# Patient Record
Sex: MALE | URBAN - METROPOLITAN AREA
[De-identification: names, ages, dates, MRNs, and addresses within clinical notes are randomized per-mention and may not be internally consistent; named-entity substitution may affect disease eponyms.]

---

## 2018-12-01 ENCOUNTER — LAB REQUISITION (OUTPATIENT)
Dept: LAB | Facility: HOSPITAL | Age: 48
End: 2018-12-01

## 2018-12-01 DIAGNOSIS — E87.8 OTHER DISORDERS OF ELECTROLYTE AND FLUID BALANCE, NOT ELSEWHERE CLASSIFIED: ICD-10-CM

## 2018-12-01 LAB — POTASSIUM SERPL-SCNC: 3.4 MMOL/L (ref 3.5–5.3)

## 2018-12-01 PROCEDURE — 84132 ASSAY OF SERUM POTASSIUM: CPT | Performed by: PHYSICIAN ASSISTANT

## 2023-05-04 ENCOUNTER — HOSPITAL ENCOUNTER (OUTPATIENT)
Dept: ULTRASOUND IMAGING | Facility: HOSPITAL | Age: 53
End: 2023-05-04

## 2023-05-04 DIAGNOSIS — I50.33 ACUTE ON CHRONIC DIASTOLIC HEART FAILURE (HCC): ICD-10-CM

## 2023-05-04 DIAGNOSIS — D53.9 NUTRITIONAL ANEMIA: ICD-10-CM

## 2023-09-06 ENCOUNTER — LAB REQUISITION (OUTPATIENT)
Dept: LAB | Facility: HOSPITAL | Age: 53
End: 2023-09-06
Payer: COMMERCIAL

## 2023-09-06 DIAGNOSIS — Z86.010 PERSONAL HISTORY OF COLONIC POLYPS: ICD-10-CM

## 2023-09-06 DIAGNOSIS — D69.6 THROMBOCYTOPENIA, UNSPECIFIED (HCC): ICD-10-CM

## 2023-09-06 DIAGNOSIS — N18.4 CHRONIC KIDNEY DISEASE, STAGE 4 (SEVERE) (HCC): ICD-10-CM

## 2023-09-06 DIAGNOSIS — I50.33 ACUTE ON CHRONIC DIASTOLIC (CONGESTIVE) HEART FAILURE (HCC): ICD-10-CM

## 2023-09-06 DIAGNOSIS — D63.1 ANEMIA IN CHRONIC KIDNEY DISEASE (CODE): ICD-10-CM

## 2023-09-06 DIAGNOSIS — D80.2 SELECTIVE DEFICIENCY OF IMMUNOGLOBULIN A (IGA) (HCC): ICD-10-CM

## 2023-09-06 DIAGNOSIS — D75.89 OTHER SPECIFIED DISEASES OF BLOOD AND BLOOD-FORMING ORGANS: ICD-10-CM

## 2023-09-06 DIAGNOSIS — D51.9 VITAMIN B12 DEFICIENCY ANEMIA, UNSPECIFIED: ICD-10-CM

## 2023-09-06 DIAGNOSIS — D53.9 NUTRITIONAL ANEMIA, UNSPECIFIED: ICD-10-CM

## 2023-09-06 DIAGNOSIS — K76.0 FATTY (CHANGE OF) LIVER, NOT ELSEWHERE CLASSIFIED: ICD-10-CM

## 2023-09-06 LAB
BASOPHILS # BLD AUTO: 0.04 THOUSANDS/ÂΜL (ref 0–0.1)
BASOPHILS NFR BLD AUTO: 1 % (ref 0–1)
EOSINOPHIL # BLD AUTO: 0.1 THOUSAND/ÂΜL (ref 0–0.61)
EOSINOPHIL NFR BLD AUTO: 2 % (ref 0–6)
ERYTHROCYTE [DISTWIDTH] IN BLOOD BY AUTOMATED COUNT: 15.9 % (ref 11.6–15.1)
HCT VFR BLD AUTO: 37.4 % (ref 36.5–49.3)
HGB BLD-MCNC: 12.5 G/DL (ref 12–17)
IMM GRANULOCYTES # BLD AUTO: 0.01 THOUSAND/UL (ref 0–0.2)
IMM GRANULOCYTES NFR BLD AUTO: 0 % (ref 0–2)
LYMPHOCYTES # BLD AUTO: 0.86 THOUSANDS/ÂΜL (ref 0.6–4.47)
LYMPHOCYTES NFR BLD AUTO: 15 % (ref 14–44)
MCH RBC QN AUTO: 31.4 PG (ref 26.8–34.3)
MCHC RBC AUTO-ENTMCNC: 33.4 G/DL (ref 31.4–37.4)
MCV RBC AUTO: 94 FL (ref 82–98)
MONOCYTES # BLD AUTO: 0.54 THOUSAND/ÂΜL (ref 0.17–1.22)
MONOCYTES NFR BLD AUTO: 9 % (ref 4–12)
NEUTROPHILS # BLD AUTO: 4.24 THOUSANDS/ÂΜL (ref 1.85–7.62)
NEUTS SEG NFR BLD AUTO: 73 % (ref 43–75)
NRBC BLD AUTO-RTO: 0 /100 WBCS
PLATELET # BLD AUTO: 151 THOUSANDS/UL (ref 149–390)
PMV BLD AUTO: 11.4 FL (ref 8.9–12.7)
RBC # BLD AUTO: 3.98 MILLION/UL (ref 3.88–5.62)
WBC # BLD AUTO: 5.79 THOUSAND/UL (ref 4.31–10.16)

## 2023-09-06 PROCEDURE — 85025 COMPLETE CBC W/AUTO DIFF WBC: CPT | Performed by: INTERNAL MEDICINE

## 2024-04-10 ENCOUNTER — RA CDI HCC (OUTPATIENT)
Dept: OTHER | Facility: HOSPITAL | Age: 54
End: 2024-04-10

## 2024-04-10 NOTE — PROGRESS NOTES
HCC coding opportunities       Chart reviewed, no opportunity found: CHART REVIEWED, NO OPPORTUNITY FOUND   This is a reminder to address (resolve/update/assess) ALL HCC (risk adjustment) codes as found on active problem list for 2024 as patient scores reset to zero CLARI.  Also, just a reminder to please review and assess all other chronic conditions for 2024     Patients Insurance        Commercial Insurance: [employer]

## 2024-04-17 ENCOUNTER — OFFICE VISIT (OUTPATIENT)
Dept: FAMILY MEDICINE CLINIC | Facility: HOSPITAL | Age: 54
End: 2024-04-17
Payer: COMMERCIAL

## 2024-04-17 VITALS
BODY MASS INDEX: 24.59 KG/M2 | HEART RATE: 76 BPM | HEIGHT: 69 IN | WEIGHT: 166 LBS | OXYGEN SATURATION: 99 % | DIASTOLIC BLOOD PRESSURE: 90 MMHG | SYSTOLIC BLOOD PRESSURE: 150 MMHG

## 2024-04-17 DIAGNOSIS — K80.80 BILIARY CALCULUS OF OTHER SITE WITHOUT OBSTRUCTION: Primary | ICD-10-CM

## 2024-04-17 DIAGNOSIS — I50.33 ACUTE ON CHRONIC DIASTOLIC HEART FAILURE (HCC): ICD-10-CM

## 2024-04-17 DIAGNOSIS — K85.00 IDIOPATHIC ACUTE PANCREATITIS, UNSPECIFIED COMPLICATION STATUS: ICD-10-CM

## 2024-04-17 DIAGNOSIS — Z87.898 HISTORY OF SEIZURE: ICD-10-CM

## 2024-04-17 DIAGNOSIS — Z12.5 SCREENING FOR PROSTATE CANCER: ICD-10-CM

## 2024-04-17 DIAGNOSIS — N18.9 HISTORY OF ANEMIA DUE TO CHRONIC KIDNEY DISEASE: ICD-10-CM

## 2024-04-17 DIAGNOSIS — Z86.73 HISTORY OF CVA (CEREBROVASCULAR ACCIDENT): ICD-10-CM

## 2024-04-17 DIAGNOSIS — F41.8 SITUATIONAL ANXIETY: ICD-10-CM

## 2024-04-17 DIAGNOSIS — I10 PRIMARY HYPERTENSION: ICD-10-CM

## 2024-04-17 DIAGNOSIS — E78.2 MIXED HYPERLIPIDEMIA: ICD-10-CM

## 2024-04-17 DIAGNOSIS — Z86.2 HISTORY OF ANEMIA DUE TO CHRONIC KIDNEY DISEASE: ICD-10-CM

## 2024-04-17 DIAGNOSIS — N18.4 CKD (CHRONIC KIDNEY DISEASE) STAGE 4, GFR 15-29 ML/MIN (HCC): Chronic | ICD-10-CM

## 2024-04-17 PROBLEM — Z78.9 ALCOHOL USE: Status: ACTIVE | Noted: 2023-03-21

## 2024-04-17 PROBLEM — E87.29 HIGH ANION GAP METABOLIC ACIDOSIS: Status: ACTIVE | Noted: 2018-11-11

## 2024-04-17 PROBLEM — G93.40 ACUTE ENCEPHALOPATHY: Status: ACTIVE | Noted: 2018-11-14

## 2024-04-17 PROBLEM — D69.6 THROMBOCYTOPENIA (HCC): Status: ACTIVE | Noted: 2023-03-21

## 2024-04-17 PROBLEM — E87.29 HIGH ANION GAP METABOLIC ACIDOSIS: Status: RESOLVED | Noted: 2018-11-11 | Resolved: 2024-04-17

## 2024-04-17 PROBLEM — K85.90 PANCREATITIS: Status: ACTIVE | Noted: 2023-05-10

## 2024-04-17 PROBLEM — G93.40 ACUTE ENCEPHALOPATHY: Status: RESOLVED | Noted: 2018-11-14 | Resolved: 2024-04-17

## 2024-04-17 PROBLEM — K80.20 CHOLELITHIASIS: Status: ACTIVE | Noted: 2023-05-10

## 2024-04-17 PROBLEM — F10.90 ALCOHOL USE: Status: ACTIVE | Noted: 2023-03-21

## 2024-04-17 PROCEDURE — 99205 OFFICE O/P NEW HI 60 MIN: CPT | Performed by: STUDENT IN AN ORGANIZED HEALTH CARE EDUCATION/TRAINING PROGRAM

## 2024-04-17 RX ORDER — ESCITALOPRAM OXALATE 5 MG/1
5 TABLET ORAL DAILY
COMMUNITY
End: 2024-04-17 | Stop reason: SDUPTHER

## 2024-04-17 RX ORDER — ROSUVASTATIN CALCIUM 10 MG/1
10 TABLET, COATED ORAL DAILY
COMMUNITY
End: 2024-04-17 | Stop reason: SDUPTHER

## 2024-04-17 RX ORDER — ESCITALOPRAM OXALATE 5 MG/1
5 TABLET ORAL DAILY
Qty: 90 TABLET | Refills: 3 | Status: SHIPPED | OUTPATIENT
Start: 2024-05-03

## 2024-04-17 RX ORDER — ROSUVASTATIN CALCIUM 10 MG/1
10 TABLET, COATED ORAL DAILY
Qty: 90 TABLET | Refills: 3 | Status: SHIPPED | OUTPATIENT
Start: 2024-05-03

## 2024-04-17 RX ORDER — CHOLECALCIFEROL (VITAMIN D3) 1250 MCG
50000 CAPSULE ORAL WEEKLY
COMMUNITY

## 2024-04-17 RX ORDER — AMLODIPINE BESYLATE 5 MG/1
5 TABLET ORAL DAILY
COMMUNITY
End: 2024-04-17 | Stop reason: SDUPTHER

## 2024-04-17 RX ORDER — AMLODIPINE BESYLATE 5 MG/1
5 TABLET ORAL DAILY
Qty: 90 TABLET | Refills: 3 | Status: SHIPPED | OUTPATIENT
Start: 2024-05-03

## 2024-04-17 NOTE — PROGRESS NOTES
[facility]   Sandra Mustafa DO    Assessment/Plan:      Diagnosis ICD-10-CM Associated Orders   1. Biliary calculus of other site without obstruction  K80.80 Ambulatory Referral to Gastroenterology      2. CKD (chronic kidney disease) stage 4, GFR 15-29 ml/min (HCC)  N18.4 Comprehensive metabolic panel     Comprehensive metabolic panel      3. History of CVA (cerebrovascular accident)  Z86.73 Lipid Panel with Direct LDL reflex     Lipid Panel with Direct LDL reflex     rosuvastatin (CRESTOR) 10 MG tablet      4. Mixed hyperlipidemia  E78.2 Lipid Panel with Direct LDL reflex     Lipid Panel with Direct LDL reflex     rosuvastatin (CRESTOR) 10 MG tablet      5. Screening for prostate cancer  Z12.5 PSA, Total Screen     PSA, Total Screen      6. Idiopathic acute pancreatitis, unspecified complication status  K85.00 Ambulatory Referral to Gastroenterology      7. Acute on chronic diastolic heart failure (HCC)  I50.33       8. History of anemia due to chronic kidney disease  N18.9     Z86.2       9. History of seizure  Z87.898       10. Primary hypertension  I10 amLODIPine (NORVASC) 5 mg tablet      11. Situational anxiety  F41.8 escitalopram (LEXAPRO) 5 mg tablet        Established today in office.   GI ref requested & given.   Patient's medications were reviewed and reconciled.  Ordered/Re-ordered as above.  Labs as above.   Return in about 2 months (around 6/17/2024) for Annual Physical.  Patient may call or return to office with any questions or concerns.   ______________________________________________________________________  Subjective:     Patient ID: Sudhir Bach is a 54 y.o. male.  HPI  Sudhir Bach  Chief Complaint   Patient presents with   • Establish Care     [place].    - 3 meds from him.     Sees [facility] - on a kidney transplant list.   Numbers going in right direction.   Down 40 lbs, limited red meat, very good diet, good water  intake.   No Nsaid's. No ASA.   Will be going to do labs at [facility] soon.     Sees Hematology at [facility] - on Vit D  Does labs every 2 weeks, watching Hb & in the 12's.     Hx of CVA 2014, 2/2 being taken off BP meds ( carvedilol? We think)   [facility] - [place] - there for one yr.     Had grown up in [facility] for [place].     Had colonoscopy 2-3 yrs ago in [facility], polyps removed 5 yr recall.      The following portions of the patient's history were reviewed and updated as appropriate: allergies, current medications, past medical history, and problem list.    Review of Systems   Constitutional:  Negative for chills and fever.   Respiratory:  Negative for cough and shortness of breath.    Cardiovascular:  Negative for chest pain and palpitations.   Neurological:  Negative for dizziness, light-headedness and headaches.       Objective:      Vitals:    04/17/24 0823   BP: 150/90   Pulse: 76   SpO2: 99%      Physical Exam  Vitals and nursing note reviewed.   Constitutional:       General: He is not in acute distress.     Appearance: Normal appearance. He is normal weight. He is not ill-appearing.   HENT:      Head: Normocephalic and atraumatic.   Eyes:      General: No scleral icterus.        Right eye: No discharge.         Left eye: No discharge.   Cardiovascular:      Rate and Rhythm: Normal rate and regular rhythm.      Pulses: Normal pulses.      Heart sounds: Normal heart sounds. No murmur heard.  Pulmonary:      Effort: Pulmonary effort is normal. No respiratory distress.      Breath sounds: Normal breath sounds. No stridor. No wheezing.   Musculoskeletal:      Cervical back: Normal range of motion and neck supple. No rigidity.      Right lower leg: No edema.      Left lower leg: No edema.   Neurological:      Mental Status: He is alert and oriented to person, place, and time.      Gait: Gait normal.   Psychiatric:         Mood and Affect: Mood normal.         Behavior: Behavior normal.          "Thought Content: Thought content normal.         Judgment: Judgment normal.           Portions of the record may have been created with voice recognition software. Occasional wrong word or \"sound alike\" substitutions may have occurred due to the inherent limitations of voice recognition software. Please review the chart carefully and recognize, using context, where substitutions/typographical errors may have occurred.     "

## 2024-06-18 ENCOUNTER — RA CDI HCC (OUTPATIENT)
Dept: OTHER | Facility: HOSPITAL | Age: 54
End: 2024-06-18

## 2024-06-18 PROBLEM — I50.33 ACUTE ON CHRONIC DIASTOLIC HEART FAILURE (HCC): Status: ACTIVE | Noted: 2024-06-18

## 2024-06-18 NOTE — PROGRESS NOTES
HCC coding opportunities       Chart reviewed, no opportunity found: CHART REVIEWED, NO OPPORTUNITY FOUND        Patients Insurance        Commercial Insurance: [employer] Commercial Insurance        normal performance

## 2024-08-08 LAB
ALBUMIN SERPL-MCNC: 4.2 G/DL (ref 3.6–5.1)
ALBUMIN/GLOB SERPL: 1.4 (CALC) (ref 1–2.5)
ALP SERPL-CCNC: 107 U/L (ref 35–144)
ALT SERPL-CCNC: 64 U/L (ref 9–46)
AST SERPL-CCNC: 112 U/L (ref 10–35)
BILIRUB SERPL-MCNC: 0.9 MG/DL (ref 0.2–1.2)
BUN SERPL-MCNC: 20 MG/DL (ref 7–25)
BUN/CREAT SERPL: 11 (CALC) (ref 6–22)
CALCIUM SERPL-MCNC: 10.6 MG/DL (ref 8.6–10.3)
CHLORIDE SERPL-SCNC: 102 MMOL/L (ref 98–110)
CHOLEST SERPL-MCNC: 99 MG/DL
CHOLEST/HDLC SERPL: 1.6 (CALC)
CO2 SERPL-SCNC: 27 MMOL/L (ref 20–32)
CREAT SERPL-MCNC: 1.81 MG/DL (ref 0.7–1.3)
GFR/BSA.PRED SERPLBLD CYS-BASED-ARV: 44 ML/MIN/1.73M2
GLOBULIN SER CALC-MCNC: 3.1 G/DL (CALC) (ref 1.9–3.7)
GLUCOSE SERPL-MCNC: 125 MG/DL (ref 65–99)
HDLC SERPL-MCNC: 61 MG/DL
LDLC SERPL CALC-MCNC: 24 MG/DL (CALC)
NONHDLC SERPL-MCNC: 38 MG/DL (CALC)
POTASSIUM SERPL-SCNC: 3.9 MMOL/L (ref 3.5–5.3)
PROT SERPL-MCNC: 7.3 G/DL (ref 6.1–8.1)
PSA SERPL-MCNC: 1.95 NG/ML
SODIUM SERPL-SCNC: 139 MMOL/L (ref 135–146)
TRIGL SERPL-MCNC: 49 MG/DL

## 2024-08-29 RX ORDER — CHOLECALCIFEROL (VITAMIN D3) 1250 MCG
50000 CAPSULE ORAL WEEKLY
Refills: 0 | OUTPATIENT
Start: 2024-08-29

## 2024-08-29 NOTE — TELEPHONE ENCOUNTER
Left message for patient letting him know Dr Mustafa will discuss the Vit D refill at his next office visit.

## 2024-09-16 ENCOUNTER — OFFICE VISIT (OUTPATIENT)
Dept: FAMILY MEDICINE CLINIC | Facility: HOSPITAL | Age: 54
End: 2024-09-16
Payer: COMMERCIAL

## 2024-09-16 VITALS
HEIGHT: 69 IN | DIASTOLIC BLOOD PRESSURE: 80 MMHG | OXYGEN SATURATION: 99 % | HEART RATE: 69 BPM | WEIGHT: 169 LBS | SYSTOLIC BLOOD PRESSURE: 122 MMHG | BODY MASS INDEX: 25.03 KG/M2

## 2024-09-16 DIAGNOSIS — N18.4 CKD (CHRONIC KIDNEY DISEASE) STAGE 4, GFR 15-29 ML/MIN (HCC): ICD-10-CM

## 2024-09-16 DIAGNOSIS — E55.9 VITAMIN D DEFICIENCY: ICD-10-CM

## 2024-09-16 DIAGNOSIS — E53.8 B12 DEFICIENCY: ICD-10-CM

## 2024-09-16 DIAGNOSIS — Z12.11 SCREENING FOR COLON CANCER: Primary | ICD-10-CM

## 2024-09-16 DIAGNOSIS — Z86.73 HISTORY OF CVA (CEREBROVASCULAR ACCIDENT): ICD-10-CM

## 2024-09-16 DIAGNOSIS — R73.01 IFG (IMPAIRED FASTING GLUCOSE): ICD-10-CM

## 2024-09-16 DIAGNOSIS — Z00.00 ROUTINE ADULT HEALTH MAINTENANCE: ICD-10-CM

## 2024-09-16 DIAGNOSIS — E78.2 MIXED HYPERLIPIDEMIA: ICD-10-CM

## 2024-09-16 PROBLEM — K85.10 BILIARY ACUTE PANCREATITIS WITHOUT NECROSIS OR INFECTION: Status: ACTIVE | Noted: 2024-09-16

## 2024-09-16 PROBLEM — Z78.9 ALCOHOL USE: Status: RESOLVED | Noted: 2023-03-21 | Resolved: 2024-09-16

## 2024-09-16 PROBLEM — N26.9 RENAL SCLEROSIS, UNSPECIFIED: Status: ACTIVE | Noted: 2024-09-16

## 2024-09-16 PROBLEM — F10.90 ALCOHOL USE: Status: RESOLVED | Noted: 2023-03-21 | Resolved: 2024-09-16

## 2024-09-16 LAB — SL AMB POCT HEMOGLOBIN AIC: 5.2 (ref ?–6.5)

## 2024-09-16 PROCEDURE — 99396 PREV VISIT EST AGE 40-64: CPT | Performed by: STUDENT IN AN ORGANIZED HEALTH CARE EDUCATION/TRAINING PROGRAM

## 2024-09-16 PROCEDURE — 99214 OFFICE O/P EST MOD 30 MIN: CPT | Performed by: STUDENT IN AN ORGANIZED HEALTH CARE EDUCATION/TRAINING PROGRAM

## 2024-09-16 PROCEDURE — 83036 HEMOGLOBIN GLYCOSYLATED A1C: CPT | Performed by: STUDENT IN AN ORGANIZED HEALTH CARE EDUCATION/TRAINING PROGRAM

## 2024-09-16 NOTE — ASSESSMENT & PLAN NOTE
2023 - level of 130 , will recheck with next set of labs.   Orders:    Vitamin B12; Future    Vitamin B12

## 2024-09-16 NOTE — PATIENT INSTRUCTIONS
Ask Dr. Yeung if you are okay to go to 5 mg per day crestor due to CVA hx & very low lipid levels.    Humira Counseling:  I discussed with the patient the risks of adalimumab including but not limited to myelosuppression, immunosuppression, autoimmune hepatitis, demyelinating diseases, lymphoma, and serious infections.  The patient understands that monitoring is required including a PPD at baseline and must alert us or the primary physician if symptoms of infection or other concerning signs are noted.

## 2024-09-16 NOTE — ASSESSMENT & PLAN NOTE
Lab Results   Component Value Date    EGFR 44 (L) 08/08/2024    EGFR 40 (L) 06/15/2023    EGFR 26 (L) 05/16/2023    CREATININE 1.81 (H) 08/08/2024    CREATININE 1.96 (H) 06/15/2023    CREATININE 2.85 (H) 05/16/2023      F/W [facility], just seen.   Ask Dr. Yeung if you are okay to go to 5 mg per day crestor due to CVA hx & very low lipid levels.

## 2024-09-16 NOTE — PROGRESS NOTES
A postop septal surgery who had some bleeding that was treated by my partner Dr. Barrientos.  She was awaiting re-evaluation to see if her bleeding was controlled and her lab results.    Results for orders placed or performed during the hospital encounter of 01/11/24   Comprehensive Metabolic Panel   Result Value    Fasting Status     Sodium 139    Potassium 3.7    Chloride 106    Carbon Dioxide 22    Anion Gap 15    Glucose 115 (H)    BUN 17    Creatinine 0.59    Glomerular Filtration Rate >90     Comment: eGFR results = or >60 mL/min/1.73m2 = Normal kidney function. Estimated GFR calculated using the CKD-EPI-R (2021) equation that does not include race in the creatinine calculation.    BUN/Cr 29 (H)    Calcium 8.7    Bilirubin, Total 0.2    GOT/AST 14    GPT/ALT 41    Alkaline Phosphatase 79    Albumin 3.9    Protein, Total 7.4    Globulin 3.5    A/G Ratio 1.1   Prothrombin Time (INR/PT)   Result Value    Protime- PT 10.3    INR 1.0     Comment: INR Therapeutic Range: 2.0 to 3.0 (2.5 to 3.5 recommended for recurrent thrombotic episodes and mechanical prosthetic heart valves.)   CBC with Automated Differential (performable only)   Result Value    WBC 19.0 (H)    RBC 4.19    HGB 12.4    HCT 36.8    MCV 87.8    MCH 29.6    MCHC 33.7    RDW-CV 13.7    RDW-SD 44.3        NRBC 0    Neutrophil, Percent 84    Lymphocytes, Percent 11    Mono, Percent 4    Eosinophils, Percent 0    Basophils, Percent 0    Immature Granulocytes 1    Absolute Neutrophils 15.9 (H)    Absolute Lymphocytes 2.1    Absolute Monocytes 0.8    Absolute Eosinophils  0.0    Absolute Basophils 0.1    Absolute Immature Granulocytes 0.1         Epistaxis Management    Date/Time: 1/12/2024 2:06 AM    Performed by: Ewelina Song MD  Authorized by: Ewelina Song MD    Consent:     Consent obtained:  Verbal    Consent given by:  Patient  Anesthesia:     Anesthesia method:  Topical application    Topical anesthetic:  Lidocaine gel  Procedure  Adult Annual Physical  Name: Sudhir Bach      : 1970      MRN: [id number]  Encounter Provider: Sandra Mustafa DO  Encounter Date: 2024   Encounter department: [facility] [address]    Assessment & Plan  Screening for colon cancer  GI appt upcoming. Ref given also by nephro due to rise in liver enzymes.   2x a month wine or beer       IFG (impaired fasting glucose)  , hx of prediabetes, last summer 5.8.   A1c 5.2 today - no concerns or meds needed.   Orders:    POCT hemoglobin A1c    Routine adult health maintenance  Counseling:  Alcohol/drug use: discussed moderation in alcohol intake, the recommendations for healthy alcohol use, and avoidance of illicit drug use.  Dental Health: discussed importance of regular tooth brushing, flossing, and dental visits.  Injury prevention: discussed safety/seat belts, safety helmets, smoke detectors, carbon dioxide detectors, and smoking near bedding or upholstery.  Sexual health: discussed sexually transmitted diseases, partner selection, use of condoms, avoidance of unintended pregnancy, and contraceptive alternatives.  Exercise: the importance of regular exercise/physical activity was discussed. Recommend exercise 3-5 times per week for at least 30 minutes.        CKD (chronic kidney disease) stage 4, GFR 15-29 ml/min (HCC)  Lab Results   Component Value Date    EGFR 44 (L) 2024    EGFR 40 (L) 06/15/2023    EGFR 26 (L) 2023    CREATININE 1.81 (H) 2024    CREATININE 1.96 (H) 06/15/2023    CREATININE 2.85 (H) 2023      F/W [facility], just seen.   Ask Dr. Yeung if you are okay to go to 5 mg per day crestor due to CVA hx & very low lipid levels.   History of CVA (cerebrovascular accident)  No recent ASA use.   Hx of CVA ,  being taken off BP meds ( carvedilol? We think)   [facility] - [place] - lived there for one yr.        Mixed hyperlipidemia  Would like to stop his crestor, but hx of  details:     Treatment site:  R anterior    Treatment method:  Nasal balloon  Post-procedure details:     Assessment:  Bleeding stopped    Procedure completion:  Tolerated well, no immediate complications      She has no concerning lab abnormalities for bleeding issues.  Her white count is elevated but she just had surgery today.  Her bleeding was not controlled with the initial measures by Dr. Barrientos.    Plan B was to place a nasal balloon.  I applied topical lidocaine and then placed a 5.5 centimeter balloon which the patient tolerated very well.  Bleeding was controlled at this point and she is stable for discharge to home.  She will contact her surgeon tomorrow to discuss when she should be seen in follow-up.    Diagnosis:   1. Epistaxis        Prescription(s):     Summary of your Discharge Medications      You have not been prescribed any medications.            Follow-Up:   Chicho Nguyen MD  [facility] [address] [facility] [address] [phone]      As needed    Wilmer Tang MD  [address] [place] [address]  [phone]    Call in 1 day               Ewelina Song MD  01/12/24 1164     stroke in [place].   Lipid panel very very low.        Vitamin D deficiency    Orders:    Vitamin D 25 hydroxy; Future    Vitamin D 25 hydroxy    B12 deficiency  2023 - level of 130 , will recheck with next set of labs.   Orders:    Vitamin B12; Future    Vitamin B12    Immunizations and preventive care screenings were discussed with patient today. Appropriate education was printed on patient's after visit summary.    Discussed risks and benefits of prostate cancer screening. We discussed the controversial history of PSA screening for prostate cancer in the [place] as well as the risk of over detection and over treatment of prostate cancer by way of PSA screening.  The patient understands that PSA blood testing is an imperfect way to screen for prostate cancer and that elevated PSA levels in the blood may also be caused by infection, inflammation, prostatic trauma or manipulation, urological procedures, or by benign prostatic enlargement.    The role of the digital rectal examination in prostate cancer screening was also discussed and I discussed with him that there is large interobserver variability in the findings of digital rectal examination.           History of Present Illness     Adult Annual Physical:  Patient presents for annual physical. Feeling great, not ill recently.   GI appt next week. Due for scope.     Diet and Physical Activity:  - Diet/Nutrition:. Tries to avoid red meat for his kidneys, great on water intake, dairy, ptn, veggies, etc.  - Exercise: walking.    General Health:  - Sleep: sleeps poorly and 7-8 hours of sleep on average. does wake to urinate  - Hearing: normal hearing bilateral ears.  - Vision: wears glasses and goes for regular eye exams.  - Dental: regular dental visits and brushes teeth twice daily.     Health:  - History of STDs: no.   - Urinary symptoms: nocturia and erectile dysfunction.     CMP reviewed some changes noted.   PSA stable.   Tchol 99, HDL 61, TG 49, LDL  "24   On crestor 10 mg now for yrs. Used to be much heavier.     Wt Readings from Last 3 Encounters:   09/16/24 76.7 kg (169 lb)   04/17/24 75.3 kg (166 lb)     Temp Readings from Last 3 Encounters:   No data found for Temp     BP Readings from Last 3 Encounters:   09/16/24 122/80   04/17/24 150/90     Pulse Readings from Last 3 Encounters:   09/16/24 69   04/17/24 76     Review of Systems   Constitutional:  Negative for chills and fever.   Respiratory:  Negative for cough and shortness of breath.    Cardiovascular:  Negative for chest pain, palpitations and leg swelling.   Gastrointestinal:  Negative for constipation and diarrhea.   Genitourinary:  Negative for dysuria, flank pain, frequency and hematuria.   Neurological:  Negative for dizziness, light-headedness and headaches.   Psychiatric/Behavioral:  Negative for dysphoric mood. The patient is not nervous/anxious.      Current Outpatient Medications on File Prior to Visit   Medication Sig Dispense Refill    Cholecalciferol (Vitamin D3) 1.25 MG (70670 UT) capsule Take 50,000 Units by mouth once a week      escitalopram (LEXAPRO) 5 mg tablet Take 1 tablet (5 mg total) by mouth daily Do not start before May 3, 2024. 90 tablet 3    rosuvastatin (CRESTOR) 10 MG tablet Take 1 tablet (10 mg total) by mouth daily Do not start before May 3, 2024. 90 tablet 3    [DISCONTINUED] amLODIPine (NORVASC) 5 mg tablet Take 1 tablet (5 mg total) by mouth daily Do not start before May 3, 2024. 90 tablet 3     No current facility-administered medications on file prior to visit.      Social History     Tobacco Use    Smoking status: Never    Smokeless tobacco: Never   Vaping Use    Vaping status: Never Used   Substance and Sexual Activity    Alcohol use: Not Currently    Drug use: Never    Sexual activity: Not Currently     Birth control/protection: None     Objective     /80   Pulse 69   Ht 5' 9\" (1.753 m)   Wt 76.7 kg (169 lb)   SpO2 99%   BMI 24.96 kg/m²     Physical " Exam  Vitals reviewed.   Constitutional:       General: He is not in acute distress.     Appearance: Normal appearance. He is normal weight. He is not ill-appearing.   HENT:      Head: Normocephalic and atraumatic.      Right Ear: Tympanic membrane, ear canal and external ear normal. There is no impacted cerumen.      Left Ear: Tympanic membrane, ear canal and external ear normal. There is no impacted cerumen.      Nose: Nose normal. No congestion or rhinorrhea.      Mouth/Throat:      Mouth: Mucous membranes are moist.      Pharynx: Oropharynx is clear. No oropharyngeal exudate or posterior oropharyngeal erythema.   Eyes:      General: No scleral icterus.        Right eye: No discharge.         Left eye: No discharge.      Conjunctiva/sclera: Conjunctivae normal.   Neck:      Comments: No thyroid nodules or thyromegaly.  Cardiovascular:      Rate and Rhythm: Normal rate and regular rhythm.      Pulses: Normal pulses.      Heart sounds: Normal heart sounds. No murmur heard.     No friction rub. No gallop.   Pulmonary:      Effort: Pulmonary effort is normal. No respiratory distress.      Breath sounds: Normal breath sounds. No stridor. No wheezing.   Abdominal:      Tenderness: There is no right CVA tenderness or left CVA tenderness.   Musculoskeletal:         General: Normal range of motion.      Cervical back: Normal range of motion and neck supple. No rigidity.      Right lower leg: No edema.      Left lower leg: No edema.   Skin:     General: Skin is warm and dry.      Capillary Refill: Capillary refill takes less than 2 seconds.      Coloration: Skin is not jaundiced or pale.   Neurological:      Mental Status: He is alert and oriented to person, place, and time.      Gait: Gait normal.   Psychiatric:         Mood and Affect: Mood normal.         Behavior: Behavior normal.         Thought Content: Thought content normal.         Judgment: Judgment normal.

## 2024-09-16 NOTE — ASSESSMENT & PLAN NOTE
No recent ASA use.   Hx of CVA 2014, 2/2 being taken off BP meds ( carvedilol? We think)   [facility] - [place] - lived there for one yr.

## 2024-09-23 DIAGNOSIS — E55.9 VITAMIN D DEFICIENCY: Primary | ICD-10-CM

## 2024-09-24 ENCOUNTER — OFFICE VISIT (OUTPATIENT)
Dept: GASTROENTEROLOGY | Facility: CLINIC | Age: 54
End: 2024-09-24
Payer: COMMERCIAL

## 2024-09-24 VITALS
BODY MASS INDEX: 25.83 KG/M2 | DIASTOLIC BLOOD PRESSURE: 78 MMHG | HEIGHT: 69 IN | WEIGHT: 174.4 LBS | SYSTOLIC BLOOD PRESSURE: 120 MMHG | TEMPERATURE: 98.7 F

## 2024-09-24 DIAGNOSIS — R11.2 NAUSEA AND VOMITING, UNSPECIFIED VOMITING TYPE: ICD-10-CM

## 2024-09-24 DIAGNOSIS — R19.4 CHANGE IN BOWEL HABITS: ICD-10-CM

## 2024-09-24 DIAGNOSIS — K80.80 BILIARY CALCULUS OF OTHER SITE WITHOUT OBSTRUCTION: ICD-10-CM

## 2024-09-24 DIAGNOSIS — R79.89 ELEVATED LFTS: Primary | ICD-10-CM

## 2024-09-24 DIAGNOSIS — K21.9 GASTROESOPHAGEAL REFLUX DISEASE, UNSPECIFIED WHETHER ESOPHAGITIS PRESENT: ICD-10-CM

## 2024-09-24 PROCEDURE — 99214 OFFICE O/P EST MOD 30 MIN: CPT | Performed by: PHYSICIAN ASSISTANT

## 2024-09-24 RX ORDER — PANTOPRAZOLE SODIUM 40 MG/1
40 TABLET, DELAYED RELEASE ORAL DAILY
Qty: 30 TABLET | Refills: 3 | Status: SHIPPED | OUTPATIENT
Start: 2024-09-24

## 2024-09-24 NOTE — PATIENT INSTRUCTIONS
Make sure you are staying hydrated   Start over-the-counter fiber (benefiber or citrucel) every morning     Scheduled date of EGD/colonoscopy (as of today):12.12.24  Physician performing EGD/colonoscopy:DR MILAN  Location of EGD/colonoscopy:[facility]  Desired bowel prep reviewed with patient:JAYLEN  Instructions reviewed with patient by:YAZAN  Clearances:  N/A  Pt will schedule U/S

## 2024-09-24 NOTE — PROGRESS NOTES
"[facility] - Outpatient Consultation  Sudhir Bach 54 y.o. male MRN: [id number]  Encounter: [id number]          ASSESSMENT AND PLAN:      Sudhir is a 53 y/o male with history of idiopathic pancreatitis, HTN, CKD with renal sclerosis, HLD who presents for consultation of transaminitis.     GERD  N/V  Change in bowel habits  Pt says he was actually \"not referred\" for elevated LFTs as he is not concerned with this (See below). He notes he was referred for his n/v, heartburn and change in bowel habits. Pt notes the last year or so, he has bene experiencing heartburn almost daily and is getting NBNB n/v every few days. He says the n/v can occur at \"any time\" of the day, not at night. He says he has also noticed that he is having \"many\" Bms/day but they are not loose. He says he used to have one BM/day but now, he is having anywhere from 3-6, even at night. He says they are usually formed, but are smaller in size, and he \"never\" feel relief after moving his bowels anymore. He says he underwent colonoscopy 3-4 yrs ago and was found to have \"several\" polyps, but I do not see this. He says OTC omeprazole is not helping.   -TSH ordered   -stop OTC omeprazole  -start protonix 40 mg daily 30 minutes before breakfast  -anti-gerd diet discussed and handout given  -ensure you are drinking at least 64 ounces water/day   -start OTC benefiber or citrucel daily   -EGD and colonoscopy ordered to rule out H.pylori, PUD, hiatal hernia, celiac, obstructing lesions     4. Transaminitis   5. Hx of dilated biliary tree   , ALT 62 with T.Bili and AP WNL. Pt does have hx of idiopathic pancreatitis last year and underwent MRCP at [facility] that noted \"Although no choledocholithiasis seen, evaluation is suboptimal as study was   prematurely terminated prior to completion due to claustrophobia. Cholelithiasis   is seen. \" He says he was told his pancreatitis was due to gallstones but his gallbladder was not " "removed as his pain resolved quickly. Pt denies alcohol, tobacco, drug use. He says the only OTC supplement he takes is OTC omeprazole.   -repeat LFTs + RUQ u/s ordered  -acute hep, EBV and CMV ordered to rule out acute viral etiology  -if LFTs remain elevated and remainder of work-up is normal, we can proceed with full work-up       ______________________________________________________________________    HPI:  Sudhir is a 53 y/o male with history of idiopathic pancreatitis, HTN, CKD with renal sclerosis, HLD who presents for consultation of transaminitis. He notes he was referred for his n/v, heartburn and change in bowel habits. Pt notes the last year or so, he has bene experiencing heartburn almost daily and is getting NBNB n/v every few days. He says the n/v can occur at \"any time\" of the day, not at night. He says he has also noticed that he is having \"many\" Bms/day but they are not loose. He says he used to have one BM/day but now, he is having anywhere from 3-6, even at night. He says they are usually formed, but are smaller in size, and he \"never\" feel relief after moving his bowels anymore. He says he underwent colonoscopy 3-4 yrs ago and was found to have \"several\" polyps, but I do not see this. He says OTC omeprazole is not helping. He denies family hx of colon cancer, unintentional weight loss, fevers, chills, night sweats, NSAID use, tylenol use, diarrhea, bloody or black BM. Pt denies alcohol, tobacco, drug use. He says the only OTC supplement he takes is OTC omeprazole. Pt denies prior abdominal surgical hx. Pt denies family hx of liver disease. Pt is not on blood thinners.       REVIEW OF SYSTEMS:    CONSTITUTIONAL: Denies any fever, chills, rigors, and weight loss.  HEENT: No earache or tinnitus. Denies hearing loss or visual disturbances.  CARDIOVASCULAR: No chest pain or palpitations.   RESPIRATORY: Denies any cough, hemoptysis, shortness of breath or dyspnea on exertion.  GASTROINTESTINAL: As " noted in the History of Present Illness.   GENITOURINARY: No problems with urination. Denies any hematuria or dysuria.  NEUROLOGIC: No dizziness or vertigo, denies headaches.   MUSCULOSKELETAL: Denies any muscle or joint pain.   SKIN: Denies skin rashes or itching.   ENDOCRINE: Denies excessive thirst. Denies intolerance to heat or cold.  PSYCHOSOCIAL: Denies depression or anxiety. Denies any recent memory loss.       Historical Information   Past Medical History:   Diagnosis Date    Anxiety     Chronic kidney disease     Hypertension      Past Surgical History:   Procedure Laterality Date    US GUIDED KIDNEY BIOPSY  11/14/2018     Social History   Social History     Substance and Sexual Activity   Alcohol Use Not Currently     Social History     Substance and Sexual Activity   Drug Use Never     Social History     Tobacco Use   Smoking Status Never   Smokeless Tobacco Never     Family History   Problem Relation Age of Onset    Hypertension Father        Meds/Allergies       Current Outpatient Medications:     Cholecalciferol (Vitamin D3) 1.25 MG (14066 UT) capsule    escitalopram (LEXAPRO) 5 mg tablet    rosuvastatin (CRESTOR) 10 MG tablet    No Known Allergies        Objective     There were no vitals taken for this visit. There is no height or weight on file to calculate BMI.        PHYSICAL EXAM:      General Appearance:   Alert, cooperative, no distress   HEENT:   Normocephalic, atraumatic, anicteric.     Neck:  Supple, symmetrical, trachea midline   Lungs:   Clear to auscultation bilaterally; no rales, rhonchi or wheezing; respirations unlabored    Heart::   Regular rate and rhythm; no murmur, rub, or gallop.   Abdomen:   Soft, non-tender, non-distended; normal bowel sounds; no masses, no organomegaly    Genitalia:   Deferred    Rectal:   Deferred    Extremities:  No cyanosis, clubbing or edema    Pulses:  2+ and symmetric    Skin:  No jaundice, rashes, or lesions    Lymph nodes:  No palpable cervical  lymphadenopathy        Lab Results:   No visits with results within 1 Day(s) from this visit.   Latest known visit with results is:   Office Visit on 09/16/2024   Component Date Value    Hemoglobin A1C 09/16/2024 5.2          Radiology Results:   No results found.

## 2024-09-25 RX ORDER — CHOLECALCIFEROL (VITAMIN D3) 1250 MCG
50000 CAPSULE ORAL WEEKLY
Qty: 8 CAPSULE | Refills: 0 | Status: SHIPPED | OUTPATIENT
Start: 2024-09-25

## 2024-11-27 ENCOUNTER — TELEPHONE (OUTPATIENT)
Dept: GASTROENTEROLOGY | Facility: CLINIC | Age: 54
End: 2024-11-27

## 2024-12-09 DIAGNOSIS — E55.9 VITAMIN D DEFICIENCY: ICD-10-CM

## 2024-12-10 RX ORDER — CHOLECALCIFEROL (VITAMIN D3) 1250 MCG
1 CAPSULE ORAL WEEKLY
Qty: 8 CAPSULE | Refills: 0 | Status: SHIPPED | OUTPATIENT
Start: 2024-12-10

## 2024-12-12 ENCOUNTER — HOSPITAL ENCOUNTER (OUTPATIENT)
Dept: GASTROENTEROLOGY | Facility: HOSPITAL | Age: 54
Setting detail: OUTPATIENT SURGERY
End: 2024-12-12
Payer: COMMERCIAL

## 2024-12-12 ENCOUNTER — ANESTHESIA (OUTPATIENT)
Dept: GASTROENTEROLOGY | Facility: HOSPITAL | Age: 54
End: 2024-12-12
Payer: COMMERCIAL

## 2024-12-12 ENCOUNTER — ANESTHESIA EVENT (OUTPATIENT)
Dept: GASTROENTEROLOGY | Facility: HOSPITAL | Age: 54
End: 2024-12-12
Payer: COMMERCIAL

## 2024-12-12 VITALS
TEMPERATURE: 97.2 F | SYSTOLIC BLOOD PRESSURE: 100 MMHG | RESPIRATION RATE: 15 BRPM | HEIGHT: 69 IN | HEART RATE: 64 BPM | OXYGEN SATURATION: 100 % | DIASTOLIC BLOOD PRESSURE: 62 MMHG | WEIGHT: 161 LBS | BODY MASS INDEX: 23.85 KG/M2

## 2024-12-12 DIAGNOSIS — R11.2 NAUSEA AND VOMITING, UNSPECIFIED VOMITING TYPE: ICD-10-CM

## 2024-12-12 DIAGNOSIS — R19.4 CHANGE IN BOWEL HABITS: ICD-10-CM

## 2024-12-12 PROCEDURE — 88305 TISSUE EXAM BY PATHOLOGIST: CPT | Performed by: PATHOLOGY

## 2024-12-12 PROCEDURE — 43239 EGD BIOPSY SINGLE/MULTIPLE: CPT | Performed by: INTERNAL MEDICINE

## 2024-12-12 PROCEDURE — 45385 COLONOSCOPY W/LESION REMOVAL: CPT | Performed by: INTERNAL MEDICINE

## 2024-12-12 PROCEDURE — 43450 DILATE ESOPHAGUS 1/MULT PASS: CPT | Performed by: INTERNAL MEDICINE

## 2024-12-12 RX ORDER — PHENYLEPHRINE HCL IN 0.9% NACL 1 MG/10 ML
SYRINGE (ML) INTRAVENOUS AS NEEDED
Status: DISCONTINUED | OUTPATIENT
Start: 2024-12-12 | End: 2024-12-12

## 2024-12-12 RX ORDER — LIDOCAINE HYDROCHLORIDE 10 MG/ML
INJECTION, SOLUTION EPIDURAL; INFILTRATION; INTRACAUDAL; PERINEURAL AS NEEDED
Status: DISCONTINUED | OUTPATIENT
Start: 2024-12-12 | End: 2024-12-12

## 2024-12-12 RX ORDER — PROPOFOL 10 MG/ML
INJECTION, EMULSION INTRAVENOUS AS NEEDED
Status: DISCONTINUED | OUTPATIENT
Start: 2024-12-12 | End: 2024-12-12

## 2024-12-12 RX ORDER — SODIUM CHLORIDE 9 MG/ML
INJECTION, SOLUTION INTRAVENOUS CONTINUOUS PRN
Status: DISCONTINUED | OUTPATIENT
Start: 2024-12-12 | End: 2024-12-12

## 2024-12-12 RX ADMIN — PROPOFOL 30 MG: 10 INJECTION, EMULSION INTRAVENOUS at 13:01

## 2024-12-12 RX ADMIN — PROPOFOL 50 MG: 10 INJECTION, EMULSION INTRAVENOUS at 12:59

## 2024-12-12 RX ADMIN — Medication 100 MCG: at 12:57

## 2024-12-12 RX ADMIN — PROPOFOL 50 MG: 10 INJECTION, EMULSION INTRAVENOUS at 12:57

## 2024-12-12 RX ADMIN — PROPOFOL 20 MG: 10 INJECTION, EMULSION INTRAVENOUS at 13:06

## 2024-12-12 RX ADMIN — PROPOFOL 30 MG: 10 INJECTION, EMULSION INTRAVENOUS at 13:04

## 2024-12-12 RX ADMIN — SODIUM CHLORIDE: 0.9 INJECTION, SOLUTION INTRAVENOUS at 12:38

## 2024-12-12 RX ADMIN — PROPOFOL 20 MG: 10 INJECTION, EMULSION INTRAVENOUS at 13:13

## 2024-12-12 RX ADMIN — PROPOFOL 30 MG: 10 INJECTION, EMULSION INTRAVENOUS at 13:17

## 2024-12-12 RX ADMIN — LIDOCAINE HYDROCHLORIDE 100 MG: 10 INJECTION, SOLUTION EPIDURAL; INFILTRATION; INTRACAUDAL; PERINEURAL at 12:55

## 2024-12-12 RX ADMIN — PROPOFOL 20 MG: 10 INJECTION, EMULSION INTRAVENOUS at 13:26

## 2024-12-12 RX ADMIN — PROPOFOL 100 MG: 10 INJECTION, EMULSION INTRAVENOUS at 12:55

## 2024-12-12 RX ADMIN — PROPOFOL 20 MG: 10 INJECTION, EMULSION INTRAVENOUS at 13:20

## 2024-12-12 RX ADMIN — PROPOFOL 30 MG: 10 INJECTION, EMULSION INTRAVENOUS at 13:23

## 2024-12-12 RX ADMIN — PROPOFOL 30 MG: 10 INJECTION, EMULSION INTRAVENOUS at 13:09

## 2024-12-12 NOTE — H&P
"History and Physical -  [facility]  Sudhir Bach 54 y.o. male MRN: [id number]    HPI: Sudhir Bach is a 54 y.o. year old male who presents for EGD and colonoscopy secondary GERD and change in bowel habits    REVIEW OF SYSTEMS: Per the HPI, and otherwise unremarkable.    Historical Information   Past Medical History:   Diagnosis Date    Anxiety     Chronic kidney disease     Hypertension      Past Surgical History:   Procedure Laterality Date    US GUIDED KIDNEY BIOPSY  11/14/2018     Social History   Social History     Substance and Sexual Activity   Alcohol Use Yes     Social History     Substance and Sexual Activity   Drug Use Never     Social History     Tobacco Use   Smoking Status Never   Smokeless Tobacco Never     Family History   Problem Relation Age of Onset    Hypertension Father        Meds/Allergies       Current Outpatient Medications:     Cholecalciferol (Vitamin D3) 1.25 MG (19994 UT) CAPS    pantoprazole (PROTONIX) 40 mg tablet    rosuvastatin (CRESTOR) 10 MG tablet    escitalopram (LEXAPRO) 5 mg tablet    polyethylene glycol (GOLYTELY) 4000 mL solution    No Known Allergies    Objective     BP 99/55   Pulse 57   Temp (!) 97.2 °F (36.2 °C)   Resp 18   Ht 5' 9\" (1.753 m)   Wt 73 kg (161 lb)   SpO2 97%   BMI 23.78 kg/m²     PHYSICAL EXAM    Gen: NAD AAOx3  Head: Normocephalic, Atraumatic  CV: S1S2 RRR no m/r/g  CHEST: Clear b/l no c/r/w  ABD: soft, +BS NT/ND  EXT: no edema    ASSESSMENT/PLAN:  This is a 54 y.o. year old male here for colonoscopy secondary to GERD and change in bowel habits, and he is stable and optimized for his procedure.        "

## 2024-12-12 NOTE — ANESTHESIA PREPROCEDURE EVALUATION
Procedure:  COLONOSCOPY  EGD    Relevant Problems   CARDIO   (+) Mixed hyperlipidemia   (+) Primary hypertension   (+) Renal sclerosis, unspecified      GI/HEPATIC   (+) Biliary acute pancreatitis without necrosis or infection   (+) Pancreatitis      /RENAL   (+) CKD (chronic kidney disease) stage 4, GFR 15-29 ml/min (HCC)      HEMATOLOGY   (+) Thrombocytopenia (HCC)   3/23-EF-61%   Creat-1.8-8/24  elevated but down from 3.43 in 5/23  Physical Exam    Airway    Mallampati score: II  TM Distance: >3 FB  Neck ROM: full     Dental   No notable dental hx     Cardiovascular      Pulmonary      Other Findings        Anesthesia Plan  ASA Score- 3     Anesthesia Type- IV sedation with anesthesia with ASA Monitors.         Additional Monitors:     Airway Plan:            Plan Factors-Exercise tolerance (METS): >4 METS.    Chart reviewed. EKG reviewed. Imaging results reviewed. Existing labs reviewed. Patient summary reviewed.    Patient is not a current smoker.              Induction- intravenous.    Postoperative Plan-         Informed Consent- Anesthetic plan and risks discussed with patient.  I personally reviewed this patient with the CRNA. Discussed and agreed on the Anesthesia Plan with the CRNA..

## 2024-12-12 NOTE — ANESTHESIA POSTPROCEDURE EVALUATION
Post-Op Assessment Note    CV Status:  Stable  Pain Score: 0    Pain management: adequate       Mental Status:  Alert and awake   Hydration Status:  Euvolemic   PONV Controlled:  Controlled   Airway Patency:  Patent     Post Op Vitals Reviewed: Yes    No anethesia notable event occurred.    Staff: Anesthesiologist           Last Filed PACU Vitals:  Vitals Value Taken Time   Temp     Pulse 64 12/12/24 1352   /62 12/12/24 1352   Resp 15 12/12/24 1352   SpO2 100 % 12/12/24 1352       Modified Norman:  Activity: 2 (12/12/2024  1:47 PM)  Respiration: 2 (12/12/2024  1:47 PM)  Circulation: 2 (12/12/2024  1:47 PM)  Consciousness: 2 (12/12/2024  1:47 PM)  Oxygen Saturation: 2 (12/12/2024  1:47 PM)  Modified Norman Score: 10 (12/12/2024  1:47 PM)

## 2024-12-12 NOTE — ANESTHESIA POSTPROCEDURE EVALUATION
Post-Op Assessment Note    CV Status:  Stable  Pain Score: 0    Pain management: adequate       Mental Status:  Alert and awake   Hydration Status:  Stable   PONV Controlled:  None   Airway Patency:  Patent     Post Op Vitals Reviewed: Yes    No anethesia notable event occurred.    Staff: CRNA           Last Filed PACU Vitals:  Vitals Value Taken Time   Temp     Pulse 71 12/12/24 1335   BP 95/53 12/12/24 1335   Resp 14 12/12/24 1335   SpO2 98 % 12/12/24 1335       Modified Norman:  Activity: 2 (12/12/2024  1:35 PM)  Respiration: 2 (12/12/2024  1:35 PM)  Circulation: 2 (12/12/2024  1:35 PM)  Consciousness: 2 (12/12/2024  1:35 PM)  Oxygen Saturation: 2 (12/12/2024  1:35 PM)  Modified Norman Score: 10 (12/12/2024  1:35 PM)

## 2024-12-17 ENCOUNTER — RESULTS FOLLOW-UP (OUTPATIENT)
Dept: GASTROENTEROLOGY | Facility: CLINIC | Age: 54
End: 2024-12-17

## 2025-01-08 ENCOUNTER — OFFICE VISIT (OUTPATIENT)
Dept: GASTROENTEROLOGY | Facility: CLINIC | Age: 55
End: 2025-01-08
Payer: COMMERCIAL

## 2025-01-08 VITALS
DIASTOLIC BLOOD PRESSURE: 52 MMHG | WEIGHT: 172.4 LBS | BODY MASS INDEX: 25.46 KG/M2 | TEMPERATURE: 98.7 F | SYSTOLIC BLOOD PRESSURE: 104 MMHG

## 2025-01-08 DIAGNOSIS — R74.01 TRANSAMINITIS: ICD-10-CM

## 2025-01-08 DIAGNOSIS — K21.9 GASTROESOPHAGEAL REFLUX DISEASE, UNSPECIFIED WHETHER ESOPHAGITIS PRESENT: ICD-10-CM

## 2025-01-08 DIAGNOSIS — K22.2 SCHATZKI'S RING: Primary | ICD-10-CM

## 2025-01-08 DIAGNOSIS — Z86.0100 PERSONAL HISTORY OF COLON POLYPS, UNSPECIFIED: ICD-10-CM

## 2025-01-08 PROBLEM — K51.40 PSEUDOPOLYPOSIS OF COLON WITHOUT COMPLICATION, UNSPECIFIED PART OF COLON (HCC): Status: ACTIVE | Noted: 2025-01-08

## 2025-01-08 PROCEDURE — 99214 OFFICE O/P EST MOD 30 MIN: CPT | Performed by: PHYSICIAN ASSISTANT

## 2025-01-08 RX ORDER — PANTOPRAZOLE SODIUM 40 MG/1
40 TABLET, DELAYED RELEASE ORAL DAILY
Qty: 90 TABLET | Refills: 3 | Status: SHIPPED | OUTPATIENT
Start: 2025-01-08

## 2025-01-08 NOTE — PROGRESS NOTES
Name: Sudhir Bach      : 1970      MRN: [id number]  Encounter Provider: Sandra Ku PA-C  Encounter Date: 2025   Encounter department: [facility] [place]  :  Assessment & Plan  Schatzki's ring  EGD noted diffuse gastritis and a Schatzki's ring that was dilated; gastric biopsies negative for H. pylori and duodenal biopsies negative for celiac.  He says he has not had any issues since being on Protonix 40 mg daily.  -Continue Protonix 40 mg daily; if he remains stable at his next office visit we can try to decrease this to 20 mg       Personal history of colon polyps, unspecified  Colonoscopy noted 2 colon polyps this and internal hemorrhoids and mild sigmoid diverticulosis; 1 polyp in the ascending colon was found to be a pseudopolyp with surface ulceration with the polyp in the transverse colon noting small bowel mucosa but was otherwise normal.       Transaminitis  Ultrasound and blood work was ordered for his elevated LFTs at the time of his last office visit but he did not get any of this done.  He says that he was very busy which is why he could not get these tests done but will be getting them done within the next week or so.       Gastroesophageal reflux disease, unspecified whether esophagitis present  See above.  Orders:    pantoprazole (PROTONIX) 40 mg tablet; Take 1 tablet (40 mg total) by mouth daily 30 minutes before breakfast        History of Present Illness   HPI  Sudhir Bach is a 54 y.o. male who presents for follow-up.He says he has not had any issues since being on Protonix 40 mg daily.  He denies heartburn, abdominal pain, nausea vomiting, trouble swallowing or eating.  He says that since trying a probiotic he has been moving his bowels without issues for the most part and denies diarrhea or constipation.  Patient denies unintentional weight loss, fevers, chills, night sweats, bloody or black stools.  He says he is doing well at this  time.  History obtained from: patient    Review of Systems   Constitutional:  Negative for chills and fever.   HENT:  Negative for ear pain and sore throat.    Eyes:  Negative for pain and visual disturbance.   Respiratory:  Negative for cough and shortness of breath.    Cardiovascular:  Negative for chest pain and palpitations.   Gastrointestinal:  Negative for abdominal distention, abdominal pain, anal bleeding, blood in stool, constipation, diarrhea, nausea, rectal pain and vomiting.   Genitourinary:  Negative for dysuria and hematuria.   Musculoskeletal:  Negative for arthralgias and back pain.   Skin:  Negative for color change and rash.   Neurological:  Negative for seizures and syncope.   All other systems reviewed and are negative.    Medical History Reviewed by provider this encounter:     .  Past Medical History   Past Medical History:   Diagnosis Date    Anxiety     Chronic kidney disease     Hypertension      Past Surgical History:   Procedure Laterality Date    COLONOSCOPY      US GUIDED KIDNEY BIOPSY  11/14/2018     Family History   Problem Relation Age of Onset    Hypertension Father       reports that he has never smoked. He has never used smokeless tobacco. He reports current alcohol use. He reports that he does not use drugs.  Current Outpatient Medications on File Prior to Visit   Medication Sig Dispense Refill    Cholecalciferol (Vitamin D3) 1.25 MG (16879 UT) CAPS TAKE 1 CAPSULE BY MOUTH ONCE A WEEK 8 capsule 0    rosuvastatin (CRESTOR) 10 MG tablet Take 1 tablet (10 mg total) by mouth daily Do not start before May 3, 2024. 90 tablet 3    [DISCONTINUED] pantoprazole (PROTONIX) 40 mg tablet Take 1 tablet (40 mg total) by mouth daily 30 minutes before breakfast 30 tablet 3    escitalopram (LEXAPRO) 5 mg tablet Take 1 tablet (5 mg total) by mouth daily Do not start before May 3, 2024. 90 tablet 3     No current facility-administered medications on file prior to visit.   No Known Allergies   Current  Outpatient Medications on File Prior to Visit   Medication Sig Dispense Refill    Cholecalciferol (Vitamin D3) 1.25 MG (92985 UT) CAPS TAKE 1 CAPSULE BY MOUTH ONCE A WEEK 8 capsule 0    rosuvastatin (CRESTOR) 10 MG tablet Take 1 tablet (10 mg total) by mouth daily Do not start before May 3, 2024. 90 tablet 3    [DISCONTINUED] pantoprazole (PROTONIX) 40 mg tablet Take 1 tablet (40 mg total) by mouth daily 30 minutes before breakfast 30 tablet 3    escitalopram (LEXAPRO) 5 mg tablet Take 1 tablet (5 mg total) by mouth daily Do not start before May 3, 2024. 90 tablet 3     No current facility-administered medications on file prior to visit.      Social History     Tobacco Use    Smoking status: Never    Smokeless tobacco: Never   Vaping Use    Vaping status: Never Used   Substance and Sexual Activity    Alcohol use: Yes    Drug use: Never    Sexual activity: Not Currently     Birth control/protection: None        Objective   There were no vitals taken for this visit.     Physical Exam  Constitutional:       Appearance: Normal appearance.   Cardiovascular:      Rate and Rhythm: Normal rate and regular rhythm.      Heart sounds: Normal heart sounds.   Pulmonary:      Breath sounds: Normal breath sounds.   Abdominal:      General: Abdomen is flat. Bowel sounds are normal. There is no distension.      Palpations: Abdomen is soft. There is no mass.      Tenderness: There is no abdominal tenderness. There is no right CVA tenderness, left CVA tenderness, guarding or rebound.      Hernia: No hernia is present.   Neurological:      Mental Status: He is alert.         Administrative Statements   I have spent a total time of 30 minutes in caring for this patient on the day of the visit/encounter including Diagnostic results, Prognosis, Risks and benefits of tx options, Instructions for management, Patient and family education, Importance of tx compliance, Risk factor reductions, Impressions, Counseling / Coordination of care,  Documenting in the medical record, Reviewing / ordering tests, medicine, procedures  , Obtaining or reviewing history  , and Communicating with other healthcare professionals .

## 2025-02-10 DIAGNOSIS — E55.9 VITAMIN D DEFICIENCY: ICD-10-CM

## 2025-02-12 RX ORDER — CHOLECALCIFEROL (VITAMIN D3) 1250 MCG
1 CAPSULE ORAL WEEKLY
Qty: 8 CAPSULE | Refills: 0 | Status: SHIPPED | OUTPATIENT
Start: 2025-02-12

## 2025-03-25 LAB
25(OH)D3 SERPL-MCNC: 112 NG/ML (ref 30–100)
ALBUMIN SERPL-MCNC: 2.4 G/DL (ref 3.6–5.1)
ALBUMIN/GLOB SERPL: 0.6 (CALC) (ref 1–2.5)
ALP SERPL-CCNC: 136 U/L (ref 35–144)
ALT SERPL-CCNC: 122 U/L (ref 9–46)
AST SERPL-CCNC: 154 U/L (ref 10–35)
BILIRUB SERPL-MCNC: 2.4 MG/DL (ref 0.2–1.2)
BUN SERPL-MCNC: 17 MG/DL (ref 7–25)
BUN/CREAT SERPL: 10 (CALC) (ref 6–22)
CALCIUM SERPL-MCNC: 8.5 MG/DL (ref 8.6–10.3)
CHLORIDE SERPL-SCNC: 108 MMOL/L (ref 98–110)
CHOLEST SERPL-MCNC: 78 MG/DL
CHOLEST/HDLC SERPL: 1.7 (CALC)
CO2 SERPL-SCNC: 21 MMOL/L (ref 20–32)
CREAT SERPL-MCNC: 1.78 MG/DL (ref 0.7–1.3)
GFR/BSA.PRED SERPLBLD CYS-BASED-ARV: 44 ML/MIN/1.73M2
GLOBULIN SER CALC-MCNC: 4 G/DL (CALC) (ref 1.9–3.7)
GLUCOSE SERPL-MCNC: 123 MG/DL (ref 65–99)
HDLC SERPL-MCNC: 45 MG/DL
LDLC SERPL CALC-MCNC: 19 MG/DL (CALC)
NONHDLC SERPL-MCNC: 33 MG/DL (CALC)
POTASSIUM SERPL-SCNC: 2.9 MMOL/L (ref 3.5–5.3)
PROT SERPL-MCNC: 6.4 G/DL (ref 6.1–8.1)
PSA SERPL-MCNC: 0.97 NG/ML
SODIUM SERPL-SCNC: 136 MMOL/L (ref 135–146)
TRIGL SERPL-MCNC: 48 MG/DL
VIT B12 SERPL-MCNC: >2000 PG/ML (ref 200–1100)

## 2025-04-02 ENCOUNTER — RA CDI HCC (OUTPATIENT)
Dept: OTHER | Facility: HOSPITAL | Age: 55
End: 2025-04-02

## 2025-04-02 NOTE — PROGRESS NOTES
HCC coding opportunities          Chart Reviewed number of suggestions sent to Provider: 1    I13.0     Patients Insurance        Commercial Insurance: [employer]

## 2025-04-08 ENCOUNTER — OFFICE VISIT (OUTPATIENT)
Dept: FAMILY MEDICINE CLINIC | Facility: HOSPITAL | Age: 55
End: 2025-04-08

## 2025-04-08 VITALS
HEART RATE: 97 BPM | BODY MASS INDEX: 25.77 KG/M2 | SYSTOLIC BLOOD PRESSURE: 112 MMHG | WEIGHT: 174 LBS | OXYGEN SATURATION: 99 % | HEIGHT: 69 IN | DIASTOLIC BLOOD PRESSURE: 58 MMHG

## 2025-04-08 DIAGNOSIS — R73.01 IFG (IMPAIRED FASTING GLUCOSE): ICD-10-CM

## 2025-04-08 DIAGNOSIS — R18.8 CIRRHOSIS OF LIVER WITH ASCITES, UNSPECIFIED HEPATIC CIRRHOSIS TYPE  (HCC): Primary | ICD-10-CM

## 2025-04-08 DIAGNOSIS — K51.40 PSEUDOPOLYPOSIS OF COLON WITHOUT COMPLICATION, UNSPECIFIED PART OF COLON (HCC): ICD-10-CM

## 2025-04-08 DIAGNOSIS — K74.60 CIRRHOSIS OF LIVER WITH ASCITES, UNSPECIFIED HEPATIC CIRRHOSIS TYPE  (HCC): Primary | ICD-10-CM

## 2025-04-08 DIAGNOSIS — Z86.2 HISTORY OF ANEMIA DUE TO CHRONIC KIDNEY DISEASE: ICD-10-CM

## 2025-04-08 DIAGNOSIS — F41.8 SITUATIONAL ANXIETY: ICD-10-CM

## 2025-04-08 DIAGNOSIS — I50.33 ACUTE ON CHRONIC DIASTOLIC HEART FAILURE (HCC): ICD-10-CM

## 2025-04-08 DIAGNOSIS — N18.9 HISTORY OF ANEMIA DUE TO CHRONIC KIDNEY DISEASE: ICD-10-CM

## 2025-04-08 DIAGNOSIS — N18.4 CKD (CHRONIC KIDNEY DISEASE) STAGE 4, GFR 15-29 ML/MIN (HCC): ICD-10-CM

## 2025-04-08 PROCEDURE — 99215 OFFICE O/P EST HI 40 MIN: CPT | Performed by: STUDENT IN AN ORGANIZED HEALTH CARE EDUCATION/TRAINING PROGRAM

## 2025-04-08 RX ORDER — SPIRONOLACTONE 50 MG/1
50 TABLET, FILM COATED ORAL DAILY
Qty: 14 TABLET | Refills: 0 | Status: SHIPPED | OUTPATIENT
Start: 2025-04-08 | End: 2025-04-24

## 2025-04-08 RX ORDER — POTASSIUM CHLORIDE 1500 MG/1
20 TABLET, EXTENDED RELEASE ORAL 2 TIMES DAILY
Qty: 14 TABLET | Refills: 0 | Status: SHIPPED | OUTPATIENT
Start: 2025-04-08 | End: 2025-04-24

## 2025-04-08 RX ORDER — ESCITALOPRAM OXALATE 10 MG/1
10 TABLET ORAL DAILY
Qty: 90 TABLET | Refills: 1 | Status: SHIPPED | OUTPATIENT
Start: 2025-04-08

## 2025-04-08 NOTE — ASSESSMENT & PLAN NOTE
Wt Readings from Last 3 Encounters:   04/08/25 78.9 kg (174 lb)   01/08/25 78.2 kg (172 lb 6.4 oz)   12/12/24 73 kg (161 lb)   Climbing weight since December, significant portion of which would be abdominal fluid at this time.

## 2025-04-08 NOTE — PROGRESS NOTES
Name: Sudhir Bach      : 1970      MRN: [id number]  Encounter Provider: Sandra Mustafa DO  Encounter Date: 2025   Encounter department: [facility] [address]  :  Assessment & Plan  Cirrhosis of liver with ascites, unspecified hepatic cirrhosis type  (HCC)  New onset ascites, likely secondary to liver cirrhosis, had a history of heavy alcohol use, but declines that at this time.  Needs to be seen immediately by IR for paracentesis, as well as gastroenterology.  Staff from my office able to get him a visit later this week with gastroenterology.  Orders as below, case discussed with colleague as well due to the severity and sudden onset.    •  IR AMB Paracentesis; Future  •  Body fluid white cell count with differential; Standing  •  Body fluid culture and Gram stain; Standing  •  Total Protein, body fluid; Standing  •  Non-gynecologic cytology; Standing  •  Glucose, body fluid; Standing  •  Albumin, fluid; Standing  •  LD (LDH), Body Fluid; Standing  •  CBC and differential; Future  •  Protime-INR; Future  •  Ammonia; Future  •  spironolactone (ALDACTONE) 50 mg tablet; Take 1 tablet (50 mg total) by mouth daily  •  Comprehensive metabolic panel; Future  •  potassium chloride (Klor-Con M20) 20 mEq tablet; Take 1 tablet (20 mEq total) by mouth 2 (two) times a day for 7 days  IFG (impaired fasting glucose)  CMP in August in March showed elevated fasting glucose, will include A1c with lab work  Orders:  •  Hemoglobin A1C W/EAG With Reflex To Glycomark(R); Future    CKD (chronic kidney disease) stage 4, GFR 15-29 ml/min ([facility])  Lab Results   Component Value Date    EGFR 32 2025    EGFR 31 2025    EGFR 29 2025    CREATININE 2.21 (H) 2025    CREATININE 2.28 (H) 2025    CREATININE 2.40 (H) 2025   Already follows with nephrology, but I would like him to call Dr. Yeung to make appt.  Orders:  •  Comprehensive metabolic panel; Future    History of  anemia due to chronic kidney disease  Recheck with labs.   Orders:  •  CBC and differential; Future    Acute on chronic diastolic heart failure (HCC)      Wt Readings from Last 3 Encounters:   04/08/25 78.9 kg (174 lb)   01/08/25 78.2 kg (172 lb 6.4 oz)   12/12/24 73 kg (161 lb)   Climbing weight since December, significant portion of which would be abdominal fluid at this time.        Pseudopolyposis of colon without complication, unspecified part of colon (HCC)  Can follow with GI for this       Situational anxiety  Life stressors, wants to go back on lexapro at higher dose.   Refill given.   Orders:  •  escitalopram (LEXAPRO) 10 mg tablet; Take 1 tablet (10 mg total) by mouth daily    Return in about 4 weeks (around 5/6/2025) for F/U Chronic DX, Recheck Ascites .         History of Present Illness   HPI  Chief Complaint   Patient presents with   • Follow-up   • Muscle Pain     Limite strength    • Fatigue     Started over one month ago with not feeling well, coughing, runny nose, head & chest & exhaustion.  Couldn't get in or out of bed.   2 weeks ago had leg weakness, difficulty walking, legs would give out.     Fasting labs last week.     [facility] - kidney - Dr. Yeung, needs updated appt, not yet scheduled.   eGFR better at 44, but still low.     Rare alcohol use lately.   LFT's both climbing. 154 & 122   Noticing edema in belly. Weight jumping - was 145 2 weeks ago   LUQ pain (1 week now)   K+ low at 2.9     Vit D high on testing, but had just taken weekly dose   B12  too high     Wt Readings from Last 3 Encounters:   04/19/25 68.7 kg (151 lb 8 oz)   04/11/25 76.7 kg (169 lb 3.2 oz)   04/08/25 78.9 kg (174 lb)     Temp Readings from Last 3 Encounters:   04/24/25 97.5 °F (36.4 °C)   01/08/25 98.7 °F (37.1 °C) (Tympanic)   12/12/24 (!) 97.2 °F (36.2 °C)     BP Readings from Last 3 Encounters:   04/24/25 105/59   04/11/25 101/50   04/08/25 112/58     Pulse Readings from Last 3 Encounters:   04/24/25 85   04/08/25  "97   12/12/24 64     Review of Systems   Constitutional:  Positive for appetite change (Decreased) and fatigue. Negative for chills and fever.   HENT:  Positive for rhinorrhea.    Respiratory:  Positive for cough. Negative for shortness of breath.    Cardiovascular:  Negative for chest pain, palpitations and leg swelling.   Gastrointestinal:  Positive for abdominal distention and abdominal pain.   Neurological:  Positive for weakness (Generalized). Negative for light-headedness and headaches.     Objective   /58   Pulse 97   Ht 5' 9\" (1.753 m)   Wt 78.9 kg (174 lb)   SpO2 99%   BMI 25.70 kg/m²      Physical Exam  Vitals reviewed.   Constitutional:       General: He is not in acute distress.     Appearance: He is normal weight. He is ill-appearing (tired, lethargic).   HENT:      Head: Normocephalic and atraumatic.   Eyes:      General: No scleral icterus.        Right eye: No discharge.         Left eye: No discharge.   Cardiovascular:      Rate and Rhythm: Normal rate and regular rhythm.      Pulses: Normal pulses.      Heart sounds: Normal heart sounds. No murmur heard.  Pulmonary:      Effort: Pulmonary effort is normal. No respiratory distress.      Breath sounds: Normal breath sounds. No wheezing.   Abdominal:      General: There is distension.      Palpations: There is no mass.      Tenderness: There is abdominal tenderness. There is rebound. There is no right CVA tenderness or left CVA tenderness.      Comments: Distant bowel sounds, firm, distended abdomen  Fluid Shift felt with palpation   Musculoskeletal:      Cervical back: Normal range of motion and neck supple.      Right lower leg: No edema.      Left lower leg: No edema.   Skin:     Coloration: Skin is jaundiced (in face most so) and pale.   Neurological:      Mental Status: He is alert and oriented to person, place, and time.      Motor: Weakness (generalized, not his baseline activity level) present.   Psychiatric:         Mood and " Affect: Mood normal.         Behavior: Behavior normal.         Thought Content: Thought content normal.         Judgment: Judgment normal.         Administrative Statements   I have spent a total time of 55 minutes in caring for this patient on the day of the visit/encounter including Risks and benefits of tx options, Instructions for management, Patient and family education, Importance of tx compliance, Risk factor reductions, Impressions, Documenting in the medical record, Reviewing/placing orders in the medical record (including tests, medications, and/or procedures), Obtaining or reviewing history  , and Communicating with other healthcare professionals .

## 2025-04-08 NOTE — ASSESSMENT & PLAN NOTE
Lab Results   Component Value Date    EGFR 32 04/24/2025    EGFR 31 04/23/2025    EGFR 29 04/22/2025    CREATININE 2.21 (H) 04/24/2025    CREATININE 2.28 (H) 04/23/2025    CREATININE 2.40 (H) 04/22/2025   Already follows with nephrology, but I would like him to call Dr. Yeung to make appt.  Orders:  •  Comprehensive metabolic panel; Future

## 2025-04-10 ENCOUNTER — HOSPITAL ENCOUNTER (OUTPATIENT)
Dept: ULTRASOUND IMAGING | Facility: HOSPITAL | Age: 55
End: 2025-04-10
Payer: COMMERCIAL

## 2025-04-10 DIAGNOSIS — R79.89 ELEVATED LFTS: ICD-10-CM

## 2025-04-10 LAB
ALBUMIN SERPL-MCNC: 2.3 G/DL (ref 3.6–5.1)
ALBUMIN/GLOB SERPL: 0.5 (CALC) (ref 1–2.5)
ALP SERPL-CCNC: 102 U/L (ref 35–144)
ALT SERPL-CCNC: 22 U/L (ref 9–46)
AMMONIA PLAS-SCNC: 56 UMOL/L
AST SERPL-CCNC: 49 U/L (ref 10–35)
BASOPHILS # BLD AUTO: 66 CELLS/UL (ref 0–200)
BASOPHILS NFR BLD AUTO: 0.4 %
BILIRUB SERPL-MCNC: 2.5 MG/DL (ref 0.2–1.2)
BUN SERPL-MCNC: 13 MG/DL (ref 7–25)
BUN/CREAT SERPL: 6 (CALC) (ref 6–22)
CALCIUM SERPL-MCNC: 8.7 MG/DL (ref 8.6–10.3)
CHLORIDE SERPL-SCNC: 105 MMOL/L (ref 98–110)
CO2 SERPL-SCNC: 20 MMOL/L (ref 20–32)
CREAT SERPL-MCNC: 2.09 MG/DL (ref 0.7–1.3)
EOSINOPHIL # BLD AUTO: 66 CELLS/UL (ref 15–500)
EOSINOPHIL NFR BLD AUTO: 0.4 %
ERYTHROCYTE [DISTWIDTH] IN BLOOD BY AUTOMATED COUNT: 13.3 % (ref 11–15)
GFR/BSA.PRED SERPLBLD CYS-BASED-ARV: 37 ML/MIN/1.73M2
GLOBULIN SER CALC-MCNC: 4.6 G/DL (CALC) (ref 1.9–3.7)
GLUCOSE SERPL-MCNC: 91 MG/DL (ref 65–99)
HBA1C MFR BLD: 5.6 % OF TOTAL HGB
HCT VFR BLD AUTO: 21.3 % (ref 38.5–50)
HGB BLD-MCNC: 7.4 G/DL (ref 13.2–17.1)
INR PPP: 1.4
LYMPHOCYTES # BLD AUTO: 2411 CELLS/UL (ref 850–3900)
LYMPHOCYTES NFR BLD AUTO: 14.7 %
MCH RBC QN AUTO: 36.3 PG (ref 27–33)
MCHC RBC AUTO-ENTMCNC: 34.7 G/DL (ref 32–36)
MCV RBC AUTO: 104.4 FL (ref 80–100)
MONOCYTES # BLD AUTO: 1410 CELLS/UL (ref 200–950)
MONOCYTES NFR BLD AUTO: 8.6 %
NEUTROPHILS # BLD AUTO: ABNORMAL CELLS/UL (ref 1500–7800)
NEUTROPHILS NFR BLD AUTO: 75.9 %
PLATELET # BLD AUTO: 88 THOUSAND/UL (ref 140–400)
PMV BLD REES-ECKER: 11.8 FL (ref 7.5–12.5)
POTASSIUM SERPL-SCNC: 3.6 MMOL/L (ref 3.5–5.3)
PROT SERPL-MCNC: 6.9 G/DL (ref 6.1–8.1)
PROTHROMBIN TIME: 14.3 SEC (ref 9–11.5)
RBC # BLD AUTO: 2.04 MILLION/UL (ref 4.2–5.8)
SODIUM SERPL-SCNC: 133 MMOL/L (ref 135–146)
WBC # BLD AUTO: 16.4 THOUSAND/UL (ref 3.8–10.8)

## 2025-04-10 PROCEDURE — 76705 ECHO EXAM OF ABDOMEN: CPT

## 2025-04-11 ENCOUNTER — OFFICE VISIT (OUTPATIENT)
Dept: GASTROENTEROLOGY | Facility: CLINIC | Age: 55
End: 2025-04-11
Payer: COMMERCIAL

## 2025-04-11 VITALS
SYSTOLIC BLOOD PRESSURE: 101 MMHG | BODY MASS INDEX: 25.06 KG/M2 | DIASTOLIC BLOOD PRESSURE: 50 MMHG | WEIGHT: 169.2 LBS | HEIGHT: 69 IN

## 2025-04-11 DIAGNOSIS — R18.8 OTHER ASCITES: ICD-10-CM

## 2025-04-11 DIAGNOSIS — R74.01 TRANSAMINITIS: Primary | ICD-10-CM

## 2025-04-11 PROCEDURE — 99214 OFFICE O/P EST MOD 30 MIN: CPT | Performed by: STUDENT IN AN ORGANIZED HEALTH CARE EDUCATION/TRAINING PROGRAM

## 2025-04-11 NOTE — PROGRESS NOTES
Name: Sudhir Bach      : 1970      MRN: [id number]  Encounter Provider: Cruzito Choudhury DO  Encounter Date: 2025   Encounter department: [facility]  :  Assessment & Plan  Transaminitis  Concern for cirrhosis given new onset ascites and abnormal LFTs.  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  He denies heavy alcohol use, viral hepatitis exposure, autoimmune disease.  Will check autoimmune and viral serologies, ceruloplasmin and iron panel.  Will recheck LFTs in 1 week.  Agree with Dr. Mustafa's workup including ultrasound and paracentesis.  Will follow-up these results.  Agree with starting low-dose spironolactone.    Orders:    Alpha 1 Antitrypsin Phenotype; Future    Antimitochondrial antibody; Future    Anti-smooth muscle antibody, IgG; Future    Hepatitis B surface antigen; Future    Hepatitis C antibody; Future    Hepatitis B core antibody, total; Future    Hepatitis B surface antibody; Future    Ceruloplasmin; Future    Iron, TIBC and Ferritin Panel; Future    Comprehensive metabolic panel; Future    Other ascites  As above, suspect secondary to newly diagnosed cirrhosis.  Will follow-up ultrasound for confirmation.  Follow-up paracentesis fluid studies.  Depending on studies we will determine course of further workup.           History of Present Illness   Sudhir Bach is a 55 y.o. male who presents for evaluation of abnormal liver test and abdominal distention concerning for ascites.  He reports that he has had worsening abdominal swelling and gain of 19 pounds over the past 2 weeks.  He has not noticed any other swelling in his legs or arms.  He denies a history of known liver disease or heart failure.  No infectious signs or symptoms such as fevers, chills, rigors.  No night sweats.  Noted history of intermittent LFT elevations dating back to . Abdominal ultrasound at that time notable for mild  "hepatomegaly with severe diffuse fatty infiltration.  Denies heavy alcohol use history, viral hepatitis exposure, autoimmune disease, hypercoagulable disease.  He saw Dr. Mustafa on April 8 and had ultrasound and paracentesis with fluid studies ordered.  History of Present Illness    History obtained from: patient  Review of Systems A complete review of systems is negative other than that noted above in the HPI.         Objective   /50   Ht 5' 9\" (1.753 m)   Wt 76.7 kg (169 lb 3.2 oz)   BMI 24.99 kg/m²     Physical Exam   General Appearance: NAD, cooperative, alert  Eyes: Anicteric  GI: Abdominal distention, nontympanic, nontender  Rectal: Deferred  Musculoskeletal: No edema.  Skin:     No jaundice    Physical Exam      Results    Lab Results: I personally reviewed relevant lab results.       Results for orders placed during the hospital encounter of 12/12/24    EGD    Impression  Normal duodenum.  Biopsies taken for celiac disease  Mild diffuse gastritis.  Biopsies taken to evaluate for H. Pylori  Mild Schatzki's ring but otherwise normal esophagus  56F Morris dilator passed without difficulty      RECOMMENDATION:  Resume regular diet and medications  Call biopsy results in 1 to 2 weeks  Follow-up in the office            Sudhir Leslie MD        "

## 2025-04-16 ENCOUNTER — LAB (OUTPATIENT)
Dept: LAB | Facility: HOSPITAL | Age: 55
End: 2025-04-16
Payer: COMMERCIAL

## 2025-04-16 DIAGNOSIS — R74.01 TRANSAMINITIS: ICD-10-CM

## 2025-04-16 LAB
ALBUMIN SERPL BCG-MCNC: 2.2 G/DL (ref 3.5–5)
ALP SERPL-CCNC: 131 U/L (ref 34–104)
ALT SERPL W P-5'-P-CCNC: 37 U/L (ref 7–52)
ANION GAP SERPL CALCULATED.3IONS-SCNC: 9 MMOL/L (ref 4–13)
AST SERPL W P-5'-P-CCNC: 54 U/L (ref 13–39)
BILIRUB SERPL-MCNC: 3.21 MG/DL (ref 0.2–1)
BUN SERPL-MCNC: 17 MG/DL (ref 5–25)
CALCIUM ALBUM COR SERPL-MCNC: 11 MG/DL (ref 8.3–10.1)
CALCIUM SERPL-MCNC: 9.6 MG/DL (ref 8.4–10.2)
CHLORIDE SERPL-SCNC: 102 MMOL/L (ref 96–108)
CO2 SERPL-SCNC: 17 MMOL/L (ref 21–32)
CREAT SERPL-MCNC: 2.72 MG/DL (ref 0.6–1.3)
FERRITIN SERPL-MCNC: 1365 NG/ML (ref 30–336)
GFR SERPL CREATININE-BSD FRML MDRD: 25 ML/MIN/1.73SQ M
GLUCOSE P FAST SERPL-MCNC: 142 MG/DL (ref 65–99)
IRON SERPL-MCNC: 43 UG/DL (ref 50–212)
POTASSIUM SERPL-SCNC: 4.5 MMOL/L (ref 3.5–5.3)
PROT SERPL-MCNC: 7.5 G/DL (ref 6.4–8.4)
SODIUM SERPL-SCNC: 128 MMOL/L (ref 135–147)
TRANSFERRIN SERPL-MCNC: <75 MG/DL (ref 203–362)

## 2025-04-16 PROCEDURE — 86704 HEP B CORE ANTIBODY TOTAL: CPT

## 2025-04-16 PROCEDURE — 80053 COMPREHEN METABOLIC PANEL: CPT

## 2025-04-16 PROCEDURE — 86803 HEPATITIS C AB TEST: CPT

## 2025-04-16 PROCEDURE — 86706 HEP B SURFACE ANTIBODY: CPT

## 2025-04-16 PROCEDURE — 82104 ALPHA-1-ANTITRYPSIN PHENO: CPT

## 2025-04-16 PROCEDURE — 86381 MITOCHONDRIAL ANTIBODY EACH: CPT

## 2025-04-16 PROCEDURE — 83540 ASSAY OF IRON: CPT

## 2025-04-16 PROCEDURE — 82728 ASSAY OF FERRITIN: CPT

## 2025-04-16 PROCEDURE — 36415 COLL VENOUS BLD VENIPUNCTURE: CPT

## 2025-04-16 PROCEDURE — 82390 ASSAY OF CERULOPLASMIN: CPT

## 2025-04-16 PROCEDURE — 86015 ACTIN ANTIBODY EACH: CPT

## 2025-04-16 PROCEDURE — 82103 ALPHA-1-ANTITRYPSIN TOTAL: CPT

## 2025-04-16 PROCEDURE — 83550 IRON BINDING TEST: CPT

## 2025-04-16 PROCEDURE — 87340 HEPATITIS B SURFACE AG IA: CPT

## 2025-04-17 ENCOUNTER — TELEPHONE (OUTPATIENT)
Dept: GASTROENTEROLOGY | Facility: CLINIC | Age: 55
End: 2025-04-17

## 2025-04-17 ENCOUNTER — RESULTS FOLLOW-UP (OUTPATIENT)
Dept: GASTROENTEROLOGY | Facility: CLINIC | Age: 55
End: 2025-04-17

## 2025-04-17 LAB
ACTIN IGG SERPL-ACNC: 6 UNITS (ref 0–19)
CERULOPLASMIN SERPL-MCNC: 9.2 MG/DL (ref 16–31)
HBV CORE AB SER QL: NORMAL
HBV SURFACE AB SER-ACNC: <3 MIU/ML
HBV SURFACE AG SER QL: NORMAL
HCV AB SER QL: NORMAL
MITOCHONDRIA M2 IGG SER-ACNC: <20 UNITS (ref 0–20)

## 2025-04-17 NOTE — TELEPHONE ENCOUNTER
Phone call attempted re: results. No answer. Left VM that I would like to discuss with him ASAP regarding his rising bilirubin, dropping sodium and worsening creatinine. Will try back later on.

## 2025-04-17 NOTE — TELEPHONE ENCOUNTER
Sent clearance request for patient to have contrast study to Dr. Nancy Yeung at [facility] [phone]       Can someone please assist in reaching out to this patient's nephrologist at [facility], Dr. Yeung (in chart). We need to get a contrast study and he has CKD. Just want to be sure they are ok with it. I'll be in the hospital until Friday, but you can reach out to me if any issues.

## 2025-04-17 NOTE — TELEPHONE ENCOUNTER
Left message for pt to call back to let us know if he requested this refill or if it was an automated request from his pharmacy.     Please let clinical staff know when the call is returned

## 2025-04-19 ENCOUNTER — APPOINTMENT (EMERGENCY)
Dept: CT IMAGING | Facility: HOSPITAL | Age: 55
DRG: 432 | End: 2025-04-19

## 2025-04-19 ENCOUNTER — APPOINTMENT (EMERGENCY)
Dept: RADIOLOGY | Facility: HOSPITAL | Age: 55
DRG: 432 | End: 2025-04-19

## 2025-04-19 ENCOUNTER — HOSPITAL ENCOUNTER (INPATIENT)
Facility: HOSPITAL | Age: 55
LOS: 5 days | Discharge: HOME | DRG: 432 | End: 2025-04-24
Attending: EMERGENCY MEDICINE

## 2025-04-19 DIAGNOSIS — R18.8 ABDOMINAL ASCITES: ICD-10-CM

## 2025-04-19 DIAGNOSIS — D64.9 ANEMIA: ICD-10-CM

## 2025-04-19 DIAGNOSIS — E87.1 HYPONATREMIA: ICD-10-CM

## 2025-04-19 DIAGNOSIS — K74.60 CIRRHOSIS (HCC): ICD-10-CM

## 2025-04-19 DIAGNOSIS — R18.8 CIRRHOSIS OF LIVER WITH ASCITES, UNSPECIFIED HEPATIC CIRRHOSIS TYPE  (HCC): ICD-10-CM

## 2025-04-19 DIAGNOSIS — K86.89 PANCREATIC MASS: ICD-10-CM

## 2025-04-19 DIAGNOSIS — R53.1 GENERALIZED WEAKNESS: Primary | ICD-10-CM

## 2025-04-19 DIAGNOSIS — K74.60 CIRRHOSIS OF LIVER WITH ASCITES, UNSPECIFIED HEPATIC CIRRHOSIS TYPE  (HCC): ICD-10-CM

## 2025-04-19 DIAGNOSIS — N20.0 KIDNEY STONE: ICD-10-CM

## 2025-04-19 DIAGNOSIS — N18.9 CHRONIC RENAL FAILURE: ICD-10-CM

## 2025-04-19 DIAGNOSIS — E86.0 DEHYDRATION: ICD-10-CM

## 2025-04-19 PROBLEM — K86.2 PANCREATIC CYST: Status: ACTIVE | Noted: 2025-04-19

## 2025-04-19 PROBLEM — R65.10 SIRS (SYSTEMIC INFLAMMATORY RESPONSE SYNDROME) (HCC): Status: ACTIVE | Noted: 2025-04-19

## 2025-04-19 PROBLEM — R74.01 TRANSAMINITIS: Status: ACTIVE | Noted: 2025-04-19

## 2025-04-19 PROBLEM — R63.4 WEIGHT LOSS: Status: ACTIVE | Noted: 2025-04-19

## 2025-04-19 PROBLEM — K85.00 IDIOPATHIC PANCREATITIS: Status: ACTIVE | Noted: 2023-05-10

## 2025-04-19 PROBLEM — N17.9 AKI (ACUTE KIDNEY INJURY) (HCC): Status: ACTIVE | Noted: 2025-04-19

## 2025-04-19 LAB
2HR DELTA HS TROPONIN: -1 NG/L
ABO GROUP BLD: NORMAL
ABO GROUP BLD: NORMAL
ALBUMIN SERPL BCG-MCNC: 2.2 G/DL (ref 3.5–5)
ALP SERPL-CCNC: 111 U/L (ref 34–104)
ALT SERPL W P-5'-P-CCNC: 39 U/L (ref 7–52)
AMMONIA PLAS-SCNC: 28 UMOL/L (ref 18–72)
ANION GAP SERPL CALCULATED.3IONS-SCNC: 8 MMOL/L (ref 4–13)
APTT PPP: 47 SECONDS (ref 23–34)
AST SERPL W P-5'-P-CCNC: 61 U/L (ref 13–39)
BACTERIA UR QL AUTO: ABNORMAL /HPF
BASOPHILS # BLD AUTO: 0.05 THOUSANDS/ÂΜL (ref 0–0.1)
BASOPHILS NFR BLD AUTO: 0 % (ref 0–1)
BILIRUB SERPL-MCNC: 3.77 MG/DL (ref 0.2–1)
BILIRUB UR QL STRIP: NEGATIVE
BLD GP AB SCN SERPL QL: NEGATIVE
BUN SERPL-MCNC: 16 MG/DL (ref 5–25)
CALCIUM ALBUM COR SERPL-MCNC: 11.2 MG/DL (ref 8.3–10.1)
CALCIUM SERPL-MCNC: 9.8 MG/DL (ref 8.4–10.2)
CARDIAC TROPONIN I PNL SERPL HS: 5 NG/L (ref ?–50)
CARDIAC TROPONIN I PNL SERPL HS: 6 NG/L (ref ?–50)
CHLORIDE SERPL-SCNC: 103 MMOL/L (ref 96–108)
CHLORIDE UR-SCNC: 88 MMOL/L
CLARITY UR: CLEAR
CO2 SERPL-SCNC: 17 MMOL/L (ref 21–32)
COLOR UR: YELLOW
CREAT SERPL-MCNC: 2.73 MG/DL (ref 0.6–1.3)
CREAT UR-MCNC: 129 MG/DL
EOSINOPHIL # BLD AUTO: 0.06 THOUSAND/ÂΜL (ref 0–0.61)
EOSINOPHIL NFR BLD AUTO: 0 % (ref 0–6)
ERYTHROCYTE [DISTWIDTH] IN BLOOD BY AUTOMATED COUNT: 15.8 % (ref 11.6–15.1)
FLUAV RNA RESP QL NAA+PROBE: NEGATIVE
FLUBV RNA RESP QL NAA+PROBE: NEGATIVE
GFR SERPL CREATININE-BSD FRML MDRD: 25 ML/MIN/1.73SQ M
GLUCOSE SERPL-MCNC: 150 MG/DL (ref 65–140)
GLUCOSE UR STRIP-MCNC: NEGATIVE MG/DL
GRAN CASTS #/AREA URNS LPF: ABNORMAL /[LPF]
HCT VFR BLD AUTO: 23.6 % (ref 36.5–49.3)
HGB BLD-MCNC: 7.7 G/DL (ref 12–17)
HGB UR QL STRIP.AUTO: ABNORMAL
HYALINE CASTS #/AREA URNS LPF: ABNORMAL /LPF
IMM GRANULOCYTES # BLD AUTO: 0.13 THOUSAND/UL (ref 0–0.2)
IMM GRANULOCYTES NFR BLD AUTO: 1 % (ref 0–2)
INR PPP: 1.6 (ref 0.85–1.19)
KETONES UR STRIP-MCNC: NEGATIVE MG/DL
LEUKOCYTE ESTERASE UR QL STRIP: ABNORMAL
LIPASE SERPL-CCNC: 29 U/L (ref 11–82)
LYMPHOCYTES # BLD AUTO: 2.51 THOUSANDS/ÂΜL (ref 0.6–4.47)
LYMPHOCYTES NFR BLD AUTO: 11 % (ref 14–44)
MAGNESIUM SERPL-MCNC: 1.9 MG/DL (ref 1.9–2.7)
MCH RBC QN AUTO: 37 PG (ref 26.8–34.3)
MCHC RBC AUTO-ENTMCNC: 32.6 G/DL (ref 31.4–37.4)
MCV RBC AUTO: 114 FL (ref 82–98)
MONOCYTES # BLD AUTO: 1.69 THOUSAND/ÂΜL (ref 0.17–1.22)
MONOCYTES NFR BLD AUTO: 7 % (ref 4–12)
MUCOUS THREADS UR QL AUTO: ABNORMAL
NEUTROPHILS # BLD AUTO: 18.44 THOUSANDS/ÂΜL (ref 1.85–7.62)
NEUTS SEG NFR BLD AUTO: 81 % (ref 43–75)
NITRITE UR QL STRIP: NEGATIVE
NON-SQ EPI CELLS URNS QL MICRO: ABNORMAL /HPF
NRBC BLD AUTO-RTO: 0 /100 WBCS
OSMOLALITY UR: 420 MMOL/KG (ref 250–900)
PH UR STRIP.AUTO: 5.5 [PH]
PLATELET # BLD AUTO: 88 THOUSANDS/UL (ref 149–390)
PMV BLD AUTO: 11.8 FL (ref 8.9–12.7)
POTASSIUM SERPL-SCNC: 4.3 MMOL/L (ref 3.5–5.3)
PROT SERPL-MCNC: 7.9 G/DL (ref 6.4–8.4)
PROT UR STRIP-MCNC: ABNORMAL MG/DL
PROTHROMBIN TIME: 19.5 SECONDS (ref 12.3–15)
RBC # BLD AUTO: 2.08 MILLION/UL (ref 3.88–5.62)
RBC #/AREA URNS AUTO: ABNORMAL /HPF
RH BLD: POSITIVE
RH BLD: POSITIVE
RSV RNA RESP QL NAA+PROBE: NEGATIVE
SARS-COV-2 RNA RESP QL NAA+PROBE: NEGATIVE
SODIUM SERPL-SCNC: 128 MMOL/L (ref 135–147)
SODIUM UR-SCNC: 69 MMOL/L
SP GR UR STRIP.AUTO: 1.02 (ref 1–1.03)
SPECIMEN EXPIRATION DATE: NORMAL
TSH SERPL DL<=0.05 MIU/L-ACNC: 3.39 UIU/ML (ref 0.45–4.5)
UROBILINOGEN UR STRIP-ACNC: <2 MG/DL
UUN 24H UR-MCNC: 367 MG/DL
WBC # BLD AUTO: 22.88 THOUSAND/UL (ref 4.31–10.16)
WBC #/AREA URNS AUTO: ABNORMAL /HPF

## 2025-04-19 PROCEDURE — 74176 CT ABD & PELVIS W/O CONTRAST: CPT

## 2025-04-19 PROCEDURE — 80053 COMPREHEN METABOLIC PANEL: CPT | Performed by: EMERGENCY MEDICINE

## 2025-04-19 PROCEDURE — 85610 PROTHROMBIN TIME: CPT | Performed by: EMERGENCY MEDICINE

## 2025-04-19 PROCEDURE — 83935 ASSAY OF URINE OSMOLALITY: CPT

## 2025-04-19 PROCEDURE — 82570 ASSAY OF URINE CREATININE: CPT

## 2025-04-19 PROCEDURE — 93005 ELECTROCARDIOGRAM TRACING: CPT

## 2025-04-19 PROCEDURE — 86901 BLOOD TYPING SEROLOGIC RH(D): CPT | Performed by: EMERGENCY MEDICINE

## 2025-04-19 PROCEDURE — 82140 ASSAY OF AMMONIA: CPT | Performed by: EMERGENCY MEDICINE

## 2025-04-19 PROCEDURE — 84484 ASSAY OF TROPONIN QUANT: CPT | Performed by: EMERGENCY MEDICINE

## 2025-04-19 PROCEDURE — 99285 EMERGENCY DEPT VISIT HI MDM: CPT

## 2025-04-19 PROCEDURE — 71045 X-RAY EXAM CHEST 1 VIEW: CPT

## 2025-04-19 PROCEDURE — 0241U HB NFCT DS VIR RESP RNA 4 TRGT: CPT | Performed by: EMERGENCY MEDICINE

## 2025-04-19 PROCEDURE — 83690 ASSAY OF LIPASE: CPT | Performed by: EMERGENCY MEDICINE

## 2025-04-19 PROCEDURE — 84300 ASSAY OF URINE SODIUM: CPT

## 2025-04-19 PROCEDURE — 83735 ASSAY OF MAGNESIUM: CPT | Performed by: EMERGENCY MEDICINE

## 2025-04-19 PROCEDURE — 36415 COLL VENOUS BLD VENIPUNCTURE: CPT | Performed by: EMERGENCY MEDICINE

## 2025-04-19 PROCEDURE — 99285 EMERGENCY DEPT VISIT HI MDM: CPT | Performed by: EMERGENCY MEDICINE

## 2025-04-19 PROCEDURE — 99223 1ST HOSP IP/OBS HIGH 75: CPT

## 2025-04-19 PROCEDURE — 84540 ASSAY OF URINE/UREA-N: CPT

## 2025-04-19 PROCEDURE — 85025 COMPLETE CBC W/AUTO DIFF WBC: CPT | Performed by: EMERGENCY MEDICINE

## 2025-04-19 PROCEDURE — 84443 ASSAY THYROID STIM HORMONE: CPT | Performed by: EMERGENCY MEDICINE

## 2025-04-19 PROCEDURE — 82436 ASSAY OF URINE CHLORIDE: CPT

## 2025-04-19 PROCEDURE — 86923 COMPATIBILITY TEST ELECTRIC: CPT

## 2025-04-19 PROCEDURE — 87040 BLOOD CULTURE FOR BACTERIA: CPT

## 2025-04-19 PROCEDURE — 85730 THROMBOPLASTIN TIME PARTIAL: CPT | Performed by: EMERGENCY MEDICINE

## 2025-04-19 PROCEDURE — 86850 RBC ANTIBODY SCREEN: CPT | Performed by: EMERGENCY MEDICINE

## 2025-04-19 PROCEDURE — 86900 BLOOD TYPING SEROLOGIC ABO: CPT | Performed by: EMERGENCY MEDICINE

## 2025-04-19 PROCEDURE — 81001 URINALYSIS AUTO W/SCOPE: CPT | Performed by: EMERGENCY MEDICINE

## 2025-04-19 RX ORDER — ESCITALOPRAM OXALATE 10 MG/1
10 TABLET ORAL DAILY
Status: DISCONTINUED | OUTPATIENT
Start: 2025-04-20 | End: 2025-04-24 | Stop reason: HOSPADM

## 2025-04-19 RX ORDER — HEPARIN SODIUM 5000 [USP'U]/ML
5000 INJECTION, SOLUTION INTRAVENOUS; SUBCUTANEOUS EVERY 8 HOURS SCHEDULED
Status: DISCONTINUED | OUTPATIENT
Start: 2025-04-19 | End: 2025-04-24 | Stop reason: HOSPADM

## 2025-04-19 RX ORDER — SODIUM CHLORIDE, SODIUM GLUCONATE, SODIUM ACETATE, POTASSIUM CHLORIDE, MAGNESIUM CHLORIDE, SODIUM PHOSPHATE, DIBASIC, AND POTASSIUM PHOSPHATE .53; .5; .37; .037; .03; .012; .00082 G/100ML; G/100ML; G/100ML; G/100ML; G/100ML; G/100ML; G/100ML
1000 INJECTION, SOLUTION INTRAVENOUS ONCE
Status: DISCONTINUED | OUTPATIENT
Start: 2025-04-19 | End: 2025-04-24 | Stop reason: HOSPADM

## 2025-04-19 RX ORDER — PANTOPRAZOLE SODIUM 40 MG/1
40 TABLET, DELAYED RELEASE ORAL DAILY
Status: DISCONTINUED | OUTPATIENT
Start: 2025-04-20 | End: 2025-04-20

## 2025-04-19 RX ORDER — SPIRONOLACTONE 25 MG/1
50 TABLET ORAL DAILY
Status: DISCONTINUED | OUTPATIENT
Start: 2025-04-20 | End: 2025-04-24 | Stop reason: HOSPADM

## 2025-04-19 RX ORDER — POTASSIUM CHLORIDE 1500 MG/1
20 TABLET, EXTENDED RELEASE ORAL 2 TIMES DAILY
Status: DISCONTINUED | OUTPATIENT
Start: 2025-04-19 | End: 2025-04-19

## 2025-04-19 RX ADMIN — HEPARIN SODIUM 5000 UNITS: 5000 INJECTION INTRAVENOUS; SUBCUTANEOUS at 17:54

## 2025-04-19 NOTE — ASSESSMENT & PLAN NOTE
Patient met SIRS criteria through leukocytosis and tachypnea  She denied any fever, burning urination  UA showed 2-4 white cell count  No signs of infection  Will continue to monitor off antibiotic  Follow-up on blood cultures

## 2025-04-19 NOTE — ED PROVIDER NOTES
Time reflects when diagnosis was documented in both MDM as applicable and the Disposition within this note       Time User Action Codes Description Comment    4/19/2025 11:54 AM Ferdous, Komaira Add [R53.1] Generalized weakness     4/19/2025 11:54 AM Ferdous, Komaira Add [E87.1] Hyponatremia     4/19/2025 11:54 AM Ferdous, Komaira Add [N17.9] BUD (acute kidney injury) (HCC)     4/19/2025 11:54 AM Ferdous, Komaira Add [D64.9] Anemia     4/19/2025 11:55 AM Ferdous, Komaira Add [K74.60] Cirrhosis (HCC)     4/19/2025 11:55 AM Ferdous, Komaira Add [E86.0] Dehydration     4/19/2025  3:03 PM Ferdous, Komaira Add [R18.8] Abdominal ascites     4/19/2025  3:04 PM Ferdous, Komaira Remove [N17.9] BUD (acute kidney injury) (HCC)     4/19/2025  3:04 PM Ferdous, Komaira Add [N18.9] Chronic renal failure     4/19/2025  3:40 PM Ferdous, Komaira Add [K86.89] Pancreatic mass     4/19/2025  3:40 PM Ferdous, Komaira Add [N20.0] Kidney stone           ED Disposition       ED Disposition   Admit    Condition   Stable    Date/Time   Sat Apr 19, 2025 11:55 AM    Comment   Case was discussed with Dr. Bearden and the patient's admission status was agreed to be Admission Status: inpatient status to the service of Dr. Bearden.               Assessment & Plan       Medical Decision Making  Obtain blood work, type and screen, viral swab, EKG, chest x-ray  Give gentle fluid hydration continue to monitor patient for any worsening    Patient's lab work showed leukocytosis however no obvious signs of infection noted.  Hemoglobin was low compared to previous study.  Patient denied any hematochezia or hematemesis.  No signs of active bleeding noted.  Patient is anemia may be in light of patient's CKD.  CT scan did not show any signs of infection however there was concern for worsening pancreatic mass as well as moderate ascites.  At this time patient is admitted for further evaluation management.  There was an incidental finding of kidney stone on CT  "scan without any hydronephrosis.  Case was discussed with urology who did not recommend any acute urologic intervention at this time.  Patient agrees with admission plans.    Amount and/or Complexity of Data Reviewed  Labs: ordered.  Radiology: ordered.    Risk  Decision regarding hospitalization.        ED Course as of 04/19/25 1605   Sat Apr 19, 2025   1500 I am having difficulty opening the CT study.  I spoke with radiologist and read the CT scan, Dr. Leach, who notes the following findings:  6.5 cm cystic mass in pancreatic body/tail needs MRI/MRCP Mod -lg volume ascites with heterogeneous cirrhotic liver 4 mm L UPJ calculus without hydronephrosis Gallstones Diverticulosis      1510 Patient CT scan today showed concern for kidney stone.  Case discussed with urologist whomay be an incidental finding.  No hydronephrosis noted.  No BUD noted.  At this time no acute intervention recommended by urology team.       Medications - No data to display    ED Risk Strat Scores                    No data recorded                            History of Present Illness       Chief Complaint   Patient presents with    Fatigue     Pt arrives and states \"I feel weak and fatigue for the last couple weeks\" Pt reports abd pain. Denies any nausea, vomiting, fevers.        Past Medical History:   Diagnosis Date    Anxiety     Chronic kidney disease     Colon polyp     Hyperlipidemia     Hypertension       Past Surgical History:   Procedure Laterality Date    COLONOSCOPY      EGD AND COLONOSCOPY  12/12/2024    US GUIDED KIDNEY BIOPSY  11/14/2018      Family History   Problem Relation Age of Onset    Hypertension Father     Colon cancer Neg Hx       Social History     Tobacco Use    Smoking status: Never    Smokeless tobacco: Never   Vaping Use    Vaping status: Never Used   Substance Use Topics    Alcohol use: Not Currently    Drug use: Never      E-Cigarette/Vaping    E-Cigarette Use Never User       E-Cigarette/Vaping Substances    "   I have reviewed and agree with the history as documented.     55-year-old male with past history of nonalcoholic liver cirrhosis, anxiety, hypertension, CKD, hyperlipidemia, presents to the ED for worsening weakness, decreased appetite over the past week.  Patient is routinely followed by GI.  Patient had some blood work done that showed concern for hyponatremia as well as worsening renal function.  GI called patient yesterday and told him to come to the ED for further workup.  Patient came to the ED today for further evaluation.  Patient states that he feels extremely weak and not able to do much around the house.  Patient said that he has had decreased appetite.  Patient states that he has eaten or drank very little over the past 3 days.  In the emergency department, patient is having difficulty sitting up on his own due to weakness.  Patient appears pale.  Patient denies any bloody stool.  Patient denies bleeding from any other site.      History provided by:  Patient  Fatigue  Associated symptoms: no abdominal pain, no arthralgias, no chest pain, no cough, no dysuria, no fever, no seizures, no shortness of breath and no vomiting        Review of Systems   Constitutional:  Positive for appetite change and fatigue. Negative for chills and fever.   HENT:  Negative for ear pain and sore throat.    Eyes:  Negative for pain and visual disturbance.   Respiratory:  Negative for cough and shortness of breath.    Cardiovascular:  Negative for chest pain and palpitations.   Gastrointestinal:  Negative for abdominal pain and vomiting.   Genitourinary:  Negative for dysuria and hematuria.   Musculoskeletal:  Negative for arthralgias and back pain.   Skin:  Negative for color change and rash.   Neurological:  Positive for weakness. Negative for seizures and syncope.   All other systems reviewed and are negative.          Objective       ED Triage Vitals   Temperature Pulse Blood Pressure Respirations SpO2 Patient Position -  Orthostatic VS   04/19/25 1035 04/19/25 1033 04/19/25 1033 04/19/25 1033 04/19/25 1033 04/19/25 1033   (!) 97.4 °F (36.3 °C) 91 120/54 20 100 % Sitting      Temp Source Heart Rate Source BP Location FiO2 (%) Pain Score    04/19/25 1035 04/19/25 1033 04/19/25 1033 -- 04/19/25 1033    Oral Monitor Right arm  6      Vitals      Date and Time Temp Pulse SpO2 Resp BP Pain Score FACES Pain Rating User   04/19/25 1500 -- 79 100 % 17 101/59 -- --    04/19/25 1400 -- 75 97 % 18 98/58 -- --    04/19/25 1300 -- 78 96 % 18 102/60 -- --    04/19/25 1200 -- 79 95 % 18 97/54 -- --    04/19/25 1100 -- 82 98 % 18 98/54 -- --    04/19/25 1056 -- 100 -- 16 96/53 -- -- MB   04/19/25 1055 -- -- -- -- -- 6 --    04/19/25 1054 -- 97 -- 16 101/59 -- -- MB   04/19/25 1052 -- 97 -- 16 90/53 -- -- MB   04/19/25 1035 97.4 °F (36.3 °C) -- -- -- -- -- --    04/19/25 1033 -- 91 100 % 20 120/54 6 --             Physical Exam  Vitals and nursing note reviewed.   Constitutional:       General: He is not in acute distress.     Appearance: He is well-developed.   HENT:      Head: Normocephalic and atraumatic.      Mouth/Throat:      Mouth: Mucous membranes are dry.      Comments: Mucous membranes are dry.  Eyes:      Conjunctiva/sclera: Conjunctivae normal.   Cardiovascular:      Rate and Rhythm: Normal rate and regular rhythm.      Heart sounds: No murmur heard.  Pulmonary:      Effort: Pulmonary effort is normal. No respiratory distress.      Breath sounds: Normal breath sounds.   Abdominal:      General: There is distension.      Palpations: Abdomen is soft.      Tenderness: There is no abdominal tenderness.      Comments: Mild abdominal distention noted.  Abdomen is otherwise soft with bowel sound present all 4 quadrants.  No tenderness noted to palpation of abdomen.   Musculoskeletal:         General: No swelling.      Cervical back: Neck supple.   Skin:     General: Skin is warm and dry.      Capillary Refill: Capillary refill  takes less than 2 seconds.      Comments: Very pale in appearance.   Neurological:      General: No focal deficit present.      Mental Status: He is alert and oriented to person, place, and time.      Comments: Generalized weakness noted without any obvious focal neurodeficits.   Psychiatric:         Mood and Affect: Mood normal.         Behavior: Behavior normal.         Results Reviewed       Procedure Component Value Units Date/Time    Urine Microscopic [[id number]]  (Abnormal) Collected: 04/19/25 1446    Lab Status: Final result Specimen: Urine, Other Updated: 04/19/25 1502     RBC, UA 1-2 /hpf      WBC, UA 2-4 /hpf      Epithelial Cells Moderate /hpf      Bacteria, UA Innumerable /hpf      MUCUS THREADS Innumerable     Hyaline Casts, UA 20-30 /lpf      Granular Casts, UA 3-5    UA w Reflex to Microscopic w Reflex to Culture [[id number]]  (Abnormal) Collected: 04/19/25 1446    Lab Status: Final result Specimen: Urine, Other Updated: 04/19/25 1501     Color, UA Yellow     Clarity, UA Clear     Specific Gravity, UA 1.025     pH, UA 5.5     Leukocytes, UA Trace     Nitrite, UA Negative     Protein, UA 30 (1+) mg/dl      Glucose, UA Negative mg/dl      Ketones, UA Negative mg/dl      Urobilinogen, UA <2.0 mg/dl      Bilirubin, UA Negative     Occult Blood, UA Trace    FLU/RSV/COVID - if FLU/RSV clinically relevant (2hr TAT) [[id number]]  (Normal) Collected: 04/19/25 1038    Lab Status: Final result Specimen: Nares from Nose Updated: 04/19/25 1316     SARS-CoV-2 Negative     INFLUENZA A PCR Negative     INFLUENZA B PCR Negative     RSV PCR Negative    Narrative:      This test has been performed using the CoV-2/Flu/RSV plus assay on the [employer] GeneXpert platform. This test has been validated by the  and verified by the performing laboratory.     This test is designed to amplify and detect the following: nucleocapsid (N), envelope (E), and RNA-dependent RNA polymerase (RdRP) genes of the SARS-CoV-2 genome;  matrix (M), basic polymerase (PB2), and acidic protein (PA) segments of the influenza A genome; matrix (M) and non-structural protein (NS) segments of the influenza B genome, and the nucleocapsid genes of RSV A and RSV B.     Positive results are indicative of the presence of Flu A, Flu B, RSV, and/or SARS-CoV-2 RNA. Positive results for SARS-CoV-2 or suspected novel influenza should be reported to state, local, or federal health departments according to local reporting requirements.      All results should be assessed in conjunction with clinical presentation and other laboratory markers for clinical management.     FOR PEDIATRIC PATIENTS - copy/paste COVID Guidelines URL to browser: [email or web address]       HS Troponin I 2hr [[id number]]  (Normal) Collected: 04/19/25 1228    Lab Status: Final result Specimen: Blood from Arm, Right Updated: 04/19/25 1254     hs TnI 2hr 5 ng/L      Delta 2hr hsTnI -1 ng/L     Protime-INR [[id number]]  (Abnormal) Collected: 04/19/25 1203    Lab Status: Final result Specimen: Blood from Arm, Right Updated: 04/19/25 1221     Protime 19.5 seconds      INR 1.60    Narrative:      INR Therapeutic Range    Indication                                             INR Range      Atrial Fibrillation                                               2.0-3.0  Hypercoagulable State                                    2.0.2.3  Left Ventricular Asist Device                            2.0-3.0  Mechanical Heart Valve                                  -    Aortic(with afib, MI, embolism, HF, LA enlargement,    and/or coagulopathy)                                     2.0-3.0 (2.5-3.5)     Mitral                                                             2.5-3.5  Prosthetic/Bioprosthetic Heart Valve               2.0-3.0  Venous thromboembolism (VTE: VT, PE        2.0-3.0    APTT [[id number]]  (Abnormal) Collected: 04/19/25 1203    Lab Status: Final result  Specimen: Blood from Arm, Right Updated: 04/19/25 1221     PTT 47 seconds     Lipase [[id number]]  (Normal) Collected: 04/19/25 1038    Lab Status: Final result Specimen: Blood from Arm, Left Updated: 04/19/25 1220     Lipase 29 u/L     Magnesium [[id number]]  (Normal) Collected: 04/19/25 1038    Lab Status: Final result Specimen: Blood from Arm, Left Updated: 04/19/25 1220     Magnesium 1.9 mg/dL     HS Troponin I 4hr [[id number]]     Lab Status: No result Specimen: Blood     iFOBT (FIT) [[id number]]     Lab Status: No result Specimen: Stool     CBC and differential [[id number]]  (Abnormal) Collected: 04/19/25 1038    Lab Status: Final result Specimen: Blood from Arm, Left Updated: 04/19/25 1122     WBC 22.88 Thousand/uL      RBC 2.08 Million/uL      Hemoglobin 7.7 g/dL      Hematocrit 23.6 %       fL      MCH 37.0 pg      MCHC 32.6 g/dL      RDW 15.8 %      MPV 11.8 fL      Platelets 88 Thousands/uL      nRBC 0 /100 WBCs      Segmented % 81 %      Immature Grans % 1 %      Lymphocytes % 11 %      Monocytes % 7 %      Eosinophils Relative 0 %      Basophils Relative 0 %      Absolute Neutrophils 18.44 Thousands/µL      Absolute Immature Grans 0.13 Thousand/uL      Absolute Lymphocytes 2.51 Thousands/µL      Absolute Monocytes 1.69 Thousand/µL      Eosinophils Absolute 0.06 Thousand/µL      Basophils Absolute 0.05 Thousands/µL     Narrative:      This is an appended report.  These results have been appended to a previously verified report.    TSH, 3rd generation with Free T4 reflex [[id number]]  (Normal) Collected: 04/19/25 1038    Lab Status: Final result Specimen: Blood from Arm, Left Updated: 04/19/25 1115     TSH 3RD GENERATON 3.388 uIU/mL     Ammonia [[id number]]  (Normal) Collected: 04/19/25 1049    Lab Status: Final result Specimen: Blood from Arm, Left Updated: 04/19/25 1111     Ammonia 28 umol/L     HS Troponin 0hr (reflex protocol) [[id number]]  (Normal) Collected: 04/19/25 1038    Lab Status: Final result  Specimen: Blood from Arm, Left Updated: 04/19/25 1106     hs TnI 0hr 6 ng/L     Comprehensive metabolic panel [[id number]]  (Abnormal) Collected: 04/19/25 1038    Lab Status: Final result Specimen: Blood from Arm, Left Updated: 04/19/25 1058     Sodium 128 mmol/L      Potassium 4.3 mmol/L      Chloride 103 mmol/L      CO2 17 mmol/L      ANION GAP 8 mmol/L      BUN 16 mg/dL      Creatinine 2.73 mg/dL      Glucose 150 mg/dL      Calcium 9.8 mg/dL      Corrected Calcium 11.2 mg/dL      AST 61 U/L      ALT 39 U/L      Alkaline Phosphatase 111 U/L      Total Protein 7.9 g/dL      Albumin 2.2 g/dL      Total Bilirubin 3.77 mg/dL      eGFR 25 ml/min/1.73sq m     Narrative:      National Kidney Disease Foundation guidelines for Chronic Kidney Disease (CKD):     Stage 1 with normal or high GFR (GFR > 90 mL/min/1.73 square meters)    Stage 2 Mild CKD (GFR = 60-89 mL/min/1.73 square meters)    Stage 3A Moderate CKD (GFR = 45-59 mL/min/1.73 square meters)    Stage 3B Moderate CKD (GFR = 30-44 mL/min/1.73 square meters)    Stage 4 Severe CKD (GFR = 15-29 mL/min/1.73 square meters)    Stage 5 End Stage CKD (GFR <15 mL/min/1.73 square meters)  Note: GFR calculation is accurate only with a steady state creatinine            CT abdomen pelvis wo contrast   Final Interpretation by Arun Leach DO (04/19 1502)   Addendum (preliminary) 1 of 1 by Arun Leach DO (04/19 1502)   ADDENDUM:      Findings were communicated with Dr. CYNDI MARAVILLA on 4/19/2025 at 3:01    PM via HIPPA compliant electronic text messaging. Apparently the initial    report was not visible in PACS.      Final      1.  6.5 cm cystic mass in the pancreatic body/tail, indeterminate. Additional 11 mm pancreatic head cyst. Further characterization with contrast-enhanced MRI/MRCP recommended.      2.  Heterogeneous liver with findings suggestive of cirrhosis and fatty infiltration.      3.  Moderate to large quantity of abdominopelvic ascites.      4.   Cholelithiasis.      5.  4 mm left UPJ calculus without obstruction. Additional 5 mm nonobstructing left renal calculus.      6.  Colonic diverticulosis without diverticulitis.      7.  Limited study without IV or oral contrast.      The study was marked in EPIC for immediate notification and follow-up.      Workstation performed: [id number]         XR chest 1 view portable   Final Interpretation by Torito Villalpando MD (04/19 1342)      No acute cardiopulmonary disease.            Resident: Filipe Valenzuela I, the attending radiologist, have reviewed the images and agree with the final report above.      Workstation performed: [id number]             ECG 12 Lead Documentation Only    Date/Time: 4/19/2025 11:23 AM    Performed by: Sheila Davis DO  Authorized by: Sheila Davis DO    Indications / Diagnosis:  Weakness  ECG reviewed by me, the ED Provider: yes    Patient location:  ED  Previous ECG:     Previous ECG:  Unavailable    Comparison to cardiac monitor: Yes    Interpretation:     Interpretation: non-specific    Comments:      Sinus rhythm, rate 79, normal axis, normal intervals, no acute ST elevations noted, normal amplitude T waves noted throughout suggesting nonspecific T wave abnormalities, no previous EKG available for comparison.      ED Medication and Procedure Management   Prior to Admission Medications   Prescriptions Last Dose Informant Patient Reported? Taking?   Cholecalciferol (Vitamin D3) 1.25 MG (22940 UT) CAPS   No No   Sig: Take 1 capsule (50,000 Units total) by mouth once a week   escitalopram (LEXAPRO) 10 mg tablet   No No   Sig: Take 1 tablet (10 mg total) by mouth daily   pantoprazole (PROTONIX) 40 mg tablet   No No   Sig: Take 1 tablet (40 mg total) by mouth daily 30 minutes before breakfast   potassium chloride (Klor-Con M20) 20 mEq tablet   No No   Sig: Take 1 tablet (20 mEq total) by mouth 2 (two) times a day for 7 days   rosuvastatin (CRESTOR) 10 MG tablet  Self No No    Sig: Take 1 tablet (10 mg total) by mouth daily Do not start before May 3, 2024.   spironolactone (ALDACTONE) 50 mg tablet   No No   Sig: Take 1 tablet (50 mg total) by mouth daily      Facility-Administered Medications: None     Patient's Medications   Discharge Prescriptions    No medications on file     No discharge procedures on file.  ED SEPSIS DOCUMENTATION   Time reflects when diagnosis was documented in both MDM as applicable and the Disposition within this note       Time User Action Codes Description Comment    4/19/2025 11:54 AM Ferdous, Komaira Add [R53.1] Generalized weakness     4/19/2025 11:54 AM Ferdous, Komaira Add [E87.1] Hyponatremia     4/19/2025 11:54 AM Ferdous, Komaira Add [N17.9] BUD (acute kidney injury) (HCC)     4/19/2025 11:54 AM Ferdous, Komaira Add [D64.9] Anemia     4/19/2025 11:55 AM Ferdous, Komaira Add [K74.60] Cirrhosis (HCC)     4/19/2025 11:55 AM Ferdous, Komaira Add [E86.0] Dehydration     4/19/2025  3:03 PM Ferdous, Komaira Add [R18.8] Abdominal ascites     4/19/2025  3:04 PM Ferdous, Komaira Remove [N17.9] BUD (acute kidney injury) (HCC)     4/19/2025  3:04 PM Ferdous, Komaira Add [N18.9] Chronic renal failure     4/19/2025  3:40 PM Ferdous, Komaira Add [K86.89] Pancreatic mass     4/19/2025  3:40 PM Ferdous, Komaira Add [N20.0] Kidney stone                  Komaira Ferdous, DO  04/19/25 1605

## 2025-04-19 NOTE — ASSESSMENT & PLAN NOTE
Patient with past medical history of CKD  Patient follows with [facility]  Patient current creatinine is 2.73 and baseline was 1.7  Patient was started recently on spironolactone  Plan  Hold spironolactone  Avoid nephrotoxic agent  Consult nephrology appreciate recommendations

## 2025-04-19 NOTE — ASSESSMENT & PLAN NOTE
Patient presented with weight loss  Patient reported loss of 19 pounds in the last 2 weeks  Patient also complained of weakness, lethargy  Patient had recent EGD and colonoscopy were unremarkable  In the setting of elevated liver enzymes and BUD  Patient will need MRI/MRCP with contrast for the pancreatic cyst however in the setting of BUD will wait for nephrology recommendations

## 2025-04-19 NOTE — H&P
H&P - Hospitalist   Name: Sudhir Bach 55 y.o. male I MRN: [id number]  Unit/Bed#: -01 I Date of Admission: 4/19/2025   Date of Service: 4/19/2025 I Hospital Day: 0     Assessment & Plan  Transaminitis  Patient was past medical history of transaminitis  Patient presents with generalized weakness and lethargy  Patient follow-up with gastroenterology  Was concern for cirrhosis probably due to [place] due to new onset ascites and abnormal liver function test as patient denied any alcohol intake  Patient was seen recently by gastroenterology and autoimmune, viral serology, ceruloplasmin, iron panel were ordered  Chronic viral serology are nonreactive  Ceruloplasmin is 9.2  Patient recent labs showed worsening kidney function with creatinine of 2.73, AST 61, sodium 128, T. bili 3.77, INR 1.4  Patient was referred to the ED for extensive workup  Patient presented with weakness  GI recommended starting low-dose of spironolactone  Plan  Consult gastroenterology appreciate recommendations  Will hold spironolactone in the setting of BUD  Trend LFTs  Trend INR    BUD (acute kidney injury) (HCC)  Patient with past medical history of CKD  Patient follows with [facility] nephrology  Patient current creatinine is 2.73 and baseline was 1.7  Patient was started recently on spironolactone  Plan  Hold spironolactone  Avoid nephrotoxic agent  Consult nephrology appreciate recommendations  Pancreatic cyst  Patient with history of idiopathic pancreatitis  Patient presented with mild abdominal pain  Patient with elevated T. bili and jaundice on exam  CT abdomen pelvis without contrast 6.5 cm cystic mass in the pancreatic body/tail, indeterminate. Additional 11 mm pancreatic head cyst. Further characterization with contrast-enhanced MRI/MRCP recommended.  In the setting of BUD we will hold off MRI MRCP with contrast waiting for nephrology recommendations  SIRS (systemic inflammatory response syndrome) (HCC)  Patient met SIRS criteria  through leukocytosis and tachypnea  She denied any fever, burning urination  UA showed 2-4 white cell count  No signs of infection  Will continue to monitor off antibiotic  Follow-up on blood cultures  Weight loss  Patient presented with weight loss  Patient reported loss of 19 pounds in the last 2 weeks  Patient also complained of weakness, lethargy  Patient had recent EGD and colonoscopy were unremarkable  In the setting of elevated liver enzymes and BUD  Patient will need MRI/MRCP with contrast for the pancreatic cyst however in the setting of BUD will wait for nephrology recommendations    History of CVA (cerebrovascular accident)  Patient with past medical history of stroke with no residual deficits  Currently on statin  Will hold statin in the setting of elevated liver enzymes  Mixed hyperlipidemia  On statin  Will hold statin due to elevated liver enzymes  Anemia  Patient has history of anemia  Current hemoglobin 7.7  Iron is 43 and ferritin 1365  Probably due to anemia of chronic disease plus iron deficiency  Follow-up on nephrology recommendations  Maintain hemoglobin above 7 and transfuse as needed  CKD (chronic kidney disease) stage 4, GFR 15-29 ml/min ([facility])  Lab Results   Component Value Date    EGFR 25 04/19/2025    EGFR 25 04/16/2025    EGFR 37 (L) 04/09/2025    CREATININE 2.73 (H) 04/19/2025    CREATININE 2.72 (H) 04/16/2025    CREATININE 2.09 (H) 04/09/2025   Avoid nephrotoxic agents  Follow-up on creatinine  Vitamin D deficiency  On vitamin D once weekly    Idiopathic pancreatitis  Patient with past medical history of idiopathic pancreatitis        VTE Pharmacologic Prophylaxis:   High Risk (Score >/= 5) - Pharmacological DVT Prophylaxis Ordered: heparin. Sequential Compression Devices Ordered.  Code Status: Level 1 - Full Code       Anticipated Length of Stay: Patient will be admitted on an inpatient basis with an anticipated length of stay of greater than 2 midnights secondary to generalized  weakness.    History of Present Illness   Chief Complaint: Generalized weakness    Sudhir Bach is a 55 y.o. male with a PMH of transaminitis, idiopathic pancreatitis, anxiety, vitamin D deficiency, chronic renal failure who presents with generalized weakness and lethargy.  Patient also reported weight loss of 19 pounds in the last 2 weeks.  Patient was seen by gastroenterology who ordered extensive testing and was advised to come to the ED due to elevated bilirubin.  In the ED vitals blood pressure 120/54, respiration 20, heart rate 91, temperature nine 7.4.  Labs sodium 128, creatinine 2.73, AST 61, ALT 39, platelet 88, white cell count 22, hemoglobin 7.7, ammonia 28.  CT abdomen pelvis showed 6.5 cm cystic mass in the pancreatic body/tail, indeterminate. Additional 11 mm pancreatic head cyst. Further characterization with contrast-enhanced MRI/MRCP recommended. Heterogeneous liver with findings suggestive of cirrhosis and fatty infiltration. Moderate to large quantity of abdominopelvic ascites. Cholelithiasis. 4 mm left UPJ calculus without obstruction. Additional 5 mm nonobstructing left renal calculus. Colonic diverticulosis without diverticulitis.  Patient is level 1 full code.  Patient will be admitted to medicine with gastroenterology and nephrology consults.       Review of Systems   Constitutional:  Positive for activity change, appetite change and unexpected weight change. Negative for diaphoresis and fatigue.   HENT:  Negative for ear discharge, hearing loss, nosebleeds, rhinorrhea and sneezing.    Eyes:  Negative for photophobia and redness.   Respiratory:  Negative for apnea, choking, chest tightness, wheezing and stridor.    Cardiovascular:  Negative for chest pain, palpitations and leg swelling.   Gastrointestinal:  Positive for abdominal pain. Negative for abdominal distention, blood in stool, diarrhea, rectal pain and vomiting.   Endocrine: Negative for polydipsia.   Genitourinary:  Negative for  flank pain, genital sores, hematuria, penile pain, testicular pain and urgency.   Musculoskeletal:  Negative for arthralgias and neck pain.   Neurological:  Positive for weakness. Negative for seizures, syncope, facial asymmetry and numbness.   Psychiatric/Behavioral:  Negative for behavioral problems, confusion, dysphoric mood and self-injury. The patient is not hyperactive.        Historical Information   Past Medical History:   Diagnosis Date    Anxiety     Chronic kidney disease     Colon polyp     Hyperlipidemia     Hypertension      Past Surgical History:   Procedure Laterality Date    COLONOSCOPY      EGD AND COLONOSCOPY  12/12/2024    US GUIDED KIDNEY BIOPSY  11/14/2018     Social History     Tobacco Use    Smoking status: Never    Smokeless tobacco: Never   Vaping Use    Vaping status: Never Used   Substance and Sexual Activity    Alcohol use: Not Currently    Drug use: Never    Sexual activity: Not Currently     Birth control/protection: None     E-Cigarette/Vaping    E-Cigarette Use Never User      E-Cigarette/Vaping Substances     Family history non-contributory  Social History:  Marital Status: Legally        Meds/Allergies   I have reviewed home medications with patient personally.  Prior to Admission medications    Medication Sig Start Date End Date Taking? Authorizing Provider   Cholecalciferol (Vitamin D3) 1.25 MG (51041 UT) CAPS Take 1 capsule (50,000 Units total) by mouth once a week 2/12/25  Yes Sandra Mustafa DO   escitalopram (LEXAPRO) 10 mg tablet Take 1 tablet (10 mg total) by mouth daily 4/8/25  Yes Sandra Mustafa DO   pantoprazole (PROTONIX) 40 mg tablet Take 1 tablet (40 mg total) by mouth daily 30 minutes before breakfast 1/8/25  Yes Sandra Ku PA-C   rosuvastatin (CRESTOR) 10 MG tablet Take 1 tablet (10 mg total) by mouth daily Do not start before May 3, 2024. 5/3/24  Yes Sandra Mustafa DO   spironolactone (ALDACTONE) 50 mg tablet Take 1 tablet (50 mg total) by mouth  daily 4/8/25  Yes Sandra Mustafa,    potassium chloride (Klor-Con M20) 20 mEq tablet Take 1 tablet (20 mEq total) by mouth 2 (two) times a day for 7 days 4/8/25 4/15/25  Sandra Mustafa      No Known Allergies    Objective :  Temp:  [97.3 °F (36.3 °C)-97.4 °F (36.3 °C)] 97.3 °F (36.3 °C)  HR:  [] 72  BP: ()/(49-60) 103/49  Resp:  [16-20] 18  SpO2:  [94 %-100 %] 94 %  O2 Device: None (Room air)    Physical Exam  Constitutional:       General: He is not in acute distress.     Appearance: He is ill-appearing. He is not toxic-appearing or diaphoretic.   HENT:      Nose: Nose normal. No congestion or rhinorrhea.      Mouth/Throat:      Mouth: Mucous membranes are moist.      Pharynx: No posterior oropharyngeal erythema.   Eyes:      General: Scleral icterus present.   Cardiovascular:      Rate and Rhythm: Normal rate and regular rhythm.      Pulses: Normal pulses.      Heart sounds: No murmur heard.     No gallop.   Pulmonary:      Effort: No respiratory distress.      Breath sounds: No stridor. No wheezing or rhonchi.   Abdominal:      General: Abdomen is flat. There is no distension.      Palpations: There is no mass.      Tenderness: There is abdominal tenderness.      Hernia: No hernia is present.   Musculoskeletal:         General: No swelling, tenderness, deformity or signs of injury. Normal range of motion.   Skin:     Coloration: Skin is pale. Skin is not jaundiced.      Findings: No bruising.   Neurological:      General: No focal deficit present.      Mental Status: He is alert.      Cranial Nerves: No cranial nerve deficit.      Sensory: No sensory deficit.      Motor: No weakness.                 Lab Results: I have reviewed the following results:  Results from last 7 days   Lab Units 04/19/25  1038   WBC Thousand/uL 22.88*   HEMOGLOBIN g/dL 7.7*   HEMATOCRIT % 23.6*   PLATELETS Thousands/uL 88*   SEGS PCT % 81*   LYMPHO PCT % 11*   MONO PCT % 7   EOS PCT % 0     Results from last 7 days    Lab Units 04/19/25  1038   SODIUM mmol/L 128*   POTASSIUM mmol/L 4.3   CHLORIDE mmol/L 103   CO2 mmol/L 17*   BUN mg/dL 16   CREATININE mg/dL 2.73*   ANION GAP mmol/L 8   CALCIUM mg/dL 9.8   ALBUMIN g/dL 2.2*   TOTAL BILIRUBIN mg/dL 3.77*   ALK PHOS U/L 111*   ALT U/L 39   AST U/L 61*   GLUCOSE RANDOM mg/dL 150*     Results from last 7 days   Lab Units 04/19/25  1203   INR  1.60*         Lab Results   Component Value Date    HGBA1C 5.6 04/09/2025    HGBA1C 5.2 09/16/2024    HGBA1C 5.8 (H) 06/15/2023           Imaging Results Review: I reviewed radiology reports from this admission including: CT abdomen/pelvis.  Other Study Results Review: EKG was reviewed.     Administrative Statements   I have spent a total time of 60 minutes in caring for this patient on the day of the visit/encounter including Diagnostic results, Prognosis, and Risks and benefits of tx options.    ** Please Note: This note has been constructed using a voice recognition system. **

## 2025-04-19 NOTE — ASSESSMENT & PLAN NOTE
Patient with past medical history of stroke with no residual deficits  Currently on statin  Will hold statin in the setting of elevated liver enzymes

## 2025-04-19 NOTE — ASSESSMENT & PLAN NOTE
Patient was past medical history of transaminitis  Patient presents with generalized weakness and lethargy  Patient follow-up with gastroenterology  Was concern for cirrhosis probably due to [place] due to new onset ascites and abnormal liver function test as patient denied any alcohol intake  Patient was seen recently by gastroenterology and autoimmune, viral serology, ceruloplasmin, iron panel were ordered  Chronic viral serology are nonreactive  Ceruloplasmin is 9.2  Patient recent labs showed worsening kidney function with creatinine of 2.73, AST 61, sodium 128, T. bili 3.77, INR 1.4  Patient was referred to the ED for extensive workup  Patient presented with weakness  GI recommended starting low-dose of spironolactone  Plan  Consult gastroenterology appreciate recommendations  Will hold spironolactone in the setting of BUD  Trend LFTs  Trend INR

## 2025-04-19 NOTE — ASSESSMENT & PLAN NOTE
Patient with history of idiopathic pancreatitis  Patient presented with mild abdominal pain  Patient with elevated T. bili and jaundice on exam  CT abdomen pelvis without contrast 6.5 cm cystic mass in the pancreatic body/tail, indeterminate. Additional 11 mm pancreatic head cyst. Further characterization with contrast-enhanced MRI/MRCP recommended.  In the setting of BUD we will hold off MRI MRCP with contrast waiting for nephrology recommendations

## 2025-04-19 NOTE — ASSESSMENT & PLAN NOTE
Patient has history of anemia  Current hemoglobin 7.7  Iron is 43 and ferritin 1365  Probably due to anemia of chronic disease plus iron deficiency  Follow-up on nephrology recommendations  Maintain hemoglobin above 7 and transfuse as needed

## 2025-04-19 NOTE — ASSESSMENT & PLAN NOTE
Lab Results   Component Value Date    EGFR 25 04/19/2025    EGFR 25 04/16/2025    EGFR 37 (L) 04/09/2025    CREATININE 2.73 (H) 04/19/2025    CREATININE 2.72 (H) 04/16/2025    CREATININE 2.09 (H) 04/09/2025   Avoid nephrotoxic agents  Follow-up on creatinine

## 2025-04-20 ENCOUNTER — APPOINTMENT (INPATIENT)
Dept: MRI IMAGING | Facility: HOSPITAL | Age: 55
DRG: 432 | End: 2025-04-20

## 2025-04-20 PROBLEM — K74.69 OTHER CIRRHOSIS OF LIVER (HCC): Status: ACTIVE | Noted: 2025-04-19

## 2025-04-20 PROBLEM — E87.1 HYPONATREMIA: Status: ACTIVE | Noted: 2025-04-20

## 2025-04-20 LAB
25(OH)D3 SERPL-MCNC: >120 NG/ML (ref 30–100)
ALBUMIN SERPL BCG-MCNC: 1.9 G/DL (ref 3.5–5)
ALP SERPL-CCNC: 94 U/L (ref 34–104)
ALT SERPL W P-5'-P-CCNC: 32 U/L (ref 7–52)
ANION GAP SERPL CALCULATED.3IONS-SCNC: 7 MMOL/L (ref 4–13)
AST SERPL W P-5'-P-CCNC: 43 U/L (ref 13–39)
BASOPHILS # BLD AUTO: 0.04 THOUSANDS/ÂΜL (ref 0–0.1)
BASOPHILS NFR BLD AUTO: 0 % (ref 0–1)
BILIRUB SERPL-MCNC: 2.96 MG/DL (ref 0.2–1)
BUN SERPL-MCNC: 16 MG/DL (ref 5–25)
CALCIUM ALBUM COR SERPL-MCNC: 11.1 MG/DL (ref 8.3–10.1)
CALCIUM SERPL-MCNC: 9.4 MG/DL (ref 8.4–10.2)
CHLORIDE SERPL-SCNC: 105 MMOL/L (ref 96–108)
CO2 SERPL-SCNC: 19 MMOL/L (ref 21–32)
CORTIS SERPL-MCNC: 9.6 UG/DL
CREAT SERPL-MCNC: 2.47 MG/DL (ref 0.6–1.3)
EOSINOPHIL # BLD AUTO: 0.09 THOUSAND/ÂΜL (ref 0–0.61)
EOSINOPHIL NFR BLD AUTO: 1 % (ref 0–6)
ERYTHROCYTE [DISTWIDTH] IN BLOOD BY AUTOMATED COUNT: 15.8 % (ref 11.6–15.1)
FOLATE SERPL-MCNC: 6.1 NG/ML
GFR SERPL CREATININE-BSD FRML MDRD: 28 ML/MIN/1.73SQ M
GLUCOSE SERPL-MCNC: 116 MG/DL (ref 65–140)
HCT VFR BLD AUTO: 20.3 % (ref 36.5–49.3)
HEMOCCULT STL QL IA: POSITIVE
HGB BLD-MCNC: 6.8 G/DL (ref 12–17)
HGB BLD-MCNC: 7.9 G/DL (ref 12–17)
IMM GRANULOCYTES # BLD AUTO: 0.11 THOUSAND/UL (ref 0–0.2)
IMM GRANULOCYTES NFR BLD AUTO: 1 % (ref 0–2)
INR PPP: 1.57 (ref 0.85–1.19)
LYMPHOCYTES # BLD AUTO: 3.35 THOUSANDS/ÂΜL (ref 0.6–4.47)
LYMPHOCYTES NFR BLD AUTO: 19 % (ref 14–44)
MAGNESIUM SERPL-MCNC: 2 MG/DL (ref 1.9–2.7)
MCH RBC QN AUTO: 37.2 PG (ref 26.8–34.3)
MCHC RBC AUTO-ENTMCNC: 33.5 G/DL (ref 31.4–37.4)
MCV RBC AUTO: 111 FL (ref 82–98)
MONOCYTES # BLD AUTO: 1.55 THOUSAND/ÂΜL (ref 0.17–1.22)
MONOCYTES NFR BLD AUTO: 9 % (ref 4–12)
NEUTROPHILS # BLD AUTO: 12.61 THOUSANDS/ÂΜL (ref 1.85–7.62)
NEUTS SEG NFR BLD AUTO: 70 % (ref 43–75)
NRBC BLD AUTO-RTO: 0 /100 WBCS
OSMOLALITY UR/SERPL-RTO: 289 MMOL/KG (ref 282–298)
PHOSPHATE SERPL-MCNC: 2.9 MG/DL (ref 2.7–4.5)
PLATELET # BLD AUTO: 64 THOUSANDS/UL (ref 149–390)
PMV BLD AUTO: 11.7 FL (ref 8.9–12.7)
POTASSIUM SERPL-SCNC: 4 MMOL/L (ref 3.5–5.3)
PROT SERPL-MCNC: 6.8 G/DL (ref 6.4–8.4)
PROTHROMBIN TIME: 19.2 SECONDS (ref 12.3–15)
RBC # BLD AUTO: 1.83 MILLION/UL (ref 3.88–5.62)
SODIUM SERPL-SCNC: 131 MMOL/L (ref 135–147)
URATE SERPL-MCNC: 4.7 MG/DL (ref 3.5–8.5)
WBC # BLD AUTO: 17.75 THOUSAND/UL (ref 4.31–10.16)

## 2025-04-20 PROCEDURE — 85025 COMPLETE CBC W/AUTO DIFF WBC: CPT

## 2025-04-20 PROCEDURE — P9016 RBC LEUKOCYTES REDUCED: HCPCS

## 2025-04-20 PROCEDURE — 74183 MRI ABD W/O CNTR FLWD CNTR: CPT

## 2025-04-20 PROCEDURE — 82306 VITAMIN D 25 HYDROXY: CPT | Performed by: INTERNAL MEDICINE

## 2025-04-20 PROCEDURE — 83735 ASSAY OF MAGNESIUM: CPT

## 2025-04-20 PROCEDURE — 86335 IMMUNFIX E-PHORSIS/URINE/CSF: CPT | Performed by: INTERNAL MEDICINE

## 2025-04-20 PROCEDURE — 84165 PROTEIN E-PHORESIS SERUM: CPT | Performed by: INTERNAL MEDICINE

## 2025-04-20 PROCEDURE — G0328 FECAL BLOOD SCRN IMMUNOASSAY: HCPCS

## 2025-04-20 PROCEDURE — 30233N1 TRANSFUSION OF NONAUTOLOGOUS RED BLOOD CELLS INTO PERIPHERAL VEIN, PERCUTANEOUS APPROACH: ICD-10-PCS | Performed by: INTERNAL MEDICINE

## 2025-04-20 PROCEDURE — 81256 HFE GENE: CPT | Performed by: PHYSICIAN ASSISTANT

## 2025-04-20 PROCEDURE — 99232 SBSQ HOSP IP/OBS MODERATE 35: CPT | Performed by: INTERNAL MEDICINE

## 2025-04-20 PROCEDURE — 84550 ASSAY OF BLOOD/URIC ACID: CPT

## 2025-04-20 PROCEDURE — 86334 IMMUNOFIX E-PHORESIS SERUM: CPT | Performed by: INTERNAL MEDICINE

## 2025-04-20 PROCEDURE — 84166 PROTEIN E-PHORESIS/URINE/CSF: CPT | Performed by: INTERNAL MEDICINE

## 2025-04-20 PROCEDURE — 85018 HEMOGLOBIN: CPT | Performed by: INTERNAL MEDICINE

## 2025-04-20 PROCEDURE — 99223 1ST HOSP IP/OBS HIGH 75: CPT | Performed by: INTERNAL MEDICINE

## 2025-04-20 PROCEDURE — 83930 ASSAY OF BLOOD OSMOLALITY: CPT

## 2025-04-20 PROCEDURE — 83521 IG LIGHT CHAINS FREE EACH: CPT | Performed by: INTERNAL MEDICINE

## 2025-04-20 PROCEDURE — 85610 PROTHROMBIN TIME: CPT

## 2025-04-20 PROCEDURE — 83970 ASSAY OF PARATHORMONE: CPT | Performed by: INTERNAL MEDICINE

## 2025-04-20 PROCEDURE — 82397 CHEMILUMINESCENT ASSAY: CPT | Performed by: INTERNAL MEDICINE

## 2025-04-20 PROCEDURE — 82533 TOTAL CORTISOL: CPT

## 2025-04-20 PROCEDURE — A9585 GADOBUTROL INJECTION: HCPCS | Performed by: INTERNAL MEDICINE

## 2025-04-20 PROCEDURE — 82746 ASSAY OF FOLIC ACID SERUM: CPT | Performed by: PHYSICIAN ASSISTANT

## 2025-04-20 PROCEDURE — 84100 ASSAY OF PHOSPHORUS: CPT

## 2025-04-20 PROCEDURE — 80053 COMPREHEN METABOLIC PANEL: CPT

## 2025-04-20 PROCEDURE — 99254 IP/OBS CNSLTJ NEW/EST MOD 60: CPT | Performed by: INTERNAL MEDICINE

## 2025-04-20 RX ORDER — SODIUM CHLORIDE, SODIUM GLUCONATE, SODIUM ACETATE, POTASSIUM CHLORIDE, MAGNESIUM CHLORIDE, SODIUM PHOSPHATE, DIBASIC, AND POTASSIUM PHOSPHATE .53; .5; .37; .037; .03; .012; .00082 G/100ML; G/100ML; G/100ML; G/100ML; G/100ML; G/100ML; G/100ML
50 INJECTION, SOLUTION INTRAVENOUS CONTINUOUS
Status: DISCONTINUED | OUTPATIENT
Start: 2025-04-20 | End: 2025-04-21

## 2025-04-20 RX ORDER — ALPRAZOLAM 0.5 MG
0.5 TABLET ORAL ONCE
Status: COMPLETED | OUTPATIENT
Start: 2025-04-20 | End: 2025-04-20

## 2025-04-20 RX ORDER — PANTOPRAZOLE SODIUM 40 MG/10ML
40 INJECTION, POWDER, LYOPHILIZED, FOR SOLUTION INTRAVENOUS EVERY 12 HOURS SCHEDULED
Status: DISCONTINUED | OUTPATIENT
Start: 2025-04-20 | End: 2025-04-24 | Stop reason: HOSPADM

## 2025-04-20 RX ORDER — GADOBUTROL 604.72 MG/ML
6 INJECTION INTRAVENOUS
Status: COMPLETED | OUTPATIENT
Start: 2025-04-20 | End: 2025-04-20

## 2025-04-20 RX ADMIN — PANTOPRAZOLE SODIUM 40 MG: 40 INJECTION, POWDER, FOR SOLUTION INTRAVENOUS at 21:36

## 2025-04-20 RX ADMIN — ALPRAZOLAM 0.5 MG: 0.5 TABLET ORAL at 14:25

## 2025-04-20 RX ADMIN — GADOBUTROL 6 ML: 604.72 INJECTION INTRAVENOUS at 15:41

## 2025-04-20 RX ADMIN — HEPARIN SODIUM 5000 UNITS: 5000 INJECTION INTRAVENOUS; SUBCUTANEOUS at 14:18

## 2025-04-20 RX ADMIN — ESCITALOPRAM OXALATE 10 MG: 10 TABLET ORAL at 08:50

## 2025-04-20 RX ADMIN — HEPARIN SODIUM 5000 UNITS: 5000 INJECTION INTRAVENOUS; SUBCUTANEOUS at 21:42

## 2025-04-20 NOTE — ASSESSMENT & PLAN NOTE
Hemoglobin 7.7 on admission, down from 12.5 in September 2023. MCV high 114. Vitamin B12 greater than 2000. No recent folate level. Iron 43 and ferritin 1365. Recent EGD and colonoscopy from December 2024 significant for gastritis and mild Schatzki's ring, colon polyps, diverticulosis, hemorrhoids. No current signs of overt GI bleeding.    - Monitor H&H and transfuse to keep hemoglobin greater than 7  - Check folate level  - Monitor for GI bleeding  - Patient will need repeat EGD to screen for esophageal varices given new decompensation

## 2025-04-20 NOTE — PLAN OF CARE
Problem: Potential for Falls  Goal: Patient will remain free of falls  Description: INTERVENTIONS:- Educate patient/family on patient safety including physical limitations- Instruct patient to call for assistance with activity - Consult OT/PT to assist with strengthening/mobility - Keep Call bell within reach- Keep bed low and locked with side rails adjusted as appropriate- Keep care items and personal belongings within reach- Initiate and maintain comfort rounds- Make Fall Risk Sign visible to staff- Offer Toileting every 2 Hours, in advance of need- Initiate/Maintain bed alarm- Obtain necessary fall risk management equipment: socks  Problem: DISCHARGE PLANNING  Goal: Discharge to home or other facility with appropriate resources  Description: INTERVENTIONS:- Identify barriers to discharge w/patient and caregiver- Arrange for needed discharge resources and transportation as appropriate- Identify discharge learning needs (meds, wound care, etc.)- Arrange for interpretive services to assist at discharge as needed- Refer to Case Management Department for coordinating discharge planning if the patient needs post-hospital services based on physician/advanced practitioner order or complex needs related to functional status, cognitive ability, or social support system  Outcome: Progressing   - Apply yellow socks and bracelet for high fall risk patients- Consider moving patient to room near nurses station  Outcome: Progressing

## 2025-04-20 NOTE — ASSESSMENT & PLAN NOTE
Current hemoglobin 6.8  1 unit leukocyte reduced PRBC ordered per primary team  Patient has history of anemia  Recommend H&H check post infusion  Monitor hemoglobin keep greater than 7

## 2025-04-20 NOTE — ASSESSMENT & PLAN NOTE
US right upper quadrant, 4/10/2025: Cirrhosis with severe hepatic steatosis and severe hepatomegaly with sequela of portal hypertension including moderate ascites   Patient due for inpatient paracentesis with IR

## 2025-04-20 NOTE — ASSESSMENT & PLAN NOTE
Patient with past medical history of CKD  Patient follows with [facility] nephrology  Patient current creatinine is 2.73 and baseline was 1.7  Patient was started recently on spironolactone  Plan  Hold spironolactone  Avoid nephrotoxic agent   Nephrology following

## 2025-04-20 NOTE — ASSESSMENT & PLAN NOTE
Patient was past medical history of transaminitis  Patient presents with generalized weakness and lethargy  Patient follow-up with gastroenterology  Was concern for cirrhosis probably due to [place] due to new onset ascites and abnormal liver function test as patient denied any alcohol intake  Patient was seen recently by gastroenterology and autoimmune, viral serology, ceruloplasmin, iron panel were ordered  Chronic viral serology are nonreactive  Ceruloplasmin is 9.2  Patient recent labs showed worsening kidney function with creatinine of 2.73, AST 61, sodium 128, T. bili 3.77, INR 1.4  Patient was referred to the ED for extensive workup  Patient presented with weakness  GI recommended starting low-dose of spironolactone  Plan  Consult gastroenterology appreciate recommendations  Will hold spironolactone in the setting of BUD  Trend MELD labs  GI planning for liver biopsy

## 2025-04-20 NOTE — ASSESSMENT & PLAN NOTE
Patient with history of idiopathic pancreatitis. No evidence of pancreatitis at present time. CT shows apparent cystic mass in the pancreatic body/tail measuring approximately 6.5 x 4.3 cm and another 11 mm cyst along the medial pancreatic head. Suspect cysts may be pseudocysts related to prior episode of pancreatitis.    - Follow-up results of MRI/MRCP for further characterization of the cysts  - He may require EUS for sampling of the cysts depending on MRI/MRCP results

## 2025-04-20 NOTE — ASSESSMENT & PLAN NOTE
Etiology: multifactorial: d/t poor oral intake + hypovolemia/dehydration +   Patient has past history of severe BUD requiring renal replacement therapy in 2018 per care everywhere.  Current creatinine 2.47, admission creatinine 2.73  Baseline creatinine 1.8-2.0 since 2024.  Prior to that 3.0-4.0 since 2023  UA: Trace blood and leukocytes, +1 protein, moderate epithelial cells, 20-30 hyaline casts, 3-5 granular casts, innumerable bacteria and mucus threads  Urine electrolytes: Urine chloride 88, urine creatinine 129, urine sodium 69, urine urea 367   FENa: 1.0% -indeterminate.  Can be seen with either prerenal or intrinsic BUD  FEUrea: 43.9% - >35% suggestive of intrinsic BUD  Received Plasma-Lyte IV fluid bolus 1 L in ER  No recent IV contrast with imaging  PCXR: Negative  4/19/2025 CT A/P wo:   KIDNEYS/URETERS: 5 mm nonobstructing calculus left mid kidney. 4 mm calculus left UPJ, without obstruction   Per [facility] meds, not on ACE or ARB.   Spironolactone 50 mg oral daily currently on hold  Plans:  Would recommend gentle IV fluids as showing improvement in creatinine as well as sodium  Recommend avoiding all NSAIDs, nephrotoxic agents, as well as IV contrast  Avoid hypotension and any perturbations in blood pressure  Closely monitor I&O, daily weight and labs

## 2025-04-20 NOTE — ASSESSMENT & PLAN NOTE
History of idiopathic pancreatitis  Presented with mild abdominal pain, currently denies pain  CT A/P wo:  6.5 cm cystic mass in the pancreatic body/tail   Patient due for MRI/MRCP with contrast however due to BUD would hold off on any administration of IV contrast

## 2025-04-20 NOTE — ASSESSMENT & PLAN NOTE
Etiology: likely d/t poor oral intake + diuretic use + hypovolemic  Current sodium 131, on admission 128  Patient given 1 L Plasma-Lyte bolus in ER with improvement  Decreased appetite per patient  Workup:   Urine Osmo 420, No serum Osmo   TSH 3.388, No uric acid, No AM cortisol  Glucose appropriate  Plan:  Would recommend continuing with gentle IV fluids as patient has shown improvement in sodium  Serum Osmo, uric acid and a.m. cortisol placed for completion of workup  Continue holding diuretic  Avoid over correcting to avoid ODS, preferably 4-6pts in 24 hours. No greater than 133 sodium by 10pm tonight

## 2025-04-20 NOTE — ASSESSMENT & PLAN NOTE
BUD on CKD, current creatinine 2.73 compared to baseline 1.7. Patient recently initiated on spironolactone.    - Agree with holding spironolactone  - Appreciate nephrology recommendations

## 2025-04-20 NOTE — ASSESSMENT & PLAN NOTE
Patient presented with weight loss  Patient reported loss of 19 pounds in the last 2 weeks  Patient also complained of weakness, lethargy  Patient had recent EGD and colonoscopy were unremarkable  In the setting of elevated liver enzymes and BUD    Mri abdomen pending

## 2025-04-20 NOTE — ASSESSMENT & PLAN NOTE
Etiology:   Patient follows outpatient with [facility] nephrology. Nancy Yeung  Patient has past history of severe BUD requiring renal replacement therapy in 2018 per care everywhere. Patient then placed on dialysis for about 3 months then recovered from 11/2018-02/2019  Patient was then encouraged and referred to Dr. Nini Shah at [place] [facility] due to symptoms of lethargy, loss of balance confusion and feeling nauseated.  Symptoms improved and patient was not seen by Dr. Shah.   Current creatinine 2.47, with admission creatinine 2.73  Recent A1c 4/9/2025 - 5.6

## 2025-04-20 NOTE — PLAN OF CARE
Problem: PAIN - ADULT  Goal: Verbalizes/displays adequate comfort level or baseline comfort level  Description: Interventions:- Encourage patient to monitor pain and request assistance- Assess pain using appropriate pain scale- Administer analgesics based on type and severity of pain and evaluate response- Implement non-pharmacological measures as appropriate and evaluate response- Consider cultural and social influences on pain and pain management- Notify physician/advanced practitioner if interventions unsuccessful or patient reports new pain  Outcome: Progressing     Problem: Knowledge Deficit  Goal: Patient/family/caregiver demonstrates understanding of disease process, treatment plan, medications, and discharge instructions  Description: Complete learning assessment and assess knowledge base.Interventions:- Provide teaching at level of understanding- Provide teaching via preferred learning methods  Outcome: Progressing     Problem: Nutrition/Hydration-ADULT  Goal: Nutrient/Hydration intake appropriate for improving, restoring or maintaining nutritional needs  Description: Monitor and assess patient's nutrition/hydration status for malnutrition. Collaborate with interdisciplinary team and initiate plan and interventions as ordered.  Monitor patient's weight and dietary intake as ordered or per policy. Utilize nutrition screening tool and intervene as necessary. Determine patient's food preferences and provide high-protein, high-caloric foods as appropriate. INTERVENTIONS:- Monitor oral intake, urinary output, labs, and treatment plans- Assess nutrition and hydration status and recommend course of action- Evaluate amount of meals eaten- Assist patient with eating if necessary - Allow adequate time for meals- Recommend/ encourage appropriate diets, oral nutritional supplements, and vitamin/mineral supplements- Order, calculate, and assess calorie counts as needed- Recommend, monitor, and adjust tube feedings  and TPN/PPN based on assessed needs- Assess need for intravenous fluids- Provide specific nutrition/hydration education as appropriate- Include patient/family/caregiver in decisions related to nutrition  Outcome: Progressing

## 2025-04-20 NOTE — ASSESSMENT & PLAN NOTE
Newly diagnosed. There is evidence of cirrhosis based on labs, imaging, and physical exam. Bilirubin 2.96. Albumin 1.9. INR 1.57. Platelets 64,000. RUQ US 4/10/2025 shows cirrhosis with severe steatosis, hepatomegaly, sequela of portal HTN including moderate ascites. CT A/P 4/19 again shows evidence of cirrhosis, moderate to large ascites. No significant alcohol use in the past. Extensive liver serologic workup significant for low ceruloplasmin suspicious for Roque's disease and elevated ferritin 1365, concern for hemochromatosis. Autoimmune and viral serologies negative. MELD 3.0 = 29 on 4/20.    - Monitor MELD labs daily  - Check HFE gene mutation  - We will discuss case with our hepatologist tomorrow, patient may need liver biopsy given uncertainty regarding the etiology for his cirrhosis  - Recommend close outpatient GI follow-up  - Due to low ceruloplasmin, he will eventually need a workup for Roque's disease including 24-hour urine copper.    Ascites:  -Recommend diagnostic and therapeutic paracentesis to rule out SBP and confirm ascites is secondary to cirrhosis/portal HTN  -Recommend low sodium diet  -Hold spironolactone due to BUD    HE:  No overt s/sx at this time    Screening for esophageal varices:  No evidence of varices on EGD from December 2024  - He will eventually need repeat EGD due to new hepatic decompensation    HCC screening  No evidence of hepatoma on RUQ ultrasound 4/10

## 2025-04-20 NOTE — PROGRESS NOTES
Progress Note - Hospitalist   Name: Sudhir Bach 55 y.o. male I MRN: [id number]  Unit/Bed#: -01 I Date of Admission: 4/19/2025   Date of Service: 4/20/2025 I Hospital Day: 1    Assessment & Plan  Other cirrhosis of liver (HCC)  Patient was past medical history of transaminitis  Patient presents with generalized weakness and lethargy  Patient follow-up with gastroenterology  Was concern for cirrhosis probably due to [place] due to new onset ascites and abnormal liver function test as patient denied any alcohol intake  Patient was seen recently by gastroenterology and autoimmune, viral serology, ceruloplasmin, iron panel were ordered  Chronic viral serology are nonreactive  Ceruloplasmin is 9.2  Patient recent labs showed worsening kidney function with creatinine of 2.73, AST 61, sodium 128, T. bili 3.77, INR 1.4  Patient was referred to the ED for extensive workup  Patient presented with weakness  GI recommended starting low-dose of spironolactone  Plan  Consult gastroenterology appreciate recommendations  Will hold spironolactone in the setting of BUD  Trend MELD labs  GI planning for liver biopsy       BUD (acute kidney injury) (HCC)  Patient with past medical history of CKD  Patient follows with [facility] nephrology  Patient current creatinine is 2.73 and baseline was 1.7  Patient was started recently on spironolactone  Plan  Hold spironolactone  Avoid nephrotoxic agent   Nephrology following  Pancreatic cyst  Patient with history of idiopathic pancreatitis  Patient presented with mild abdominal pain  Patient with elevated T. bili and jaundice on exam  CT abdomen pelvis without contrast 6.5 cm cystic mass in the pancreatic body/tail, indeterminate. Additional 11 mm pancreatic head cyst.   MRCP pending  SIRS (systemic inflammatory response syndrome) (HCC)  Patient met SIRS criteria through leukocytosis and tachypnea  She denied any fever, burning urination  UA showed 2-4 white cell count  No signs of  infection  Will continue to monitor off antibiotic  Follow-up on blood cultures  Weight loss  Patient presented with weight loss  Patient reported loss of 19 pounds in the last 2 weeks  Patient also complained of weakness, lethargy  Patient had recent EGD and colonoscopy were unremarkable  In the setting of elevated liver enzymes and BUD    Mri abdomen pending  History of CVA (cerebrovascular accident)  Patient with past medical history of stroke with no residual deficits  Currently on statin  Will hold statin in the setting of elevated liver enzymes  Mixed hyperlipidemia  On statin  Will hold statin due to elevated liver enzymes  Anemia  Patient has history of anemia  Current hemoglobin 7.7  Iron is 43 and ferritin 1365  Probably due to anemia of chronic disease plus iron deficiency  Follow-up on nephrology recommendations  Maintain hemoglobin above 7 and transfuse as needed  Status post 1 PRBC transfusion  Monitor hemoglobin q8   CKD (chronic kidney disease) stage 4, GFR 15-29 ml/min ([facility])  Lab Results   Component Value Date    EGFR 28 04/20/2025    EGFR 25 04/19/2025    EGFR 25 04/16/2025    CREATININE 2.47 (H) 04/20/2025    CREATININE 2.73 (H) 04/19/2025    CREATININE 2.72 (H) 04/16/2025   Avoid nephrotoxic agents  Follow-up on creatinine  Urology following, appreciate recommendations  Urinary retention protocol  Continue to hold spironolactone  Vitamin D deficiency  On vitamin D once weekly    Idiopathic pancreatitis  Patient with past medical history of idiopathic pancreatitis    CT abdomen/pelvis-  6.5 cm cystic mass in the pancreatic body/tail, indeterminate. Additional 11 mm pancreatic head cyst.     Lipase-normal  MRI/MRCP abdomen ordered and pending    Hyponatremia  Mental status is baseline  IV fluids as per nephrology  Serial BMPs  Hyponatremia work    VTE Pharmacologic Prophylaxis:   Moderate Risk (Score 3-4) - Pharmacological DVT Prophylaxis Ordered: heparin.    Mobility:   Basic Mobility Inpatient  Raw Score: 19  JH-HLM Goal: 6: Walk 10 steps or more  JH-HLM Achieved: 5: Stand (1 or more minutes)  JH-HLM Goal achieved. Continue to encourage appropriate mobility.    Patient Centered Rounds: I performed bedside rounds with nursing staff today.   Discussions with Specialists or Other Care Team Provider: yes    Education and Discussions with Family / Patient: Updated  (friend) via phone.    Current Length of Stay: 1 day(s)  Current Patient Status: Inpatient   Certification Statement: The patient will continue to require additional inpatient hospital stay due to pending stabilization and evaluation  Discharge Plan:  Until stabilized    Code Status: Level 1 - Full Code    Subjective   Seen and examined at bedside  Awake and alert  Comfortable  Endorses generalized weakness  Denies any abdominal pain or chest pain    Objective :  Temp:  [97.3 °F (36.3 °C)-98.5 °F (36.9 °C)] 97.7 °F (36.5 °C)  HR:  [72-91] 89  BP: ()/(43-56) 94/55  Resp:  [18-20] 18  SpO2:  [94 %-100 %] 99 %  O2 Device: None (Room air)    Body mass index is 23.04 kg/m².     Input and Output Summary (last 24 hours):     Intake/Output Summary (Last 24 hours) at 4/20/2025 1508  Last data filed at 4/20/2025 1359  Gross per 24 hour   Intake 1126.58 ml   Output 100 ml   Net 1026.58 ml       Physical Exam  Vitals reviewed.   Constitutional:       Appearance: He is ill-appearing.   HENT:      Head: Atraumatic.      Mouth/Throat:      Mouth: Mucous membranes are dry.   Cardiovascular:      Rate and Rhythm: Normal rate.      Heart sounds: Normal heart sounds.   Pulmonary:      Effort: No respiratory distress.   Abdominal:      General: Bowel sounds are normal. There is distension.      Tenderness: There is no abdominal tenderness. There is no guarding.   Musculoskeletal:      Right lower leg: No edema.      Left lower leg: No edema.   Skin:     General: Skin is dry.      Findings: Bruising and erythema present.   Neurological:      Mental  Status: He is alert and oriented to person, place, and time.      Motor: Weakness present.           Lines/Drains:              Lab Results: I have reviewed the following results:   Results from last 7 days   Lab Units 04/20/25  0527   WBC Thousand/uL 17.75*   HEMOGLOBIN g/dL 6.8*   HEMATOCRIT % 20.3*   PLATELETS Thousands/uL 64*   SEGS PCT % 70   LYMPHO PCT % 19   MONO PCT % 9   EOS PCT % 1     Results from last 7 days   Lab Units 04/20/25  0527   SODIUM mmol/L 131*   POTASSIUM mmol/L 4.0   CHLORIDE mmol/L 105   CO2 mmol/L 19*   BUN mg/dL 16   CREATININE mg/dL 2.47*   ANION GAP mmol/L 7   CALCIUM mg/dL 9.4   ALBUMIN g/dL 1.9*   TOTAL BILIRUBIN mg/dL 2.96*   ALK PHOS U/L 94   ALT U/L 32   AST U/L 43*   GLUCOSE RANDOM mg/dL 116     Results from last 7 days   Lab Units 04/20/25  0527   INR  1.57*                   Recent Cultures (last 7 days):   Results from last 7 days   Lab Units 04/19/25  2350   BLOOD CULTURE  Received in Microbiology Lab. Culture in Progress.  Received in Microbiology Lab. Culture in Progress.       Imaging Results Review: I reviewed radiology reports from this admission including: CT abdomen/pelvis.  Other Study Results Review: No additional pertinent studies reviewed.    Last 24 Hours Medication List:     Current Facility-Administered Medications:     escitalopram (LEXAPRO) tablet 10 mg, Daily    Gadobutrol injection (SINGLE-DOSE) SOLN 6 mL, Once in imaging    heparin (porcine) subcutaneous injection 5,000 Units, Q8H PAULY    multi-electrolyte (Plasmalyte-A/Isolyte-S PH 7.4/Normosol-R) IV bolus 1,000 mL, Once, Last Rate: 0 mL (04/19/25 1833)    multi-electrolyte (Plasmalyte-A/Isolyte-S PH 7.4/Normosol-R) IV solution, Continuous, Last Rate: Stopped (04/20/25 1405)    pantoprazole (PROTONIX) EC tablet 40 mg, Daily    [Held by provider] spironolactone (ALDACTONE) tablet 50 mg, Daily    Administrative Statements   Today, Patient Was Seen By: Radha Srivastava MD  I have spent a total time of 39 minutes in  caring for this patient on the day of the visit/encounter including Counseling / Coordination of care, Documenting in the medical record, Reviewing/placing orders in the medical record (including tests, medications, and/or procedures), Obtaining or reviewing history  , and Communicating with other healthcare professionals .    **Please Note: This note may have been constructed using a voice recognition system.**

## 2025-04-20 NOTE — ASSESSMENT & PLAN NOTE
External vitamin D 112  Per [facility], patient on Cholecalciferol 50,000 weekly.  Monitor levels

## 2025-04-20 NOTE — CONSULTS
Consultation - Gastroenterology   Name: Sudhir Bach 55 y.o. male I MRN: [id number]  Unit/Bed#: -01 I Date of Admission: 4/19/2025   Date of Service: 4/20/2025 I Hospital Day: 1   Inpatient consult to gastroenterology  Consult performed by: Jackie Matthews PA-C  Consult ordered by: Sudhir Mcdonald DO        Physician Requesting Evaluation: Radha Srivastava MD   Reason for Evaluation / Principal Problem: Generalized weakness, cirrhosis    Assessment & Plan  Other cirrhosis of liver (HCC)  Newly diagnosed. There is evidence of cirrhosis based on labs, imaging, and physical exam. Bilirubin 2.96. Albumin 1.9. INR 1.57. Platelets 64,000. RUQ US 4/10/2025 shows cirrhosis with severe steatosis, hepatomegaly, sequela of portal HTN including moderate ascites. CT A/P 4/19 again shows evidence of cirrhosis, moderate to large ascites. No significant alcohol use in the past. Extensive liver serologic workup significant for low ceruloplasmin suspicious for Roque's disease and elevated ferritin 1365, concern for hemochromatosis. Autoimmune and viral serologies negative. MELD 3.0 = 29 on 4/20.    - Monitor MELD labs daily  - Check HFE gene mutation  - We will discuss case with our hepatologist tomorrow, patient may need liver biopsy given uncertainty regarding the etiology for his cirrhosis  - Recommend close outpatient GI follow-up  - Due to low ceruloplasmin, he will eventually need a workup for Roque's disease including 24-hour urine copper.    Ascites:  -Recommend diagnostic and therapeutic paracentesis to rule out SBP and confirm ascites is secondary to cirrhosis/portal HTN  -Recommend low sodium diet  -Hold spironolactone due to BUD    HE:  No overt s/sx at this time    Screening for esophageal varices:  No evidence of varices on EGD from December 2024  - He will eventually need repeat EGD due to new hepatic decompensation    HCC screening  No evidence of hepatoma on RUQ ultrasound 4/10  BUD (acute kidney injury)  (HCC)  BUD on CKD, current creatinine 2.73 compared to baseline 1.7. Patient recently initiated on spironolactone.    - Agree with holding spironolactone  - Appreciate nephrology recommendations  Anemia  Hemoglobin 7.7 on admission, down from 12.5 in September 2023. MCV high 114. Vitamin B12 greater than 2000. No recent folate level. Iron 43 and ferritin 1365. Recent EGD and colonoscopy from December 2024 significant for gastritis and mild Schatzki's ring, colon polyps, diverticulosis, hemorrhoids. No current signs of overt GI bleeding.    - Monitor H&H and transfuse to keep hemoglobin greater than 7  - Check folate level  - Monitor for GI bleeding  - Patient will need repeat EGD to screen for esophageal varices given new decompensation  Pancreatic cyst  Patient with history of idiopathic pancreatitis. No evidence of pancreatitis at present time. CT shows apparent cystic mass in the pancreatic body/tail measuring approximately 6.5 x 4.3 cm and another 11 mm cyst along the medial pancreatic head. Suspect cysts may be pseudocysts related to prior episode of pancreatitis.    - Follow-up results of MRI/MRCP for further characterization of the cysts  - He may require EUS for sampling of the cysts depending on MRI/MRCP results    I have discussed the above management plan in detail with the primary service.     History of Present Illness   HPI:  Sudhir Bach is a 55 y.o. male with history of CVA, hyperlipidemia, CKD stage IV, history of idiopathic pancreatitis who presents to the ER with complaints of feeling weak, fatigued, abdominal pain.    Patient was recently seen in our GI office on 4/11/2025 for evaluation of abnormal liver tests and abdominal distention. He reports increasing abdominal distention and pressure over the past few weeks. He reports fatigue, weakness, reduced appetite due to fullness. No nausea or vomiting. No diarrhea or bloody or black stools. No fevers or chills. No confusion. He has been sleeping  during the daytime, which is new.    He does have history of intermittent LFT elevations dating back to 2023. Abdominal ultrasound at that time was notable for mild hepatomegaly with severe diffuse fatty infiltration.    He was called last week regarding abnormal test results showing worsening bilirubin, hyponatremia, BUD on CKD.Ceruloplasmin is low and ferritin is high. He was recommended to go to the ER for expedited workup including paracentesis and to be evaluated by nephrology.     Patient reports drinking alcohol in the past, up to 2-3 drinks daily but he drinks sparingly now only at very special occasions.      Review of Systems   Constitutional:  Positive for fatigue. Negative for chills and fever.   HENT:  Negative for ear pain and sore throat.    Eyes:  Negative for pain and visual disturbance.        Scleral icterus   Respiratory:  Negative for cough and shortness of breath.    Cardiovascular:  Negative for chest pain and palpitations.   Gastrointestinal:  Positive for abdominal distention. Negative for abdominal pain and vomiting.   Genitourinary:  Negative for dysuria and hematuria.   Musculoskeletal:  Negative for arthralgias and back pain.   Skin:  Positive for color change. Negative for rash.        Jaundice   Neurological:  Positive for weakness. Negative for seizures and syncope.   All other systems reviewed and are negative.    Medical History Review: I have reviewed the patient's PMH, PSH, Social History, Family History, Meds, and Allergies     Objective :  Temp:  [97.3 °F (36.3 °C)-98.5 °F (36.9 °C)] 97.3 °F (36.3 °C)  HR:  [] 91  BP: ()/(49-60) 96/56  Resp:  [16-20] 18  SpO2:  [94 %-100 %] 97 %  O2 Device: None (Room air)    Physical Exam  Vitals and nursing note reviewed.   Constitutional:       General: He is not in acute distress.     Appearance: He is well-developed.   HENT:      Head: Normocephalic and atraumatic.   Eyes:      General: Scleral icterus present.       "Conjunctiva/sclera: Conjunctivae normal.   Cardiovascular:      Rate and Rhythm: Normal rate and regular rhythm.      Heart sounds: No murmur heard.  Pulmonary:      Effort: Pulmonary effort is normal. No respiratory distress.      Breath sounds: Normal breath sounds.   Abdominal:      General: Bowel sounds are normal. There is distension.      Palpations: Abdomen is soft.      Tenderness: There is no abdominal tenderness.   Musculoskeletal:         General: No swelling.      Cervical back: Neck supple.   Skin:     General: Skin is warm and dry.      Capillary Refill: Capillary refill takes less than 2 seconds.      Coloration: Skin is jaundiced and pale.   Neurological:      Mental Status: He is alert and oriented to person, place, and time.      Comments: No asterixis   Psychiatric:         Mood and Affect: Mood normal.           Lab Results: I have reviewed the following results:CBC/BMP:   .     04/20/25  0527   WBC 17.75*   HGB 6.8*   HCT 20.3*   PLT 64*   SODIUM 131*   K 4.0      CO2 19*   BUN 16   CREATININE 2.47*   GLUC 116   MG 2.0   PHOS 2.9    , Creatinine Clearance: Estimated Creatinine Clearance: 32.7 mL/min (A) (by C-G formula based on SCr of 2.47 mg/dL (H))., LFTs:   .     04/20/25  0527   AST 43*   ALT 32   ALB 1.9*   TBILI 2.96*   ALKPHOS 94    , PTT/INR:  .     04/19/25  1203 04/20/25  0527   PTT 47*  --    INR 1.60* 1.57*    , Troponin,BNP:  .     04/19/25  1038 04/19/25  1228   HSTNI0 6  --    HSTNI2  --  5    , Lactic Acid: No new results in last 24 hours. , Procalcitonin: No results found for: \"PROCALCITONI\", ABG: No new results in last 24 hours.     Imaging Results Review: I reviewed radiology reports from this admission including: CT abdomen/pelvis.  Other Study Results Review: No additional pertinent studies reviewed.      "

## 2025-04-20 NOTE — UTILIZATION REVIEW
"Initial Clinical Review    Admission: Date/Time/Statement:   Admission Orders (From admission, onward)       Ordered        04/19/25 1554  INPATIENT ADMISSION  Once                          Orders Placed This Encounter   Procedures    INPATIENT ADMISSION     Standing Status:   Standing     Number of Occurrences:   1     Level of Care:   Med Surg [16]     Estimated length of stay:   More than 2 Midnights     Certification:   I certify that inpatient services are medically necessary for this patient for a duration of greater than two midnights. See H&P and MD Progress Notes for additional information about the patient's course of treatment.     ED Arrival Information       Expected   -    Arrival   4/19/2025 10:22    Acuity   Urgent              Means of arrival   Walk-In    Escorted by   Self    Service   Hospitalist    Admission type   Emergency              Arrival complaint   weakness             Chief Complaint   Patient presents with    Fatigue     Pt arrives and states \"I feel weak and fatigue for the last couple weeks\" Pt reports abd pain. Denies any nausea, vomiting, fevers.        Initial Presentation: 55 y.o. male to ED via walk-in from home  Present to ED with generalized weakness and lethargy. Patient also reported weight loss of 19 pounds in the last 2 weeks. Patient was seen by gastroenterology who ordered extensive testing and was advised to come to the ED due to elevated bilirubin.   PMHX transaminitis, idiopathic pancreatitis, anxiety, vitamin D deficiency, chronic renal failure   Admitted to [facility] with DX: Other cirrhosis of liver   on exam: tachypnea; hypotensive; Scleral icterus; abdominal tenderness; sodium 128, creatinine 2.73 (baseline 1.7), AST 61, ALT 39, platelet 88, white cell count 22, hemoglobin 7.7, ammonia 28. Iron is 43 and ferritin 1365. UA showed 2-4 white cell count   CT abdomen pelvis showed 6.5 cm cystic mass in the pancreatic body/tail, indeterminate. Additional 11 mm pancreatic " head cyst. Further characterization with contrast-enhanced MRI/MRCP recommended. Heterogeneous liver with findings suggestive of cirrhosis and fatty infiltration. Moderate to large quantity of abdominopelvic ascites. Cholelithiasis. 4 mm left UPJ calculus without obstruction. Additional 5 mm nonobstructing left renal calculus. Colonic diverticulosis without diverticulitis.   PLAN: recd ivf bolus 1L x1; fluid restriction; f/u orthostatics; monitor labs; GI consult - GI recommended starting low-dose of spironolactone; Nephrology consult; f/u blood / urine cx      Anticipated Length of Stay/Certification Statement: Patient will be admitted on an inpatient basis with an anticipated length of stay of greater than 2 midnights secondary to generalized weakness.       Date: 4/20/25      Day 2   Current hemoglobin 6.8   Plan: 1 unit leukocyte reduced PRBC; fluid restriction; f/u orthostatics; monitor labs      NEPHROLOGY CONSULT  CKD (chronic kidney disease) stage 4, GFR 15-29 ml/min: Etiology:  Patient follows outpatient with [facility] nephrology. Nancy Yeung. Patient has past history of severe BUD requiring renal replacement therapy in 2018 per care everywhere. Patient then placed on dialysis for about 3 months then recovered from 11/2018-02/2019. Patient was then encouraged and referred to Dr. Nini Shah at [place] due to symptoms of lethargy, loss of balance confusion and feeling nauseated.  Symptoms improved and patient was not seen by Dr. Shah. Current creatinine 2.47, with admission creatinine 2.73. Recent A1c 4/9/2025 - 5.6  BUD (acute kidney injury): Etiology: multifactorial: d/t poor oral intake + hypovolemia/dehydration + ; Patient has past history of severe BUD requiring renal replacement therapy in 2018 per care everywhere. Current creatinine 2.47, admission creatinine 2.73. Baseline creatinine 1.8-2.0 since 2024.  Prior to that 3.0-4.0 since 2023.  UA: Trace blood and leukocytes, +1 protein, moderate epithelial cells, 20-30 hyaline casts, 3-5 granular casts, innumerable bacteria and mucus threads. Urine electrolytes: Urine chloride 88, urine creatinine 129, urine sodium 69, urine urea 367. FENa: 1.0% -indeterminate.  Can be seen with either prerenal or intrinsic BUD. FEUrea: 43.9% - >35% suggestive of intrinsic BUD. Received Plasma-Lyte IV fluid bolus 1 L in ER. No recent IV contrast with imaging. PCXR: Negative. 4/19/2025 CT A/P wo: KIDNEYS/URETERS: 5 mm nonobstructing calculus left mid kidney. 4 mm calculus left UPJ, without obstruction. Per [employer] [facility] meds, not on ACE or ARB. Spironolactone 50 mg oral daily currently on hold  Plans: Would recommend gentle IV fluids as showing improvement in creatinine as well as sodium. Recommend avoiding all NSAIDs, nephrotoxic agents, as well as IV contrast. Avoid hypotension and any perturbations in blood pressure. Closely monitor I&O, daily weight and labs. Serum Osmo, uric acid and a.m. cortisol placed for completion of workup. Continue holding diuretic. Avoid over correcting to avoid ODS, preferably 4-6pts in 24 hours. No greater than 133 sodium by 10pm tonight      GI CONSULT   he etiology of his liver disease is the primary focus right now. Hemochromatosis a consideration Roque's disease is possible but quite rare. Also the fact that he is 55 without psychiatric symptoms makes it less likely. Patient says he has a history of steatohepatitis and does have fatty liver on ultrasound but does not really have the metabolic syndrome. Other testing still pending. Will discuss with hepatology tomorrow the next step forward. He may need a liver biopsy.   Other cirrhosis of liver: Newly diagnosed. There is evidence of cirrhosis based on labs, imaging, and physical exam. Bilirubin 2.96. Albumin 1.9. INR 1.57. Platelets 64,000. RUQ US 4/10/2025 shows cirrhosis with severe steatosis, hepatomegaly, sequela of portal HTN including  moderate ascites. CT A/P 4/19 again shows evidence of cirrhosis, moderate to large ascites. No significant alcohol use in the past. Extensive liver serologic workup significant for low ceruloplasmin suspicious for Roque's disease and elevated ferritin 1365, concern for hemochromatosis. Autoimmune and viral serologies negative. MELD 3.0 = 29 on 4/20.   Anemia: Hemoglobin 7.7 on admission, down from 12.5 in September 2023. MCV high 114. Vitamin B12 greater than 2000. No recent folate level. Iron 43 and ferritin 1365. Recent EGD and colonoscopy from December 2024 significant for gastritis and mild Schatzki's ring, colon polyps, diverticulosis, hemorrhoids. No current signs of overt GI bleeding.  Pancreatic cyst: Patient with history of idiopathic pancreatitis. No evidence of pancreatitis at present time. CT shows apparent cystic mass in the pancreatic body/tail measuring approximately 6.5 x 4.3 cm and another 11 mm cyst along the medial pancreatic head. Suspect cysts may be pseudocysts related to prior episode of pancreatitis.     Plan: Monitor MELD labs daily. Check HFE gene mutation. We will discuss case with our hepatologist tomorrow. Due to low ceruloplasmin, he will eventually need a workup for Roque's disease including 24-hour urine copper. Recommend diagnostic and therapeutic paracentesis to rule out SBP and confirm ascites is secondary to cirrhosis/portal HTN. Recommend low sodium diet. Hold spironolactone due to BUD; Monitor H&H and transfuse to keep hemoglobin greater than 7. Check folate level.  Monitor for GI bleeding. Patient will need repeat EGD to screen for esophageal varices given new decompensation; Follow-up results of MRI/MRCP for further characterization of the cysts    ____________________________________________________________________________________________    Scheduled Medications:  ALPRAZolam, 0.5 mg, Oral, Once  escitalopram, 10 mg, Oral, Daily  heparin (porcine), 5,000 Units,  Subcutaneous, Q8H PAULY  multi-electrolyte, 1,000 mL, Intravenous, Once (recd 4/19)   pantoprazole, 40 mg, Oral, Daily  [Held by provider] spironolactone, 50 mg, Oral, Daily      Continuous IV Infusions:  multi-electrolyte, 50 mL/hr, Intravenous, Continuous      PRN Meds: None       ED Triage Vitals   Temperature Pulse Respirations Blood Pressure SpO2 Pain Score   04/19/25 1035 04/19/25 1033 04/19/25 1033 04/19/25 1033 04/19/25 1033 04/19/25 1033   (!) 97.4 °F (36.3 °C) 91 20 120/54 100 % 6     Weight (last 2 days)       Date/Time Weight    04/19/25 1600 68.7 (151.5)    04/19/25 1033 73.9 (163)            Vital Signs (last 3 days)       Date/Time Temp Pulse Resp BP MAP (mmHg) SpO2 O2 Device Patient Position - Orthostatic VS [place] Coma Scale Score Pain    04/20/25 13:55:05 97.7 °F (36.5 °C) 89 18 94/55 68 99 % None (Room air) -- -- --    04/20/25 13:16:12 98.2 °F (36.8 °C) 79 -- 96/50 65 98 % -- -- -- --    04/20/25 1316 -- -- 18 -- -- -- -- -- -- --    04/20/25 12:13:31 98.4 °F (36.9 °C) 84 18 98/52 67 95 % -- -- -- --    04/20/25 11:44:32 98.2 °F (36.8 °C) 81 18 90/43 59 96 % -- -- -- --    04/20/25 1129 -- -- 18 -- -- -- -- -- -- --    04/20/25 11:28:22 97.4 °F (36.3 °C) 79 18 90/45 60 97 % -- -- -- --    04/20/25 11:23:23 98.3 °F (36.8 °C) 81 18 91/45 60 94 % -- -- -- --    04/20/25 1123 -- -- 18 -- -- -- -- -- -- --    04/20/25 11:18:29 98.2 °F (36.8 °C) 80 18 91/45 60 94 % -- -- -- --    04/20/25 1118 -- -- 18 -- -- -- -- -- -- --    04/20/25 1113 -- -- 18 -- -- -- -- -- -- --    04/20/25 1108 -- -- 18 -- -- -- -- -- -- --    04/20/25 10:43:33 98.3 °F (36.8 °C) 78 -- 91/46 61 94 % -- -- -- --    04/20/25 07:20:23 -- 91 -- 96/56 69 97 % -- Standing - Orthostatic VS -- --    04/20/25 07:15:31 97.3 °F (36.3 °C) 90 -- 99/56 70 100 % None (Room air) -- 15 No Pain    04/20/25 0715 -- -- -- -- -- -- -- Sitting - Orthostatic VS -- --    04/20/25 07:12:43 97.3 °F (36.3 °C) 79 18 97/50 66 98 % -- Lying - Orthostatic VS  -- --    04/20/25 07:09:22 -- 79 -- 97/50 66 97 % -- -- -- --    04/20/25 05:29:53 98.5 °F (36.9 °C) 77 18 97/50 66 97 % None (Room air) Lying -- --    04/19/25 2319 -- -- -- -- -- -- -- -- 15 No Pain    04/19/25 19:49:55 98.2 °F (36.8 °C) 82 18 99/50 66 95 % -- -- -- --    04/19/25 17:33:42 97.3 °F (36.3 °C) 72 -- -- -- 94 % None (Room air) -- 15 --    04/19/25 16:56:15 -- 76 18 103/49 67 99 % -- Lying -- No Pain    04/19/25 1600 -- 82 20 90/50 60 96 % -- -- -- --    04/19/25 1500 -- 79 17 101/59 77 100 % -- -- -- --    04/19/25 1400 -- 75 18 98/58 -- 97 % -- -- -- --    04/19/25 1300 -- 78 18 102/60 -- 96 % -- -- -- --    04/19/25 1200 -- 79 18 97/54 74 95 % -- -- -- --    04/19/25 1100 -- 82 18 98/54 73 98 % -- -- -- --    04/19/25 1056 -- 100 16 96/53 -- -- -- Standing - Orthostatic VS -- --    04/19/25 1055 -- -- -- -- -- -- -- -- 15 6    04/19/25 1054 -- 97 16 101/59 -- -- -- Sitting - Orthostatic VS -- --    04/19/25 1052 -- 97 16 90/53 -- -- -- Lying - Orthostatic VS -- --    04/19/25 1035 97.4 °F (36.3 °C) -- -- -- -- -- -- -- -- --    04/19/25 1033 -- 91 20 120/54 -- 100 % None (Room air) Sitting -- 6              Pertinent Labs/Diagnostic Test Results:   Radiology:  CT abdomen pelvis wo contrast   Final Interpretation by Arun Leach DO (04/19 1502)   Addendum (preliminary) 1 of 1 by Arun Leach DO (04/19 1502)   ADDENDUM:      Findings were communicated with Dr. CYNDI MARAVILLA on 4/19/2025 at 3:01    PM via HIPPA compliant electronic text messaging. Apparently the initial    report was not visible in PACS.      Final      1.  6.5 cm cystic mass in the pancreatic body/tail, indeterminate. Additional 11 mm pancreatic head cyst. Further characterization with contrast-enhanced MRI/MRCP recommended.      2.  Heterogeneous liver with findings suggestive of cirrhosis and fatty infiltration.      3.  Moderate to large quantity of abdominopelvic ascites.      4.  Cholelithiasis.      5.  4 mm  left UPJ calculus without obstruction. Additional 5 mm nonobstructing left renal calculus.      6.  Colonic diverticulosis without diverticulitis.      7.  Limited study without IV or oral contrast.      The study was marked in EPIC for immediate notification and follow-up.      Workstation performed: [id number]         XR chest 1 view portable   Final Interpretation by Torito Villalpando MD (04/19 1342)      No acute cardiopulmonary disease.            Resident: Filipe Valenzuela I, the attending radiologist, have reviewed the images and agree with the final report above.      Workstation performed: [id number]         IR INPATIENT Paracentesis    (Results Pending)   MRI abdomen w wo contrast and mrcp    (Results Pending)     Cardiology:  No orders to display     Results from last 7 days   Lab Units 04/19/25  1038   SARS-COV-2  Negative     Results from last 7 days   Lab Units 04/20/25 0527 04/19/25  1038   WBC Thousand/uL 17.75* 22.88*   HEMOGLOBIN g/dL 6.8* 7.7*   HEMATOCRIT % 20.3* 23.6*   PLATELETS Thousands/uL 64* 88*   TOTAL NEUT ABS Thousands/µL 12.61* 18.44*        Results from last 7 days   Lab Units 04/20/25  0527 04/19/25  1038 04/16/25  1215   SODIUM mmol/L 131* 128* 128*   POTASSIUM mmol/L 4.0 4.3 4.5   CHLORIDE mmol/L 105 103 102   CO2 mmol/L 19* 17* 17*   ANION GAP mmol/L 7 8 9   BUN mg/dL 16 16 17   CREATININE mg/dL 2.47* 2.73* 2.72*   EGFR ml/min/1.73sq m 28 25 25   CALCIUM mg/dL 9.4 9.8 9.6   MAGNESIUM mg/dL 2.0 1.9  --    PHOSPHORUS mg/dL 2.9  --   --      Results from last 7 days   Lab Units 04/20/25  0527 04/19/25  1049 04/19/25  1038 04/16/25  1215   AST U/L 43*  --  61* 54*   ALT U/L 32  --  39 37   ALK PHOS U/L 94  --  111* 131*   TOTAL PROTEIN g/dL 6.8  --  7.9 7.5   ALBUMIN g/dL 1.9*  --  2.2* 2.2*   TOTAL BILIRUBIN mg/dL 2.96*  --  3.77* 3.21*   AMMONIA umol/L  --  28  --   --         Results from last 7 days   Lab Units 04/20/25  0527 04/19/25  1038   GLUCOSE RANDOM mg/dL 116 150*         Results from last 7 days   Lab Units 04/19/25  1228 04/19/25  1038   HS TNI 0HR ng/L  --  6   HS TNI 2HR ng/L 5  --    HSTNI D2 ng/L -1  --         Results from last 7 days   Lab Units 04/20/25  0527 04/19/25  1203   PROTIME seconds 19.2* 19.5*   INR  1.57* 1.60*   PTT seconds  --  47*     Results from last 7 days   Lab Units 04/19/25  1038   TSH 3RD GENERATON uIU/mL 3.388        Results from last 7 days   Lab Units 04/16/25  1215   FERRITIN ng/mL 1,365*   IRON ug/dL 43*     Results from last 7 days   Lab Units 04/16/25  1215   TRANSFERRIN mg/dL <75*     Results from last 7 days   Lab Units 04/20/25  1051   UNIT PRODUCT CODE  [id number]   UNIT NUMBER  [id number]   UNITABO  O   UNITRH  POS   CROSSMATCH  Compatible   UNIT DISPENSE STATUS  Issued   UNIT PRODUCT VOL ml 350     Results from last 7 days   Lab Units 04/16/25  1215   HEP B S AG  Non-reactive   HEP C AB  Non-reactive   HEP B C TOTAL AB  Non-reactive     Results from last 7 days   Lab Units 04/19/25  1038   LIPASE u/L 29        Results from last 7 days   Lab Units 04/19/25  1446   OSMO UR mmol/     Results from last 7 days   Lab Units 04/19/25  1446   CLARITY UA  Clear   COLOR UA  Yellow   SPEC GRAV UA  1.025   PH UA  5.5   GLUCOSE UA mg/dl Negative   KETONES UA mg/dl Negative   BLOOD UA  Trace*   PROTEIN UA mg/dl 30 (1+)*   NITRITE UA  Negative   BILIRUBIN UA  Negative   UROBILINOGEN UA (BE) mg/dl <2.0   LEUKOCYTES UA  Trace*   WBC UA /hpf 2-4   RBC UA /hpf 1-2   BACTERIA UA /hpf Innumerable*   EPITHELIAL CELLS WET PREP /hpf Moderate*   MUCUS THREADS  Innumerable*   SODIUM UR mmol/L 69.0   CREATININE UR mg/dL 129.0     Results from last 7 days   Lab Units 04/19/25  1038   INFLUENZA A PCR  Negative   INFLUENZA B PCR  Negative   RSV PCR  Negative        Results from last 7 days   Lab Units 04/19/25  2350   BLOOD CULTURE  Received in Microbiology Lab. Culture in Progress.  Received in Microbiology Lab. Culture in Progress.          Past Medical  History:   Diagnosis Date    Anxiety     Chronic kidney disease     Colon polyp     Hyperlipidemia     Hypertension      Present on Admission:   CKD (chronic kidney disease) stage 4, GFR 15-29 ml/min (HCC)   Mixed hyperlipidemia   Vitamin D deficiency   Idiopathic pancreatitis      Admitting Diagnosis: Dehydration [E86.0]  Kidney stone [N20.0]  Cirrhosis (HCC) [K74.60]  Hyponatremia [E87.1]  Weakness [R53.1]  Anemia [D64.9]  Chronic renal failure [N18.9]  Pancreatic mass [K86.89]  Generalized weakness [R53.1]  Abdominal ascites [R18.8]  Age/Sex: 55 y.o. male    Network Utilization Review Department  ATTENTION: Please call with any questions or concerns to [phone] and carefully listen to the prompts so that you are directed to the right person. All voicemails are confidential.   For Discharge needs, contact Care Management DC Support Team at [phone] opt. 2  Send all requests for admission clinical reviews, approved or denied determinations and any other requests to dedicated fax number below belonging to the campus where the patient is receiving treatment. List of dedicated fax numbers for the Facilities:  FACILITY NAME UR FAX NUMBER   ADMISSION DENIALS (Administrative/Medical Necessity) [phone]   DISCHARGE SUPPORT TEAM (NETWORK) [phone]   PARENT CHILD HEALTH (Maternity/NICU/Pediatrics) [phone]   [facility] [phone]   [facility] [phone]   [facility] [phone]   [facility] [phone]   [facility] [phone]   [facility] [phone]   [facility] [phone]   [facility] [phone]   [facility] [phone]   [facility] [phone]   [facility] [phone]   [facility]  [facility] [phone]

## 2025-04-20 NOTE — ASSESSMENT & PLAN NOTE
Patient has history of anemia  Current hemoglobin 7.7  Iron is 43 and ferritin 1365  Probably due to anemia of chronic disease plus iron deficiency  Follow-up on nephrology recommendations  Maintain hemoglobin above 7 and transfuse as needed  Status post 1 PRBC transfusion  Monitor hemoglobin q8

## 2025-04-20 NOTE — ASSESSMENT & PLAN NOTE
Patient with history of idiopathic pancreatitis  Patient presented with mild abdominal pain  Patient with elevated T. bili and jaundice on exam  CT abdomen pelvis without contrast 6.5 cm cystic mass in the pancreatic body/tail, indeterminate. Additional 11 mm pancreatic head cyst.   MRCP pending

## 2025-04-20 NOTE — CONSULTS
NEPHROLOGY HOSPITAL CONSULTATION   Sudhir Bach 55 y.o. male MRN: [id number]  Unit/Bed#: -01 Encounter: [id number]    Brief History of Admission -55-year-old male came to [facility] ER for complaints of fatigue.  Patient states he has been feeling weak and fatigued for the past couple weeks with complaints of abdominal pain.  Nephrology consulted for CKD    Assessment & Plan  CKD (chronic kidney disease) stage 4, GFR 15-29 ml/min ([facility])  Etiology:   Patient follows outpatient with [facility] nephrology. Nancy Finderne  Patient has past history of severe BUD requiring renal replacement therapy in 2018 per care everywhere. Patient then placed on dialysis for about 3 months then recovered from 11/2018-02/2019  Patient was then encouraged and referred to Dr. Nini Shah at [place] [facility] due to symptoms of lethargy, loss of balance confusion and feeling nauseated.  Symptoms improved and patient was not seen by Dr. Shah.   Current creatinine 2.47, with admission creatinine 2.73  Recent A1c 4/9/2025 - 5.6  BUD (acute kidney injury) ([facility])  Etiology: multifactorial: d/t poor oral intake + hypovolemia/dehydration +   Patient has past history of severe BUD requiring renal replacement therapy in 2018 per care everywhere.  Current creatinine 2.47, admission creatinine 2.73  Baseline creatinine 1.8-2.0 since 2024.  Prior to that 3.0-4.0 since 2023  UA: Trace blood and leukocytes, +1 protein, moderate epithelial cells, 20-30 hyaline casts, 3-5 granular casts, innumerable bacteria and mucus threads  Urine electrolytes: Urine chloride 88, urine creatinine 129, urine sodium 69, urine urea 367   FENa: 1.0% -indeterminate.  Can be seen with either prerenal or intrinsic BUD  FEUrea: 43.9% - >35% suggestive of intrinsic BUD  Received Plasma-Lyte IV fluid bolus 1 L in ER  No recent IV contrast with imaging  PCXR: Negative  4/19/2025 CT A/P wo:    KIDNEYS/URETERS: 5 mm nonobstructing calculus left mid kidney. 4 mm calculus left UPJ, without obstruction   Per [facility] meds, not on ACE or ARB.   Spironolactone 50 mg oral daily currently on hold  Plans:  Would recommend gentle IV fluids as showing improvement in creatinine as well as sodium  Recommend avoiding all NSAIDs, nephrotoxic agents, as well as IV contrast  Avoid hypotension and any perturbations in blood pressure  Closely monitor I&O, daily weight and labs  Hyponatremia  Etiology: likely d/t poor oral intake + diuretic use + hypovolemic  Current sodium 131, on admission 128  Patient given 1 L Plasma-Lyte bolus in ER with improvement  Decreased appetite per patient  Workup:   Urine Osmo 420, No serum Osmo   TSH 3.388, No uric acid, No AM cortisol  Glucose appropriate  Plan:  Would recommend continuing with gentle IV fluids as patient has shown improvement in sodium  Serum Osmo, uric acid and a.m. cortisol placed for completion of workup  Continue holding diuretic  Avoid over correcting to avoid ODS, preferably 4-6pts in 24 hours. No greater than 133 sodium by 10pm tonight  Anemia  Current hemoglobin 6.8  1 unit leukocyte reduced PRBC ordered per primary team  Patient has history of anemia  Recommend H&H check post infusion  Monitor hemoglobin keep greater than 7  Vitamin D deficiency  External vitamin D 112  Per [facility] meds, patient on Cholecalciferol 50,000 weekly.  Monitor levels   Other cirrhosis of liver (HCC)  US right upper quadrant, 4/10/2025: Cirrhosis with severe hepatic steatosis and severe hepatomegaly with sequela of portal hypertension including moderate ascites   Patient due for inpatient paracentesis with IR  Pancreatic cyst  History of idiopathic pancreatitis  Presented with mild abdominal pain, currently denies pain  CT A/P wo:  6.5 cm cystic mass in the pancreatic body/tail   Patient due for MRI/MRCP with contrast however due to BUD would hold off on any administration of IV  contrast  Idiopathic pancreatitis  Gastroenterology following    HISTORY OF PRESENT ILLNESS:  Requesting Physician: Radha Srivastava MD  Reason for Consult: CKD    Sudhir Bach is a 55 y.o. male who was admitted to [facility] ER after presenting with fatigue.  Patient has had complaints of fatigue and weakness for couple weeks, as well is abdominal pain.  At this current time, patient denies abdominal pain.  PMH: nonalcoholic liver cirrhosis, transaminitis, anxiety, hypertension, CKD, hyperlipidemia, vitamin D deficiency, idiopathic pancreatitis.  Patient was seen outpatient with GI due to abnormal liver blood tests as well as abdominal distention/pressure.  Patient reports drinking alcohol in the past, up to 2-3 drinks daily but drinks sparingly now and only for very special occasions.   A renal consultation is requested today for assistance in the management of CKD.  As stated above patient follows with [facility].     Patient seen and assessed at bedside.  Patient awake and alert.  Currently denies pain as well as any pain in abdomen.  Denies nausea and vomiting.  Also denies any shortness of breath and chest pain.  Patient exhibiting euvolemic.  Patient appearing jaundiced.     PAST MEDICAL HISTORY:  Past Medical History:   Diagnosis Date    Anxiety     Chronic kidney disease     Colon polyp     Hyperlipidemia     Hypertension        PAST SURGICAL HISTORY:  Past Surgical History:   Procedure Laterality Date    COLONOSCOPY      EGD AND COLONOSCOPY  12/12/2024    US GUIDED KIDNEY BIOPSY  11/14/2018       ALLERGIES:  No Known Allergies    SOCIAL HISTORY:  Social History     Substance and Sexual Activity   Alcohol Use Not Currently     Social History     Substance and Sexual Activity   Drug Use Never     Social History     Tobacco Use   Smoking Status Never   Smokeless Tobacco Never       FAMILY HISTORY:  Family History   Problem Relation Age of Onset    Hypertension Father     Colon cancer  Neg Hx        MEDICATIONS:    Current Facility-Administered Medications:     escitalopram (LEXAPRO) tablet 10 mg, 10 mg, Oral, Daily, Sudhir Mcdonald DO, 10 mg at 04/20/25 0850    heparin (porcine) subcutaneous injection 5,000 Units, 5,000 Units, Subcutaneous, Q8H PAULY, Sudhir Mcdonald DO, 5,000 Units at 04/19/25 1754    multi-electrolyte (Plasmalyte-A/Isolyte-S PH 7.4/Normosol-R) IV bolus 1,000 mL, 1,000 mL, Intravenous, Once, Sudhir Mcdonald DO, Last Rate: 0 mL/hr at 04/19/25 1833, Restarted at 04/19/25 2359    pantoprazole (PROTONIX) EC tablet 40 mg, 40 mg, Oral, Daily, Sudhir Mcdonald DO    [Held by provider] spironolactone (ALDACTONE) tablet 50 mg, 50 mg, Oral, Daily, Sudhir Mcdonald DO    REVIEW OF SYSTEMS:  Constitutional: + fatigue, + weakness  Respiratory: Negative for cough, shortness of breath and wheezing.   Cardiovascular: Negative for chest pain, palpitations and leg swelling.   Gastrointestinal: + abdominal pain   All the systems were reviewed and were negative except as documented on the HPI.    PHYSICAL EXAM:  Current Weight: Weight - Scale: 68.7 kg (151 lb 8 oz)  First Weight: Weight - Scale: 73.9 kg (163 lb)  Vitals:    04/20/25 1123 04/20/25 1128 04/20/25 1129 04/20/25 1144   BP: (!) 91/45 (!) 90/45  (!) 90/43   BP Location:       Pulse: 81 79  81   Resp: 18 18 18 18   Temp: 98.3 °F (36.8 °C) (!) 97.4 °F (36.3 °C)  98.2 °F (36.8 °C)   TempSrc: Oral Oral Oral Oral   SpO2: 94% 97%  96%   Weight:       Height:           Intake/Output Summary (Last 24 hours) at 4/20/2025 1203  Last data filed at 4/20/2025 0846  Gross per 24 hour   Intake 720 ml   Output 0 ml   Net 720 ml     Physical Exam  Vitals and nursing note reviewed.   Constitutional:       General: He is not in acute distress.     Appearance: He is ill-appearing.   HENT:      Head: Normocephalic.      Mouth/Throat:      Mouth: Mucous membranes are moist.   Cardiovascular:      Rate and Rhythm: Normal rate.      Pulses: Normal pulses.      Heart  "sounds: No murmur heard.     No gallop.   Pulmonary:      Effort: No respiratory distress.      Breath sounds: Normal breath sounds. No wheezing or rales.   Abdominal:      General: Bowel sounds are normal. There is distension.      Palpations: Abdomen is soft.      Tenderness: There is no abdominal tenderness.   Musculoskeletal:      Right lower leg: No edema.      Left lower leg: No edema.   Skin:     General: Skin is warm and dry.      Capillary Refill: Capillary refill takes less than 2 seconds.      Coloration: Skin is jaundiced.   Neurological:      Mental Status: He is alert and oriented to person, place, and time. Mental status is at baseline.   Psychiatric:         Mood and Affect: Mood normal.         Behavior: Behavior normal.         Invasive Devices:      Lab Results:   Results from last 7 days   Lab Units 04/20/25  0527 04/19/25  1038 04/16/25  1215   WBC Thousand/uL 17.75* 22.88*  --    HEMOGLOBIN g/dL 6.8* 7.7*  --    HEMATOCRIT % 20.3* 23.6*  --    PLATELETS Thousands/uL 64* 88*  --    POTASSIUM mmol/L 4.0 4.3 4.5   CHLORIDE mmol/L 105 103 102   CO2 mmol/L 19* 17* 17*   BUN mg/dL 16 16 17   CREATININE mg/dL 2.47* 2.73* 2.72*   CALCIUM mg/dL 9.4 9.8 9.6   MAGNESIUM mg/dL 2.0 1.9  --    PHOSPHORUS mg/dL 2.9  --   --    ALK PHOS U/L 94 111* 131*   ALT U/L 32 39 37   AST U/L 43* 61* 54*     Other Studies:     Portions of the record may have been created with voice recognition software. Occasional wrong word or \"sound a like\" substitutions may have occurred due to the inherent limitations of voice recognition software. Read the chart carefully and recognize, using context, where substitutions have occurred.If you have any questions, please contact the dictating provider.  "

## 2025-04-20 NOTE — ASSESSMENT & PLAN NOTE
Patient with past medical history of idiopathic pancreatitis    CT abdomen/pelvis-  6.5 cm cystic mass in the pancreatic body/tail, indeterminate. Additional 11 mm pancreatic head cyst.     Lipase-normal  MRI/MRCP abdomen ordered and pending

## 2025-04-21 PROBLEM — N18.9 CHRONIC RENAL FAILURE: Status: ACTIVE | Noted: 2025-04-21

## 2025-04-21 PROBLEM — E83.52 HYPERCALCEMIA: Status: ACTIVE | Noted: 2025-04-21

## 2025-04-21 PROBLEM — E87.8 LOW BICARBONATE: Status: ACTIVE | Noted: 2025-04-21

## 2025-04-21 LAB
ABO GROUP BLD BPU: NORMAL
ALBUMIN SERPL BCG-MCNC: 2 G/DL (ref 3.5–5)
ALP SERPL-CCNC: 102 U/L (ref 34–104)
ALT SERPL W P-5'-P-CCNC: 31 U/L (ref 7–52)
ANION GAP SERPL CALCULATED.3IONS-SCNC: 7 MMOL/L (ref 4–13)
ANISOCYTOSIS BLD QL SMEAR: PRESENT
AST SERPL W P-5'-P-CCNC: 39 U/L (ref 13–39)
BASOPHILS # BLD AUTO: 0.05 THOUSANDS/ÂΜL (ref 0–0.1)
BASOPHILS NFR BLD AUTO: 0 % (ref 0–1)
BILIRUB SERPL-MCNC: 3.77 MG/DL (ref 0.2–1)
BPU ID: NORMAL
BUN SERPL-MCNC: 15 MG/DL (ref 5–25)
CALCIUM ALBUM COR SERPL-MCNC: 10.9 MG/DL (ref 8.3–10.1)
CALCIUM SERPL-MCNC: 9.3 MG/DL (ref 8.4–10.2)
CHLORIDE SERPL-SCNC: 104 MMOL/L (ref 96–108)
CO2 SERPL-SCNC: 19 MMOL/L (ref 21–32)
CREAT SERPL-MCNC: 2.3 MG/DL (ref 0.6–1.3)
CROSSMATCH: NORMAL
EOSINOPHIL # BLD AUTO: 0.07 THOUSAND/ÂΜL (ref 0–0.61)
EOSINOPHIL NFR BLD AUTO: 1 % (ref 0–6)
ERYTHROCYTE [DISTWIDTH] IN BLOOD BY AUTOMATED COUNT: 20.8 % (ref 11.6–15.1)
GFR SERPL CREATININE-BSD FRML MDRD: 30 ML/MIN/1.73SQ M
GLUCOSE SERPL-MCNC: 137 MG/DL (ref 65–140)
HCT VFR BLD AUTO: 23.4 % (ref 36.5–49.3)
HGB BLD-MCNC: 7.1 G/DL (ref 12–17)
HGB BLD-MCNC: 7.6 G/DL (ref 12–17)
HGB BLD-MCNC: 7.9 G/DL (ref 12–17)
IMM GRANULOCYTES # BLD AUTO: 0.09 THOUSAND/UL (ref 0–0.2)
IMM GRANULOCYTES NFR BLD AUTO: 1 % (ref 0–2)
INR PPP: 1.47 (ref 0.85–1.19)
LYMPHOCYTES # BLD AUTO: 2.98 THOUSANDS/ÂΜL (ref 0.6–4.47)
LYMPHOCYTES NFR BLD AUTO: 19 % (ref 14–44)
MACROCYTES BLD QL AUTO: PRESENT
MCH RBC QN AUTO: 34.6 PG (ref 26.8–34.3)
MCHC RBC AUTO-ENTMCNC: 33.8 G/DL (ref 31.4–37.4)
MCV RBC AUTO: 103 FL (ref 82–98)
MONOCYTES # BLD AUTO: 1.38 THOUSAND/ÂΜL (ref 0.17–1.22)
MONOCYTES NFR BLD AUTO: 9 % (ref 4–12)
NEUTROPHILS # BLD AUTO: 10.78 THOUSANDS/ÂΜL (ref 1.85–7.62)
NEUTS SEG NFR BLD AUTO: 70 % (ref 43–75)
NRBC BLD AUTO-RTO: 0 /100 WBCS
PLATELET # BLD AUTO: 63 THOUSANDS/UL (ref 149–390)
PLATELET BLD QL SMEAR: ABNORMAL
PMV BLD AUTO: 11.4 FL (ref 8.9–12.7)
POIKILOCYTOSIS BLD QL SMEAR: PRESENT
POTASSIUM SERPL-SCNC: 3.6 MMOL/L (ref 3.5–5.3)
PROT SERPL-MCNC: 7.1 G/DL (ref 6.4–8.4)
PROTHROMBIN TIME: 18.3 SECONDS (ref 12.3–15)
PTH-INTACT SERPL-MCNC: 5.7 PG/ML (ref 12–88)
RBC # BLD AUTO: 2.28 MILLION/UL (ref 3.88–5.62)
RBC MORPH BLD: PRESENT
SODIUM SERPL-SCNC: 130 MMOL/L (ref 135–147)
UNIT DISPENSE STATUS: NORMAL
UNIT PRODUCT CODE: NORMAL
UNIT PRODUCT VOLUME: 350 ML
UNIT RH: NORMAL
WBC # BLD AUTO: 15.35 THOUSAND/UL (ref 4.31–10.16)

## 2025-04-21 PROCEDURE — 85025 COMPLETE CBC W/AUTO DIFF WBC: CPT | Performed by: INTERNAL MEDICINE

## 2025-04-21 PROCEDURE — 85610 PROTHROMBIN TIME: CPT | Performed by: INTERNAL MEDICINE

## 2025-04-21 PROCEDURE — 85018 HEMOGLOBIN: CPT | Performed by: INTERNAL MEDICINE

## 2025-04-21 PROCEDURE — 99232 SBSQ HOSP IP/OBS MODERATE 35: CPT | Performed by: INTERNAL MEDICINE

## 2025-04-21 PROCEDURE — 80053 COMPREHEN METABOLIC PANEL: CPT | Performed by: INTERNAL MEDICINE

## 2025-04-21 PROCEDURE — 99233 SBSQ HOSP IP/OBS HIGH 50: CPT | Performed by: INTERNAL MEDICINE

## 2025-04-21 RX ADMIN — SODIUM CHLORIDE, SODIUM GLUCONATE, SODIUM ACETATE, POTASSIUM CHLORIDE, MAGNESIUM CHLORIDE, SODIUM PHOSPHATE, DIBASIC, AND POTASSIUM PHOSPHATE 75 ML/HR: .53; .5; .37; .037; .03; .012; .00082 INJECTION, SOLUTION INTRAVENOUS at 05:40

## 2025-04-21 RX ADMIN — HEPARIN SODIUM 5000 UNITS: 5000 INJECTION INTRAVENOUS; SUBCUTANEOUS at 14:32

## 2025-04-21 RX ADMIN — HEPARIN SODIUM 5000 UNITS: 5000 INJECTION INTRAVENOUS; SUBCUTANEOUS at 23:30

## 2025-04-21 RX ADMIN — PANTOPRAZOLE SODIUM 40 MG: 40 INJECTION, POWDER, FOR SOLUTION INTRAVENOUS at 23:30

## 2025-04-21 RX ADMIN — ESCITALOPRAM OXALATE 10 MG: 10 TABLET ORAL at 08:17

## 2025-04-21 RX ADMIN — PANTOPRAZOLE SODIUM 40 MG: 40 INJECTION, POWDER, FOR SOLUTION INTRAVENOUS at 08:17

## 2025-04-21 RX ADMIN — HEPARIN SODIUM 5000 UNITS: 5000 INJECTION INTRAVENOUS; SUBCUTANEOUS at 05:41

## 2025-04-21 NOTE — ASSESSMENT & PLAN NOTE
Patient with past medical history of CKD  Patient follows with [facility] nephrology  Patient current creatinine is 2.47 and baseline was 1.7  Patient was started recently on spironolactone  Plan  Hold spironolactone  Avoid nephrotoxic agent   Nephrology following

## 2025-04-21 NOTE — CASE MANAGEMENT
Case Management Assessment & Discharge Planning Note    Patient name Sudhir Bach  Location /-01 MRN [id number]  : 1970 Date 2025       Current Admission Date: 2025  Current Admission Diagnosis:Other cirrhosis of liver (HCC)   Patient Active Problem List    Diagnosis Date Noted Date Diagnosed    Hypercalcemia 2025     Hyponatremia 2025     Pancreatic cyst 2025     Other cirrhosis of liver (HCC) 2025     BUD (acute kidney injury) (HCC) 2025     SIRS (systemic inflammatory response syndrome) (HCC) 2025     Weight loss 2025     Anemia 2025     Pseudopolyposis of colon without complication, unspecified part of colon (HCC) 2025     Renal sclerosis, unspecified 2024     Biliary acute pancreatitis without necrosis or infection 2024     Vitamin D deficiency 2024     B12 deficiency 2024     Acute on chronic diastolic heart failure (HCC) 2024     Idiopathic pancreatitis 05/10/2023     Cholelithiasis 05/10/2023     History of CVA (cerebrovascular accident) 2023     Thrombocytopenia (HCC) 2023     History of anemia due to chronic kidney disease 2023     CKD (chronic kidney disease) stage 4, GFR 15-29 ml/min (HCC) 2023     History of seizure 2018     Primary hypertension 2018     Mixed hyperlipidemia 2011     Erectile dysfunction 2011       LOS (days): 2  Geometric Mean LOS (GMLOS) (days):   Days to GMLOS:     OBJECTIVE:    Risk of Unplanned Readmission Score: 15.76         Current admission status: Inpatient       Preferred Pharmacy:   [id number] - , [place] - [address]  [address]  [place] [address]  Phone: [phone] Fax: [phone]    Primary Care Provider: Sandra Mustafa DO    Primary Insurance: AETNA  Secondary Insurance:     ASSESSMENT:  Active Health Care Proxies       [facility]  Representative - Sister   Primary Phone: [phone] (Mobile)  Home Phone: [phone]                 Advance Directives  Does patient have a Health Care POA?: No  Was patient offered paperwork?: Yes (declined)  Does patient currently have a Health Care decision maker?: Yes, please see Health Care Proxy section  Does patient have Advance Directives?: No  Was patient offered paperwork?: Yes (declined)  Primary Contact: Rani (sister)    Readmission Root Cause  30 Day Readmission: No    Patient Information  Admitted from:: Home  Mental Status: Alert  During Assessment patient was accompanied by: Not accompanied during assessment  Assessment information provided by:: Patient  Primary Caregiver: Self  Support Systems: Family members  Home entry access options. Select all that apply.: Stairs  Number of steps to enter home.: 1  Type of Current Residence: Other (Comment) (Condo)  Living Arrangements: Lives Alone    Activities of Daily Living Prior to Admission  Functional Status: Independent  Completes ADLs independently?: Yes  Ambulates independently?: Yes  Does patient use assisted devices?: No  Does patient currently own DME?: No  Does patient have a history of Outpatient Therapy (PT/OT)?: No  Does the patient have a history of Short-Term Rehab?: No  Does patient have a history of HHC?: No  Does patient currently have HHC?: No    Patient Information Continued  Does patient have prescription coverage?: Yes  Can the patient afford their medications and any related supplies (such as glucometers or test strips)?: Yes  Does patient receive dialysis treatments?: No  Does patient have a history of substance abuse?: No  Does patient have a history of Mental Health Diagnosis?: No    Means of Transportation  [facility] D[facility] Self    DISCHARGE DETAILS:    Discharge planning discussed with:: patient  Freedom of Choice: Yes  Comments - Freedom of Choice: no anticipated dc needs  CM contacted  family/caregiver?: No- see comments (reports being in contact with family)  Were Treatment Team discharge recommendations reviewed with patient/caregiver?: Yes  Did patient/caregiver verbalize understanding of patient care needs?: Yes  Were patient/caregiver advised of the risks associated with not following Treatment Team discharge recommendations?: Yes    Requested Home Health Care         Is the patient interested in HHC at discharge?: No    DME Referral Provided  Referral made for DME?: No    Treatment Team Recommendation: Home  Discharge Destination Plan:: Home  Transport at Discharge : Family     Additional Comments: Met with pt to discuss the role of CM and to discuss any help pt may need prior to dc. Pt lives alone in a 2 story condo with 1 SIMI. Pt performed ADL's indptly pta, no use of DME. No hx of HHC or rehab. No hx of mental health or D&A treatment. Pt's preferred pharmacy is [facility] [place]  [facility]. Pt drives. Pt's friend Bogdan will transport home at dc.

## 2025-04-21 NOTE — ASSESSMENT & PLAN NOTE
Etiology: likely d/t poor oral intake + diuretic use + hypovolemic along with component of SIADH  Admission sodium was 128  Current sodium 130<131  On IVF  On RF 1.8 liters  Workup:   Urine Osmo 420-,urine sodium 69-suggestive of SIADH  serum osm-289  TSH 3.388, random cortisol at 5: 45 pm 9.6  Uric acid 4.7  Plan:  With decreasing sodium will stop IVF  Continue holding diuretic for now  Avoid over correcting   Continue FR 1.8 liters  Check BMP in am

## 2025-04-21 NOTE — ASSESSMENT & PLAN NOTE
Admission corrected calcium 11.2  Likely in the setting of elevated Vit D and taking supplements along with volume depletion  Most recent corrected calcium 10.9 today  Would further vitamin D and calcium supplements  Check ionized calcium in a.m.

## 2025-04-21 NOTE — PLAN OF CARE
Problem: Potential for Falls  Goal: Patient will remain free of falls  Description: INTERVENTIONS:- Educate patient/family on patient safety including physical limitations- Instruct patient to call for assistance with activity - Consult OT/PT to assist with strengthening/mobility - Keep Call bell within reach- Keep bed low and locked with side rails adjusted as appropriate- Keep care items and personal belongings within reach- Initiate and maintain comfort rounds- Make Fall Risk Sign visible to staff- Offer Toileting every 2 Hours, in advance of need- Initiate/Maintain alarm- Obtain necessary fall risk management equipment: socks- Apply yellow socks and bracelet for high fall risk patients- Consider moving patient to room near nurses station  Outcome: Progressing     Problem: PAIN - ADULT  Goal: Verbalizes/displays adequate comfort level or baseline comfort level  Description: Interventions:- Encourage patient to monitor pain and request assistance- Assess pain using appropriate pain scale- Administer analgesics based on type and severity of pain and evaluate response- Implement non-pharmacological measures as appropriate and evaluate response- Consider cultural and social influences on pain and pain management- Notify physician/advanced practitioner if interventions unsuccessful or patient reports new pain  Outcome: Progressing     Problem: GASTROINTESTINAL - ADULT  Goal: Minimal or absence of nausea and/or vomiting  Description: INTERVENTIONS:- Administer IV fluids if ordered to ensure adequate hydration- Maintain NPO status until nausea and vomiting are resolved- Nasogastric tube if ordered- Administer ordered antiemetic medications as needed- Provide nonpharmacologic comfort measures as appropriate- Advance diet as tolerated, if ordered- Consider nutrition services referral to assist patient with adequate nutrition and appropriate food choices  Outcome: Progressing  Goal: Maintains or returns to baseline bowel  function  Description: INTERVENTIONS:- Assess bowel function- Encourage oral fluids to ensure adequate hydration- Administer IV fluids if ordered to ensure adequate hydration- Administer ordered medications as needed- Encourage mobilization and activity- Consider nutritional services referral to assist patient with adequate nutrition and appropriate food choices  Outcome: Progressing     Problem: METABOLIC, FLUID AND ELECTROLYTES - ADULT  Goal: Electrolytes maintained within normal limits  Description: INTERVENTIONS:- Monitor labs and assess patient for signs and symptoms of electrolyte imbalances- Administer electrolyte replacement as ordered- Monitor response to electrolyte replacements, including repeat lab results as appropriate- Instruct patient on fluid and nutrition as appropriate  Outcome: Progressing  Goal: Fluid balance maintained  Description: INTERVENTIONS:- Monitor labs - Monitor I/O and WT- Instruct patient on fluid and nutrition as appropriate- Assess for signs & symptoms of volume excess or deficit  Outcome: Progressing     Problem: HEMATOLOGIC - ADULT  Goal: Maintains hematologic stability  Description: INTERVENTIONS- Assess for signs and symptoms of bleeding or hemorrhage- Monitor labs- Administer supportive blood products/factors as ordered and appropriate  Outcome: Progressing

## 2025-04-21 NOTE — ASSESSMENT & PLAN NOTE
Current hemoglobin 7.9  S/p 1 unit leukocyte reduced PRBC ordered per primary team  Patient has history of anemia  Monitor hemoglobin keep greater than 7  Management per primary team

## 2025-04-21 NOTE — ASSESSMENT & PLAN NOTE
Etiology: multifactorial:  Prerenal given  poor oral intake + hypovolemia/dehydration leading to ATN  Admission creatinine 2.73  Baseline creatinine 1.8-2.0 since 2024.  Prior to that 3.0-4.0 since 2023  Currently on IVF  plasmalyte at 75 ml/hour  Current creatinine 2.30  Not on ace/arb outpateint  On Spironolactone 50 mg oral daily  outpatient-currently on hold  Workup:  UA: Trace blood and leukocytes, +1 protein, moderate epithelial cells, 20-30 hyaline casts, 3-5 granular casts, innumerable bacteria and mucus threads  Urine electrolytes: Urine chloride 88, urine creatinine 129, urine sodium 69, urine urea 367   FENa: 1.0% -indeterminate.  Can be seen with either prerenal or intrinsic BUD  FEUrea: 43.9% - >35% suggestive of intrinsic A  PCXR: Negative  4/19/2025 CT A/P wo: 5 mm nonobstructing calculus left mid kidney. 4 mm calculus left UPJ, without obstruction.   Plan:  Avoid all NSAIDs, nephrotoxic agents, as well as IV contrast  Avoid hypotension and any perturbations in blood pressure  Closely monitor I&O, daily weight and labs  Will decrease IVF to 50 ml/hour

## 2025-04-21 NOTE — ASSESSMENT & PLAN NOTE
Patient has history of anemia  Iron is 43 and ferritin 1365  Probably due to anemia of chronic disease plus iron deficiency  Follow-up on nephrology recommendations  Maintain hemoglobin above 7 and transfuse as needed  Status post 1 PRBC transfusion  Current hemoglobin 7.9  Monitor hemoglobin q12

## 2025-04-21 NOTE — ASSESSMENT & PLAN NOTE
Patient was past medical history of transaminitis  Patient presents with generalized weakness and lethargy  Patient follow-up with gastroenterology  Was concern for cirrhosis probably due to [place] due to new onset ascites and abnormal liver function test as patient denied any alcohol intake  Patient was seen recently by gastroenterology and autoimmune, viral serology, ceruloplasmin, iron panel were ordered  Chronic viral serology are nonreactive  Ceruloplasmin is 9.2  Patient recent labs showed worsening kidney function with creatinine of 2.73, AST 61, sodium 128, T. bili 3.77, INR 1.4  Patient was referred to the ED for extensive workup  Patient presented with weakness  GI recommended starting low-dose of spironolactone  Plan  Consult gastroenterology appreciate recommendations  Will hold spironolactone in the setting of BUD  Trend MELD labs  GI planning for liver biopsy   GI follow-up

## 2025-04-21 NOTE — PROGRESS NOTES
Progress note - [facility]   Sudhir Bach 55 y.o. male MRN: [id number]  Unit/Bed#: -01 Encounter: [id number]    ASSESSMENT and PLAN    Other cirrhosis of liver (HCC)  Newly diagnosed. There is evidence of cirrhosis based on labs, imaging, and physical exam. Bilirubin 2.96. Albumin 1.9. INR 1.57. Platelets 64,000. RUQ US 4/10/2025 shows cirrhosis with severe steatosis, hepatomegaly, sequela of portal HTN including moderate ascites. CT A/P 4/19 again shows evidence of cirrhosis, moderate to large ascites. **Note new information patient has been admitted for before for alcohol use disorder although he denies.  Probably the etiology of his liver disease.  As part of most recent workup, significant for low ceruloplasmin suspicious for Roque's disease and elevated ferritin 1365, concern for hemochromatosis. Autoimmune and viral serologies negative. MELD 3.0 = 29 on 4/20.     - Monitor MELD labs daily  - Check HFE gene mutation  - We will discuss case with our hepatologist tomorrow, patient may need liver biopsy given uncertainty regarding the etiology for his cirrhosis  - Recommend close outpatient GI follow-up  - Due to low ceruloplasmin, he will eventually need a workup for Roque's disease including 24-hour urine copper.   -Discussed workup by text with Dr. Mota from hepatology.  Suggest waiting the HFE 24-hour urine copper before more extensive workup for nonalcohol etiology.  Ascites:  -Recommend diagnostic and therapeutic paracentesis to rule out SBP and confirm ascites is secondary to cirrhosis/portal HTN  -Recommend low sodium diet  -Hold spironolactone due to BUD     HE:  No overt s/sx at this time     Screening for esophageal varices:  No evidence of varices on EGD from December 2024  - He will eventually need repeat EGD due to new hepatic decompensation     HCC screening  No evidence of hepatoma on RUQ ultrasound 4/10  BUD (acute kidney injury) (HCC)  BUD on CKD, current  "creatinine 2.73 compared to baseline 1.7. Patient recently initiated on spironolactone.     - Agree with holding spironolactone  - Appreciate nephrology recommendations  Anemia  Hemoglobin 7.7 on admission, down from 12.5 in September 2023. MCV high 114. Vitamin B12 greater than 2000. No recent folate level. Iron 43 and ferritin 1365. Recent EGD and colonoscopy from December 2024 significant for gastritis and mild Schatzki's ring, colon polyps, diverticulosis, hemorrhoids. No current signs of overt GI bleeding.     - Monitor H&H and transfuse to keep hemoglobin greater than 7  - Check folate level  - Monitor for GI bleeding  - Patient will need repeat EGD to screen for esophageal varices given new decompensation  Pancreatic cyst  Patient with history of idiopathic pancreatitis. No evidence of pancreatitis at present time. CT shows apparent cystic mass in the pancreatic body/tail measuring approximately 6.5 x 4.3 cm and another 11 mm cyst along the medial pancreatic head. Suspect cysts may be pseudocysts related to prior episode of pancreatitis.     - Follow-up results of MRI/MRCP for further characterization of the cysts  - Likely will require EUS for sampling of the cysts depending on MRI/MRCP results     I have discussed the above management plan in detail with the primary service.        Chief Complaint   Patient presents with    Fatigue     Pt arrives and states \"I feel weak and fatigue for the last couple weeks\" Pt reports abd pain. Denies any nausea, vomiting, fevers.        SUBJECTIVE/HPI   Patient seen earlier.  Comfortable in bed no complaints of pain.  Tolerating p.o.  Denies any diarrhea rectal bleeding or melena.    BP 98/57   Pulse 77   Temp 97.8 °F (36.6 °C)   Resp 20   Ht 5' 8\" (1.727 m)   Wt 68.7 kg (151 lb 8 oz)   SpO2 93%   BMI 23.04 kg/m²     PHYSICALEXAM  General appearance: alert, appears stated age and cooperative  Eyes: , no icterus pale  Head: Normocephalic, without obvious " "abnormality, atraumatic  Abdomen: soft, Slight distention non-tender; bowel sounds normal; no masses,  no organomegaly  Extremities: extremities normal, atraumatic, no cyanosis or edema  Neurologic: Grossly normal    Lab Results   Component Value Date    CALCIUM 9.3 04/21/2025    K 3.6 04/21/2025    CO2 19 (L) 04/21/2025     04/21/2025    BUN 15 04/21/2025    CREATININE 2.30 (H) 04/21/2025     Lab Results   Component Value Date    WBC 15.35 (H) 04/21/2025    HGB 7.9 (L) 04/21/2025    HCT 23.4 (L) 04/21/2025     (H) 04/21/2025    PLT 63 (L) 04/21/2025     Lab Results   Component Value Date    ALT 31 04/21/2025    AST 39 04/21/2025    ALKPHOS 102 04/21/2025     No results found for: \"AMYLASE\"  Lab Results   Component Value Date    LIPASE 29 04/19/2025     Lab Results   Component Value Date    IRON 43 (L) 04/16/2025    TIBC  04/16/2025      Comment:      Unable to calculate.    FERRITIN 1,365 (H) 04/16/2025     Lab Results   Component Value Date    INR 1.47 (H) 04/21/2025     "

## 2025-04-21 NOTE — PROGRESS NOTES
Progress Note - Nephrology   Name: Sudhir Bach 55 y.o. male I MRN: [id number]  Unit/Bed#: -01 I Date of Admission: 4/19/2025   Date of Service: 4/20/2025 I Hospital Day: 1     Assessment & Plan  BUD (acute kidney injury) (HCC)  Etiology: multifactorial:  Prerenal given  poor oral intake + hypovolemia/dehydration leading to ATN  Admission creatinine 2.73  Baseline creatinine 1.8-2.0 since 2024.  Prior to that 3.0-4.0 since 2023  Currently on IVF  plasmalyte at 75 ml/hour  Current creatinine 2.30  Not on ace/arb outpateint  On Spironolactone 50 mg oral daily  outpatient-currently on hold  Workup:  UA: Trace blood and leukocytes, +1 protein, moderate epithelial cells, 20-30 hyaline casts, 3-5 granular casts, innumerable bacteria and mucus threads  Urine electrolytes: Urine chloride 88, urine creatinine 129, urine sodium 69, urine urea 367   FENa: 1.0% -indeterminate.  Can be seen with either prerenal or intrinsic BUD  FEUrea: 43.9% - >35% suggestive of intrinsic A  PCXR: Negative  4/19/2025 CT A/P wo: 5 mm nonobstructing calculus left mid kidney. 4 mm calculus left UPJ, without obstruction.   Plan:  Avoid all NSAIDs, nephrotoxic agents, as well as IV contrast  Avoid hypotension and any perturbations in blood pressure  Closely monitor I&O, daily weight and labs  Will decrease IVF to 50 ml/hour   CKD (chronic kidney disease) stage 4, GFR 15-29 ml/min ([facility])  Etiology: suspect prior BUD episode requiring RRT in 2018   Baseline creatinine 1.8-2.0 dating back to 2024; has had fluctuating creatinine 3-4.0 though 2023  Patient follows outpatient with [facility] nephrology. Nancy Yeung  admission creatinine 2.73  Recommend follow up with primary nephrologist as outpatient on discharge  Hyponatremia  Etiology: likely d/t poor oral intake + diuretic use + hypovolemic along with component of SIADH  Admission sodium was 128  Current sodium 130<131  On IVF  On RF 1.8 liters  Workup:   Urine  Osmo 420-,urine sodium 69-suggestive of SIADH  serum osm-289  TSH 3.388, random cortisol at 5: 45 pm 9.6  Uric acid 4.7  Plan:  With decreasing sodium will stop IVF  Continue holding diuretic for now  Avoid over correcting   Continue FR 1.8 liters  Check BMP in am  Anemia  Current hemoglobin 7.9  S/p 1 unit leukocyte reduced PRBC ordered per primary team  Patient has history of anemia  Monitor hemoglobin keep greater than 7  Management per primary team  Hypercalcemia  Admission corrected calcium 11.2  Likely in the setting of elevated Vit D and taking supplements along with volume depletion  Most recent corrected calcium 10.9 today  Would further vitamin D and calcium supplements  Check ionized calcium in a.m.  Vitamin D deficiency  Vit D > 120  Has hypercalcemia as well on admission  Per [employer] meds, patient on Cholecalciferol 50,000 weekly.  Hold vit D and calcium supplements for now  Monitor levels   Can take time to improve off treatment  Other cirrhosis of liver (HCC)  US right upper quadrant, 4/10/2025: Cirrhosis with severe hepatic steatosis and severe hepatomegaly with sequela of portal hypertension including moderate ascites   Patient due for inpatient paracentesis with IR  Recommend albumin for high-volume paracentesis  Pancreatic cyst  History of idiopathic pancreatitis  Presented with mild abdominal pain, currently denies pain  CT A/P wo:  6.5 cm cystic mass in the pancreatic body/tail   Patient due for MRI/MRCP with contrast however due to BUD would hold off on any administration of IV contrast  Idiopathic pancreatitis  Gastroenterology following    Overall plan and recommendations has been reviewed with primary team and I agree with the plan as stated below  We will discontinue IV fluids for now given decreasing sodium after improvement setting of SIADH  Continue to hold vitamin D and calcium supplementation  Renal function is slowly improving  Current oral intake however continue to maintain a 1.8  L fluid restriction  NP in a.m.    Review of Systems  Patient seen and examined at bedside.  Patient reports overall feeling better since yesterday at least 50%.  No acute issues reported  Review of Systems   Constitutional: Negative.  Negative for activity change, appetite change, chills, diaphoresis, fatigue and fever.   HENT: Negative.  Negative for congestion and facial swelling.    Respiratory: Negative.     Cardiovascular: Negative.    Gastrointestinal: Negative.    Endocrine: Negative.    Genitourinary: Negative.    Musculoskeletal: Negative.    Skin: Negative.    Allergic/Immunologic: Negative.    Neurological: Negative.    Hematological: Negative.    Psychiatric/Behavioral: Negative.           Physical Exam:  Current Weight: Weight - Scale: 68.7 kg (151 lb 8 oz)  Vitals:    04/20/25 1355 04/20/25 1553 04/20/25 1904 04/20/25 1905   BP: 94/55 98/50 (!) 92/46 100/52   BP Location:    Left arm   Pulse: 89 79 80 74   Resp: 18 18  20   Temp: 97.7 °F (36.5 °C) (!) 97.4 °F (36.3 °C) 97.7 °F (36.5 °C) 97.7 °F (36.5 °C)   TempSrc: Oral   Oral   SpO2: 99% 96% 98% 98%   Weight:       Height:           Physical Exam  Vitals and nursing note reviewed.   Constitutional:       Appearance: Normal appearance.   HENT:      Head: Normocephalic and atraumatic.      Nose: Nose normal.      Mouth/Throat:      Mouth: Mucous membranes are moist.      Pharynx: Oropharynx is clear.   Eyes:      Extraocular Movements: Extraocular movements intact.      Conjunctiva/sclera: Conjunctivae normal.   Cardiovascular:      Rate and Rhythm: Normal rate and regular rhythm.      Pulses: Normal pulses.      Heart sounds: Normal heart sounds.   Pulmonary:      Effort: Pulmonary effort is normal.      Breath sounds: Normal breath sounds.   Abdominal:      General: Bowel sounds are normal. There is distension.      Palpations: Abdomen is soft.   Musculoskeletal:         General: Normal range of motion.      Cervical back: Normal range of motion and  neck supple.   Skin:     General: Skin is warm.      Coloration: Skin is jaundiced.   Neurological:      General: No focal deficit present.      Mental Status: He is alert and oriented to person, place, and time.   Psychiatric:         Mood and Affect: Mood normal.         Behavior: Behavior normal.            Medications:    Current Facility-Administered Medications:     escitalopram (LEXAPRO) tablet 10 mg, 10 mg, Oral, Daily, Sudhir Mcdonald DO, 10 mg at 04/20/25 0850    heparin (porcine) subcutaneous injection 5,000 Units, 5,000 Units, Subcutaneous, Q8H [facility], Sudhir Mcdonald DO, 5,000 Units at 04/20/25 1418    multi-electrolyte (Plasmalyte-A/Isolyte-S PH 7.4/Normosol-R) IV bolus 1,000 mL, 1,000 mL, Intravenous, Once, Sudhir Mcdonald DO, Last Rate: 0 mL/hr at 04/19/25 1833, Restarted at 04/19/25 2359    multi-electrolyte (Plasmalyte-A/Isolyte-S PH 7.4/Normosol-R) IV solution, 75 mL/hr, Intravenous, Continuous, Koki Moreno DO, Last Rate: 75 mL/hr at 04/20/25 1755, 75 mL/hr at 04/20/25 1755    pantoprazole (PROTONIX) injection 40 mg, 40 mg, Intravenous, Q12H [facility], Radha Srivastava MD    [Held by provider] spironolactone (ALDACTONE) tablet 50 mg, 50 mg, Oral, Daily, Sudhir Mcdonald DO    Laboratory Results:  Results from last 7 days   Lab Units 04/20/25  1745 04/20/25  0527 04/19/25  1038 04/16/25  1215   WBC Thousand/uL  --  17.75* 22.88*  --    HEMOGLOBIN g/dL 7.9* 6.8* 7.7*  --    HEMATOCRIT %  --  20.3* 23.6*  --    PLATELETS Thousands/uL  --  64* 88*  --    SODIUM mmol/L  --  131* 128* 128*   POTASSIUM mmol/L  --  4.0 4.3 4.5   CHLORIDE mmol/L  --  105 103 102   CO2 mmol/L  --  19* 17* 17*   BUN mg/dL  --  16 16 17   CREATININE mg/dL  --  2.47* 2.73* 2.72*   CALCIUM mg/dL  --  9.4 9.8 9.6   MAGNESIUM mg/dL  --  2.0 1.9  --    PHOSPHORUS mg/dL  --  2.9  --   --        Medical records have been reviewed through [facility] and care everywhere for this patient encounter    Portions of the record may have been created  "with voice recognition software. Occasional wrong word or \"sound a like\" substitutions may have occurred due to the inherent limitations of voice recognition software. Read the chart carefully and recognize,   "

## 2025-04-21 NOTE — ASSESSMENT & PLAN NOTE
Etiology: suspect prior BUD episode requiring RRT in 2018   Baseline creatinine 1.8-2.0 dating back to 2024; has had fluctuating creatinine 3-4.0 though 2023  Patient follows outpatient with [facility] nephrology. Nancy Yeung  admission creatinine 2.73  Recommend follow up with primary nephrologist as outpatient on discharge

## 2025-04-21 NOTE — ASSESSMENT & PLAN NOTE
Vit D > 120  Has hypercalcemia as well on admission  Per [facility] meds, patient on Cholecalciferol 50,000 weekly.  Hold vit D and calcium supplements for now  Monitor levels   Can take time to improve off treatment

## 2025-04-21 NOTE — PROGRESS NOTES
Progress Note - Hospitalist   Name: Sudhir Bach 55 y.o. male I MRN: [id number]  Unit/Bed#: -01 I Date of Admission: 4/19/2025   Date of Service: 4/21/2025 I Hospital Day: 2    Assessment & Plan  Other cirrhosis of liver (HCC)  Patient was past medical history of transaminitis  Patient presents with generalized weakness and lethargy  Patient follow-up with gastroenterology  Was concern for cirrhosis probably due to [place] due to new onset ascites and abnormal liver function test as patient denied any alcohol intake  Patient was seen recently by gastroenterology and autoimmune, viral serology, ceruloplasmin, iron panel were ordered  Chronic viral serology are nonreactive  Ceruloplasmin is 9.2  Patient recent labs showed worsening kidney function with creatinine of 2.73, AST 61, sodium 128, T. bili 3.77, INR 1.4  Patient was referred to the ED for extensive workup  Patient presented with weakness  GI recommended starting low-dose of spironolactone  Plan  Consult gastroenterology appreciate recommendations  Will hold spironolactone in the setting of BUD  Trend MELD labs  GI planning for liver biopsy   GI follow-up  BUD (acute kidney injury) (HCC)  Patient with past medical history of CKD  Patient follows with [facility] nephrology  Patient current creatinine is 2.47 and baseline was 1.7  Patient was started recently on spironolactone  Plan  Hold spironolactone  Avoid nephrotoxic agent   Nephrology following  Pancreatic cyst  Patient with history of idiopathic pancreatitis  Patient presented with mild abdominal pain  Patient with elevated T. bili and jaundice on exam  CT abdomen pelvis without contrast 6.5 cm cystic mass in the pancreatic body/tail, indeterminate. Additional 11 mm pancreatic head cyst.   MRCP pending  SIRS (systemic inflammatory response syndrome) (HCC)  Patient met SIRS criteria through leukocytosis and tachypnea  She denied any fever, burning urination  UA showed 2-4 white cell count  No signs of  infection  Will continue to monitor off antibiotic  Blood cultures are negative to date  Weight loss  Patient presented with weight loss  Patient reported loss of 19 pounds in the last 2 weeks  Patient also complained of weakness, lethargy  Patient had recent EGD and colonoscopy were unremarkable  In the setting of elevated liver enzymes and BUD    Mri abdomen pending  History of CVA (cerebrovascular accident)  Patient with past medical history of stroke with no residual deficits  Currently on statin  Will hold statin in the setting of elevated liver enzymes  Mixed hyperlipidemia  On statin  Will hold statin due to elevated liver enzymes  Anemia  Patient has history of anemia  Iron is 43 and ferritin 1365  Probably due to anemia of chronic disease plus iron deficiency  Follow-up on nephrology recommendations  Maintain hemoglobin above 7 and transfuse as needed  Status post 1 PRBC transfusion  Current hemoglobin 7.9  Monitor hemoglobin q12   CKD (chronic kidney disease) stage 4, GFR 15-29 ml/min ([facility])  Lab Results   Component Value Date    EGFR 28 04/20/2025    EGFR 25 04/19/2025    EGFR 25 04/16/2025    CREATININE 2.47 (H) 04/20/2025    CREATININE 2.73 (H) 04/19/2025    CREATININE 2.72 (H) 04/16/2025   Avoid nephrotoxic agents  Follow-up on creatinine  Urology following, appreciate recommendations  Urinary retention protocol  Creatinine is 2.47.  Nephrology follow-up.  Continue to hold spironolactone  Vitamin D deficiency  On vitamin D once weekly    Idiopathic pancreatitis  Patient with past medical history of idiopathic pancreatitis    CT abdomen/pelvis-  6.5 cm cystic mass in the pancreatic body/tail, indeterminate. Additional 11 mm pancreatic head cyst.     Lipase-normal  MRI/MRCP abdomen done.  Awaiting results.    Hyponatremia  Mental status is baseline  IV fluids as per nephrology  Serial BMPs  Hyponatremia work    Labs & Imaging: Results Review Statement: No pertinent imaging studies reviewed.    VTE  "Prophylaxis: in place.    Code Status:   Level 1 - Full Code    Patient Centered Rounds: I have performed bedside rounds with nursing staff today.    Mobility:   Basic Mobility Inpatient Raw Score: 19  JH-HLM Goal: 6: Walk 10 steps or more  JH-HLM Achieved: 5: Stand (1 or more minutes)  JH-HLM Goal achieved. Continue to encourage appropriate mobility.    Discussions with Specialists or Other Care Team Provider: GI    Education and Discussions with Family / Patient: Declined update    Total Time Spent on Date of Encounter in care of patient: 35 mins. This time was spent on one or more of the following: performing physical exam; counseling and coordination of care; obtaining or reviewing history; documenting in the medical record; reviewing/ordering tests, medications or procedures; communicating with other healthcare professionals and discussing with patient's family/caregivers.    Current Length of Stay: 2 day(s)    Current Patient Status: Inpatient   Certification Statement: The patient will continue to require additional inpatient hospital stay due to see my assessment and plan.     Subjective:   Patient is seen and examined at bedside.  No new complaints.  Afebrile  All other ROS are negative.    Objective:    Vitals: Blood pressure (!) 82/41, pulse 74, temperature (!) 97.4 °F (36.3 °C), resp. rate 15, height 5' 8\" (1.727 m), weight 68.7 kg (151 lb 8 oz), SpO2 97%.,Body mass index is 23.04 kg/m².  SPO2 RA Rest      Flowsheet Row ED to Hosp-Admission (Current) from 4/19/2025 in [facility] Med Surg Unit   SpO2 97 %   SpO2 Activity At Rest   O2 Device None (Room air)   O2 Flow Rate --          I&O:   Intake/Output Summary (Last 24 hours) at 4/21/2025 0755  Last data filed at 4/21/2025 0540  Gross per 24 hour   Intake 1715.74 ml   Output 500 ml   Net 1215.74 ml       Physical Exam:    General- Alert, lying comfortably in bed. Not in any acute distress.  Neck- Supple, No JVD  CVS- regular, S1 and S2 " normal  Chest- Bilateral Air entry, No rhochi, crackles or wheezing present.  Abdomen- soft, nontender, not distended, no guarding or rigidity, BS+  Extremities-  No pedal edema, No calf tenderness  CNS-   Alert, awake and orientedx3. No focal deficits present.    Invasive Devices       Peripheral Intravenous Line  Duration             Peripheral IV 04/19/25 Left;Ventral (anterior) Forearm 1 day                          Social History  reviewed  Family History   Problem Relation Age of Onset    Hypertension Father     Colon cancer Neg Hx     reviewed    Meds:  Current Facility-Administered Medications   Medication Dose Route Frequency Provider Last Rate Last Admin    escitalopram (LEXAPRO) tablet 10 mg  10 mg Oral Daily Sudhir Mcdonald DO   10 mg at 04/20/25 0850    heparin (porcine) subcutaneous injection 5,000 Units  5,000 Units Subcutaneous Q8H [facility] Sudhir Mcdonald DO   5,000 Units at 04/21/25 0541    multi-electrolyte (Plasmalyte-A/Isolyte-S PH 7.4/Normosol-R) IV bolus 1,000 mL  1,000 mL Intravenous Once Sudhir Mcdonald DO 0 mL/hr at 04/19/25 1833 Restarted at 04/19/25 2359    multi-electrolyte (Plasmalyte-A/Isolyte-S PH 7.4/Normosol-R) IV solution  75 mL/hr Intravenous Continuous Koki K Josh, DO 75 mL/hr at 04/21/25 0540 75 mL/hr at 04/21/25 0540    pantoprazole (PROTONIX) injection 40 mg  40 mg Intravenous Q12H [facility] Radha Srivastava MD   40 mg at 04/20/25 2136    [Held by provider] spironolactone (ALDACTONE) tablet 50 mg  50 mg Oral Daily Sudhir Mcdonald DO            Medications Prior to Admission:     Cholecalciferol (Vitamin D3) 1.25 MG (40757 UT) CAPS    escitalopram (LEXAPRO) 10 mg tablet    pantoprazole (PROTONIX) 40 mg tablet    rosuvastatin (CRESTOR) 10 MG tablet    spironolactone (ALDACTONE) 50 mg tablet    potassium chloride (Klor-Con M20) 20 mEq tablet    Labs:  Results from last 7 days   Lab Units 04/21/25  0032 04/20/25  1745 04/20/25  0527 04/19/25  1038   WBC Thousand/uL  --   --  17.75* 22.88*    HEMOGLOBIN g/dL 7.1* 7.9* 6.8* 7.7*   HEMATOCRIT %  --   --  20.3* 23.6*   PLATELETS Thousands/uL  --   --  64* 88*   SEGS PCT %  --   --  70 81*   LYMPHO PCT %  --   --  19 11*   MONO PCT %  --   --  9 7   EOS PCT %  --   --  1 0     Results from last 7 days   Lab Units 04/20/25  0527 04/19/25  1038 04/16/25  1215   POTASSIUM mmol/L 4.0 4.3 4.5   CHLORIDE mmol/L 105 103 102   CO2 mmol/L 19* 17* 17*   BUN mg/dL 16 16 17   CREATININE mg/dL 2.47* 2.73* 2.72*   CALCIUM mg/dL 9.4 9.8 9.6   ALK PHOS U/L 94 111* 131*   ALT U/L 32 39 37   AST U/L 43* 61* 54*     Lab Results   Component Value Date    TROPONINI <0.02 11/11/2018     Results from last 7 days   Lab Units 04/20/25  0527 04/19/25  1203   INR  1.57* 1.60*     Lab Results   Component Value Date    BLOODCX No Growth at 24 hrs. 04/19/2025    BLOODCX No Growth at 24 hrs. 04/19/2025         Imaging:  Results for orders placed during the hospital encounter of 04/19/25    XR chest 1 view portable    Narrative  XR CHEST PORTABLE    INDICATION: 2-week history of weakness, fatigue, and abdominal pain.    COMPARISON: None    FINDINGS:    Clear lungs. No pneumothorax or pleural effusion.    Normal cardiomediastinal silhouette.    Bones are unremarkable for age.    Normal upper abdomen.    Impression  No acute cardiopulmonary disease.        Resident: Filipe Valenzuela I, the attending radiologist, have reviewed the images and agree with the final report above.    Workstation performed: [id number]    No results found for this or any previous visit.      Last 24 Hours Medication List:   Current Facility-Administered Medications   Medication Dose Route Frequency Provider Last Rate    escitalopram  10 mg Oral Daily Sudhir Mcdonald DO      heparin (porcine)  5,000 Units Subcutaneous Q8H [facility] Sudhir Mcdonald DO      multi-electrolyte  1,000 mL Intravenous Once Sudhir Mcdonald DO 0 mL (04/19/25 1833)    multi-electrolyte  75 mL/hr Intravenous Continuous Kokigena Moreno DO 75 mL/hr  (04/21/25 0540)    pantoprazole  40 mg Intravenous Q12H [facility] Radha Srivastava MD      [Held by provider] spironolactone  50 mg Oral Daily Sudhir Mcdonald DO          Today, Patient Was Seen By: Alexis Alvarez MD    ** Please Note: Dictation voice to text software may have been used in the creation of this document. **

## 2025-04-21 NOTE — UTILIZATION REVIEW
NOTIFICATION OF INPATIENT ADMISSION   AUTHORIZATION REQUEST   SERVICING FACILITY:   [place] [address]  Tax ID: [id number]  NPI: [id number] ATTENDING PROVIDER:  Attending Name and NPI#: Alexis Alvarez Md [[id number]]  Address: [address] [place] [address]  Phone: [phone]   ADMISSION INFORMATION:  Place of Service: Inpatient [facility]  Place of Service Code: 21  Inpatient Admission Date/Time: 4/19/25  3:54 PM  Discharge Date/Time: No discharge date for patient encounter.  Admitting Diagnosis Code/Description:  Dehydration [E86.0]  Kidney stone [N20.0]  Cirrhosis (HCC) [K74.60]  Hyponatremia [E87.1]  Weakness [R53.1]  Anemia [D64.9]  Chronic renal failure [N18.9]  Pancreatic mass [K86.89]  Generalized weakness [R53.1]  Abdominal ascites [R18.8]     UTILIZATION REVIEW CONTACT:  Kaitlynn Perez, Utilization   Network Utilization Review Department  Phone: [phone]  Fax: [phone]  Email: [email or web address]  Contact for approvals/pending authorizations, clinical reviews, and discharge.     PHYSICIAN ADVISORY SERVICES:  Medical Necessity Denial & Vcfu-by-Ezor Review  Phone: [phone]  Fax: [phone]  Email: [email or web address]     DISCHARGE SUPPORT TEAM:  For Patients Discharge Needs & Updates  Phone: [phone] opt. 2 Fax: [phone]  Email: [email or web address]

## 2025-04-21 NOTE — ASSESSMENT & PLAN NOTE
US right upper quadrant, 4/10/2025: Cirrhosis with severe hepatic steatosis and severe hepatomegaly with sequela of portal hypertension including moderate ascites   Patient due for inpatient paracentesis with IR  Recommend albumin for high-volume paracentesis

## 2025-04-21 NOTE — OCCUPATIONAL THERAPY NOTE
Occupational Therapy Screen Note        Patient Name: Sudhir Bach  Today's Date: 4/21/2025 04/21/25 6542   Note Type   Note type Screen   Additional Comments OT orders received and chart review completed. Spoke with RN who was observed ambulating with patient in hallway. Pt ambulated IND without use of AD. RN confirmed. Pt with no acute OT needs. Will DC patient fro OT caseload at this time. Please re-consult if change in functional status arises     Jacklyn Gao, OTR/L

## 2025-04-21 NOTE — ASSESSMENT & PLAN NOTE
Patient met SIRS criteria through leukocytosis and tachypnea  She denied any fever, burning urination  UA showed 2-4 white cell count  No signs of infection  Will continue to monitor off antibiotic  Blood cultures are negative to date

## 2025-04-21 NOTE — ASSESSMENT & PLAN NOTE
Patient with past medical history of idiopathic pancreatitis    CT abdomen/pelvis-  6.5 cm cystic mass in the pancreatic body/tail, indeterminate. Additional 11 mm pancreatic head cyst.     Lipase-normal  MRI/MRCP abdomen done.  Awaiting results.

## 2025-04-21 NOTE — ASSESSMENT & PLAN NOTE
Lab Results   Component Value Date    EGFR 28 04/20/2025    EGFR 25 04/19/2025    EGFR 25 04/16/2025    CREATININE 2.47 (H) 04/20/2025    CREATININE 2.73 (H) 04/19/2025    CREATININE 2.72 (H) 04/16/2025   Avoid nephrotoxic agents  Follow-up on creatinine  Urology following, appreciate recommendations  Urinary retention protocol  Creatinine is 2.47.  Nephrology follow-up.  Continue to hold spironolactone

## 2025-04-21 NOTE — PHYSICAL THERAPY NOTE
PHYSICAL THERAPY SCREEN NOTE      Patient Name: Sudhir Bach  Today's Date: 4/21/2025 04/21/25 1300   Note Type   Note type Screen   Additional Comments PT orders received and chart review completed. Spoke with RN who was observed ambulating with pt in the hallway. Pt ambulated independently without a device and without gait deviation. RN confirmed pt's abilities. Pt has no acute PT needs and is D/C from PT services. Please re-consult for change in status or if needs arise.     Johnathon Weaver, PT

## 2025-04-22 ENCOUNTER — APPOINTMENT (INPATIENT)
Dept: INTERVENTIONAL RADIOLOGY/VASCULAR | Facility: HOSPITAL | Age: 55
DRG: 432 | End: 2025-04-22
Attending: INTERNAL MEDICINE

## 2025-04-22 PROBLEM — N18.9 CHRONIC RENAL IMPAIRMENT: Status: ACTIVE | Noted: 2025-04-22

## 2025-04-22 LAB
A1AT PHENOTYP SERPL IFE: ABNORMAL
A1AT SERPL-MCNC: 240 MG/DL (ref 101–187)
ALBUMIN FLD-MCNC: <1.5 G/DL
ALBUMIN SERPL BCG-MCNC: 2.1 G/DL (ref 3.5–5)
ALP SERPL-CCNC: 106 U/L (ref 34–104)
ALT SERPL W P-5'-P-CCNC: 34 U/L (ref 7–52)
ANION GAP SERPL CALCULATED.3IONS-SCNC: 4 MMOL/L (ref 4–13)
APPEARANCE FLD: CLEAR
AST SERPL W P-5'-P-CCNC: 44 U/L (ref 13–39)
ATRIAL RATE: 79 BPM
BASOPHILS # BLD AUTO: 0.04 THOUSANDS/ÂΜL (ref 0–0.1)
BASOPHILS NFR BLD AUTO: 0 % (ref 0–1)
BILIRUB SERPL-MCNC: 3.48 MG/DL (ref 0.2–1)
BUN SERPL-MCNC: 15 MG/DL (ref 5–25)
CALCIUM ALBUM COR SERPL-MCNC: 11 MG/DL (ref 8.3–10.1)
CALCIUM SERPL-MCNC: 9.5 MG/DL (ref 8.4–10.2)
CHLORIDE SERPL-SCNC: 105 MMOL/L (ref 96–108)
CO2 SERPL-SCNC: 21 MMOL/L (ref 21–32)
COLOR FLD: YELLOW
CREAT SERPL-MCNC: 2.4 MG/DL (ref 0.6–1.3)
EOSINOPHIL # BLD AUTO: 0.05 THOUSAND/ÂΜL (ref 0–0.61)
EOSINOPHIL NFR BLD AUTO: 0 % (ref 0–6)
ERYTHROCYTE [DISTWIDTH] IN BLOOD BY AUTOMATED COUNT: 19.6 % (ref 11.6–15.1)
GFR SERPL CREATININE-BSD FRML MDRD: 29 ML/MIN/1.73SQ M
GLUCOSE FLD-MCNC: 149 MG/DL
GLUCOSE SERPL-MCNC: 147 MG/DL (ref 65–140)
HCT VFR BLD AUTO: 23.4 % (ref 36.5–49.3)
HGB BLD-MCNC: 7.3 G/DL (ref 12–17)
HGB BLD-MCNC: 7.7 G/DL (ref 12–17)
HISTIOCYTES NFR FLD: 23 %
IMM GRANULOCYTES # BLD AUTO: 0.09 THOUSAND/UL (ref 0–0.2)
IMM GRANULOCYTES NFR BLD AUTO: 1 % (ref 0–2)
KAPPA LC FREE SER-MCNC: 306.7 MG/L (ref 3.3–19.4)
KAPPA LC FREE/LAMBDA FREE SER: 1.21 {RATIO} (ref 0.26–1.65)
LAMBDA LC FREE SERPL-MCNC: 252.6 MG/L (ref 5.7–26.3)
LDH FLD L TO P-CCNC: <25 U/L
LYMPHOCYTES # BLD AUTO: 2.45 THOUSANDS/ÂΜL (ref 0.6–4.47)
LYMPHOCYTES NFR BLD AUTO: 16 % (ref 14–44)
LYMPHOCYTES NFR BLD AUTO: 7 %
MCH RBC QN AUTO: 34.7 PG (ref 26.8–34.3)
MCHC RBC AUTO-ENTMCNC: 32.9 G/DL (ref 31.4–37.4)
MCV RBC AUTO: 105 FL (ref 82–98)
MONOCYTES # BLD AUTO: 1.19 THOUSAND/ÂΜL (ref 0.17–1.22)
MONOCYTES NFR BLD AUTO: 30 %
MONOCYTES NFR BLD AUTO: 8 % (ref 4–12)
NEUTROPHILS # BLD AUTO: 11.42 THOUSANDS/ÂΜL (ref 1.85–7.62)
NEUTS SEG NFR BLD AUTO: 40 %
NEUTS SEG NFR BLD AUTO: 75 % (ref 43–75)
NRBC BLD AUTO-RTO: 0 /100 WBCS
P AXIS: 42 DEGREES
PLATELET # BLD AUTO: 64 THOUSANDS/UL (ref 149–390)
PMV BLD AUTO: 11.1 FL (ref 8.9–12.7)
POTASSIUM SERPL-SCNC: 3.9 MMOL/L (ref 3.5–5.3)
PR INTERVAL: 158 MS
PROT FLD-MCNC: <3 G/DL
PROT SERPL-MCNC: 7.4 G/DL (ref 6.4–8.4)
QRS AXIS: 41 DEGREES
QRSD INTERVAL: 86 MS
QT INTERVAL: 428 MS
QTC INTERVAL: 490 MS
RBC # BLD AUTO: 2.22 MILLION/UL (ref 3.88–5.62)
SITE: NORMAL
SODIUM SERPL-SCNC: 130 MMOL/L (ref 135–147)
T WAVE AXIS: 33 DEGREES
TOTAL CELLS COUNTED SPEC: 100
TOTAL PROTEIN FLUID: 1.1 G/DL
VENTRICULAR RATE: 79 BPM
WBC # BLD AUTO: 15.24 THOUSAND/UL (ref 4.31–10.16)
WBC # FLD MANUAL: 388 /UL

## 2025-04-22 PROCEDURE — 82042 OTHER SOURCE ALBUMIN QUAN EA: CPT | Performed by: STUDENT IN AN ORGANIZED HEALTH CARE EDUCATION/TRAINING PROGRAM

## 2025-04-22 PROCEDURE — 99232 SBSQ HOSP IP/OBS MODERATE 35: CPT | Performed by: INTERNAL MEDICINE

## 2025-04-22 PROCEDURE — 82525 ASSAY OF COPPER: CPT | Performed by: PHYSICIAN ASSISTANT

## 2025-04-22 PROCEDURE — 88305 TISSUE EXAM BY PATHOLOGIST: CPT | Performed by: STUDENT IN AN ORGANIZED HEALTH CARE EDUCATION/TRAINING PROGRAM

## 2025-04-22 PROCEDURE — 0W9G3ZZ DRAINAGE OF PERITONEAL CAVITY, PERCUTANEOUS APPROACH: ICD-10-PCS | Performed by: RADIOLOGY

## 2025-04-22 PROCEDURE — 87070 CULTURE OTHR SPECIMN AEROBIC: CPT | Performed by: STUDENT IN AN ORGANIZED HEALTH CARE EDUCATION/TRAINING PROGRAM

## 2025-04-22 PROCEDURE — 49083 ABD PARACENTESIS W/IMAGING: CPT

## 2025-04-22 PROCEDURE — 83615 LACTATE (LD) (LDH) ENZYME: CPT | Performed by: STUDENT IN AN ORGANIZED HEALTH CARE EDUCATION/TRAINING PROGRAM

## 2025-04-22 PROCEDURE — 82945 GLUCOSE OTHER FLUID: CPT | Performed by: STUDENT IN AN ORGANIZED HEALTH CARE EDUCATION/TRAINING PROGRAM

## 2025-04-22 PROCEDURE — 87205 SMEAR GRAM STAIN: CPT | Performed by: STUDENT IN AN ORGANIZED HEALTH CARE EDUCATION/TRAINING PROGRAM

## 2025-04-22 PROCEDURE — 93010 ELECTROCARDIOGRAM REPORT: CPT | Performed by: INTERNAL MEDICINE

## 2025-04-22 PROCEDURE — 89051 BODY FLUID CELL COUNT: CPT | Performed by: STUDENT IN AN ORGANIZED HEALTH CARE EDUCATION/TRAINING PROGRAM

## 2025-04-22 PROCEDURE — 84157 ASSAY OF PROTEIN OTHER: CPT | Performed by: STUDENT IN AN ORGANIZED HEALTH CARE EDUCATION/TRAINING PROGRAM

## 2025-04-22 PROCEDURE — 88112 CYTOPATH CELL ENHANCE TECH: CPT | Performed by: STUDENT IN AN ORGANIZED HEALTH CARE EDUCATION/TRAINING PROGRAM

## 2025-04-22 PROCEDURE — 85025 COMPLETE CBC W/AUTO DIFF WBC: CPT | Performed by: INTERNAL MEDICINE

## 2025-04-22 PROCEDURE — 82570 ASSAY OF URINE CREATININE: CPT | Performed by: PHYSICIAN ASSISTANT

## 2025-04-22 PROCEDURE — 80053 COMPREHEN METABOLIC PANEL: CPT | Performed by: INTERNAL MEDICINE

## 2025-04-22 PROCEDURE — 85018 HEMOGLOBIN: CPT | Performed by: INTERNAL MEDICINE

## 2025-04-22 RX ADMIN — HEPARIN SODIUM 5000 UNITS: 5000 INJECTION INTRAVENOUS; SUBCUTANEOUS at 21:58

## 2025-04-22 RX ADMIN — PANTOPRAZOLE SODIUM 40 MG: 40 INJECTION, POWDER, FOR SOLUTION INTRAVENOUS at 10:16

## 2025-04-22 RX ADMIN — PANTOPRAZOLE SODIUM 40 MG: 40 INJECTION, POWDER, FOR SOLUTION INTRAVENOUS at 21:58

## 2025-04-22 RX ADMIN — HEPARIN SODIUM 5000 UNITS: 5000 INJECTION INTRAVENOUS; SUBCUTANEOUS at 15:16

## 2025-04-22 RX ADMIN — ESCITALOPRAM OXALATE 10 MG: 10 TABLET ORAL at 10:15

## 2025-04-22 NOTE — ASSESSMENT & PLAN NOTE
Lab Results   Component Value Date    EGFR 30 04/21/2025    EGFR 28 04/20/2025    EGFR 25 04/19/2025    CREATININE 2.30 (H) 04/21/2025    CREATININE 2.47 (H) 04/20/2025    CREATININE 2.73 (H) 04/19/2025   Avoid nephrotoxic agents  Follow-up on creatinine  Urology following, appreciate recommendations  Urinary retention protocol  Creatinine is 2.47.  Nephrology follow-up.  Continue to hold spironolactone

## 2025-04-22 NOTE — ASSESSMENT & PLAN NOTE
Vit D > 120  Has hypercalcemia as well on admission  Per [facility] Home meds, patient on Cholecalciferol 50,000 weekly.  Hold vit D and calcium supplements for now  Monitor levels   Can take time to improve off treatment

## 2025-04-22 NOTE — ASSESSMENT & PLAN NOTE
BUD on CKD,Cr  2.73> 2.3 compared to baseline 1.8-2.0. Patient recently initiated on spironolactone.    - Agree with holding spironolactone  - Appreciate nephrology recommendations  -monitor and correct Cr abd electrolytes

## 2025-04-22 NOTE — PROGRESS NOTES
Progress Note - Gastroenterology   Name: Sudhir Bach 55 y.o. male I MRN: [id number]  Unit/Bed#: -01 I Date of Admission: 4/19/2025   Date of Service: 4/22/2025 I Hospital Day: 3    Assessment & Plan  Other cirrhosis of liver (HCC)  Newly diagnosed. There is evidence of cirrhosis based on labs, imaging, and physical exam. Bilirubin 2.96. Albumin 1.9. INR 1.57. Platelets 64,000. RUQ US 4/10/2025 shows cirrhosis with severe steatosis, hepatomegaly, sequela of portal HTN including moderate ascites. CT A/P 4/19 again shows evidence of cirrhosis, moderate to large ascites. No significant alcohol use in the past. Extensive liver serologic workup significant for low ceruloplasmin suspicious for Roque's disease and elevated ferritin 1365, concern for hemochromatosis. Autoimmune and viral serologies negative. MELD 3.0 = 29 on 4/21, today's labs pending.    4/22: for paracentesis today, MRI with pancreatic cysts up to 5.7 cm, PDD, rec EUS as outpt    - Monitor MELD labs daily  - await HFE gene mutation, alpha 1 antitrypsin level  -await 24 hr urine copper r/o Roque's  -Dr. Mosqueda reviewed with Dr. Mota yesterday.  Patient with history of heavy alcohol and prior admissions for this, pt denies.  Reports was drinking socially up until 6 months ago  - Recommend close outpatient GI follow-up    Ascites:  -Recommend diagnostic and therapeutic paracentesis to rule out SBP and confirm ascites is secondary to cirrhosis/portal HTN  -Recommend low sodium diet  -Hold spironolactone due to BUD    HE:  No overt s/sx at this time, monitor mental status    Screening for esophageal varices:  No evidence of varices on EGD from December 2024  - He will eventually need repeat EGD due to new hepatic decompensation    HCC screening  No evidence of hepatoma on RUQ ultrasound 4/10  BUD (acute kidney injury) (HCC)  BUD on CKD,Cr  2.73> 2.3 compared to baseline 1.8-2.0. Patient recently initiated on spironolactone.    - Agree with holding  spironolactone  - Appreciate nephrology recommendations  -monitor and correct Cr abd electrolytes  Anemia  Hemoglobin 7.7 on admission, down from 12.5 in September 2023. MCV high 114. B12 and folate WNL. Iron 43 and ferritin 1365. Recent EGD and colonoscopy from December 2024 significant for gastritis and mild Schatzki's ring, colon polyps, diverticulosis, hemorrhoids. No current signs of overt GI bleeding.    - Monitor H&H and transfuse to keep hemoglobin greater than 7 (received 1 u 4/20 for Hg 6.8), Hg stable 7.6  - Monitor for GI bleeding  - Patient will need repeat EGD to screen for esophageal varices given new decompensation  Pancreatic cyst  Patient with history of idiopathic pancreatitis. No evidence of pancreatitis at present time. CT shows apparent cystic mass in the pancreatic body/tail measuring approximately 6.5 x 4.3 cm and another 11 mm cyst along the medial pancreatic head. Suspect cysts may be pseudocysts related to prior episode of pancreatitis.    - MRI ABD:  5.7 and 2.2 cm panc cysts, possible pseudocysts  vs indeterminate, PD dilated and communicates with cyst, rec EUS as outpt      Chronic renal failure  Lab Results   Component Value Date    EGFR 30 04/21/2025    EGFR 28 04/20/2025    EGFR 25 04/19/2025    CREATININE 2.30 (H) 04/21/2025    CREATININE 2.47 (H) 04/20/2025    CREATININE 2.73 (H) 04/19/2025           Subjective   Patient seen and examined.  Has abdominal distention.  For paracentesis at 11:30 AM today.  Tolerating diet without vomiting or abdominal pain.  Had BM. MRI with large panc cysts and PD dilation.    Objective :  Temp:  [95.8 °F (35.4 °C)-98.2 °F (36.8 °C)] 97.5 °F (36.4 °C)  HR:  [77-85] 82  BP: ()/(45-85) 99/55  Resp:  [12-20] 12  SpO2:  [93 %-97 %] 95 %  O2 Device: None (Room air)    Physical Exam  Constitutional: Well-developed, no acute distress  HEENT: normocephalic, mucous membranes moist.+ scleral icterus  Neck: Supple  Skin: warm and dry  Respiratory: Lungs  are clear to auscultation B/L.  Cardiovascular: Heart is regular rate and rhythm.  Gastrointestinal: Soft, nontender, +distended with normal active bowel sounds.  No masses, guarding, rebound.   Rectal Exam: Deferred.  Extremities: No edema.  Neurologic: Nonfocal. A & O ×3. No asterixis  Psychiatric: Normal affect.      Lab Results: I have reviewed the following results:CBC/BMP:   .     04/21/25 2015   HGB 7.6*    , Creatinine Clearance: Estimated Creatinine Clearance: 35.1 mL/min (A) (by C-G formula based on SCr of 2.3 mg/dL (H))., LFTs: No new results in last 24 hours. , PTT/INR:No new results in last 24 hours.     Imaging Results Review: I reviewed radiology reports from this admission including: MRI abdomen/MRCP.

## 2025-04-22 NOTE — PLAN OF CARE
Problem: Potential for Falls  Goal: Patient will remain free of falls  Description: INTERVENTIONS:- Educate patient/family on patient safety including physical limitations- Instruct patient to call for assistance with activity - Consult OT/PT to assist with strengthening/mobility - Keep Call bell within reach- Keep bed low and locked with side rails adjusted as appropriate- Keep care items and personal belongings within reach- Initiate and maintain comfort rounds- Make Fall Risk Sign visible to staff- Apply yellow socks and bracelet for high fall risk patients- Consider moving patient to room near nurses station  INTERVENTIONS:- Educate patient/family on patient safety including physical limitations- Instruct patient to call for assistance with activity - Consult OT/PT to assist with strengthening/mobility - Keep Call bell within reach- Keep bed low and locked with side rails adjusted as appropriate- Keep care items and personal belongings within reach- Initiate and maintain comfort rounds- Make Fall Risk Sign visible to staff- Obtain necessary fall risk management equipment: Apply yellow socks and bracelet for high fall risk patients- Consider moving patient to room near nurses station  Outcome: Progressing     Problem: PAIN - ADULT  Goal: Verbalizes/displays adequate comfort level or baseline comfort level  Description: Interventions:- Encourage patient to monitor pain and request assistance- Assess pain using appropriate pain scale- Administer analgesics based on type and severity of pain and evaluate response- Implement non-pharmacological measures as appropriate and evaluate response- Consider cultural and social influences on pain and pain management- Notify physician/advanced practitioner if interventions unsuccessful or patient reports new pain  Interventions:- Encourage patient to monitor pain and request assistance- Assess pain using appropriate pain scale- Administer analgesics based on type and  severity of pain and evaluate response- Implement non-pharmacological measures as appropriate and evaluate response- Consider cultural and social influences on pain and pain management- Notify physician/advanced practitioner if interventions unsuccessful or patient reports new pain  Outcome: Progressing     Problem: DISCHARGE PLANNING  Goal: Discharge to home or other facility with appropriate resources  Description: INTERVENTIONS:- Identify barriers to discharge w/patient and caregiver- Arrange for needed discharge resources and transportation as appropriate- Identify discharge learning needs (meds, wound care, etc.)- Arrange for interpretive services to assist at discharge as needed- Refer to Case Management Department for coordinating discharge planning if the patient needs post-hospital services based on physician/advanced practitioner order or complex needs related to functional status, cognitive ability, or social support system  INTERVENTIONS:- Identify barriers to discharge w/patient and caregiver- Arrange for needed discharge resources and transportation as appropriate- Identify discharge learning needs (meds, wound care, etc.)- Arrange for interpretive services to assist at discharge as needed- Refer to Case Management Department for coordinating discharge planning if the patient needs post-hospital services based on physician/advanced practitioner order or complex needs related to functional status, cognitive ability, or social support system  Outcome: Progressing     Problem: Knowledge Deficit  Goal: Patient/family/caregiver demonstrates understanding of disease process, treatment plan, medications, and discharge instructions  Description: Complete learning assessment and assess knowledge base.Interventions:- Provide teaching at level of understanding- Provide teaching via preferred learning methods  Complete learning assessment and assess knowledge base.Interventions:- Provide teaching at level of  understanding- Provide teaching via preferred learning methods  Outcome: Progressing     Problem: INFECTION - ADULT  Goal: Absence or prevention of progression during hospitalization  Description: INTERVENTIONS:- Assess and monitor for signs and symptoms of infection- Monitor lab/diagnostic results- Monitor all insertion sites, i.e. indwelling lines, tubes, and drains- Monitor endotracheal if appropriate and nasal secretions for changes in amount and color- [place] appropriate cooling/warming therapies per order- Administer medications as ordered- Instruct and encourage patient and family to use good hand hygiene technique- Identify and instruct in appropriate isolation precautions for identified infection/condition  Outcome: Progressing  Goal: Absence of fever/infection during neutropenic period  Description: INTERVENTIONS:- Monitor WBC  Outcome: Progressing     Problem: SAFETY ADULT  Goal: Patient will remain free of falls  Description: INTERVENTIONS:- Educate patient/family on patient safety including physical limitations- Instruct patient to call for assistance with activity - Consult OT/PT to assist with strengthening/mobility - Keep Call bell within reach- Keep bed low and locked with side rails adjusted as appropriate- Keep care items and personal belongings within reach- Initiate and maintain comfort rounds- Make Fall Risk Sign visible to staff- Offer Toileting every 2 Hours, in advance of need- Obtain necessary fall risk management equipment: Apply yellow socks and bracelet for high fall risk patients- Consider moving patient to room near nurses station  INTERVENTIONS:- Educate patient/family on patient safety including physical limitations- Instruct patient to call for assistance with activity - Consult OT/PT to assist with strengthening/mobility - Keep Call bell within reach- Keep bed low and locked with side rails adjusted as appropriate- Keep care items and personal belongings within reach- Initiate  and maintain comfort rounds- Make Fall Risk Sign visible to staff- Offer Toileting every 2 Hours, in advance of need- Obtain necessary fall risk management equipment:- Apply yellow socks and bracelet for high fall risk patients- Consider moving patient to room near nurses station  Outcome: Progressing  Goal: Maintain or return to baseline ADL function  Description: INTERVENTIONS:-  Assess patient's ability to carry out ADLs; assess patient's baseline for ADL function and identify physical deficits which impact ability to perform ADLs (bathing, care of mouth/teeth, toileting, grooming, dressing, etc.)- Assess/evaluate cause of self-care deficits - Assess range of motion- Assess patient's mobility; develop plan if impaired- Assess patient's need for assistive devices and provide as appropriate- Encourage maximum independence but intervene and supervise when necessary- Involve family in performance of ADLs- Assess for home care needs following discharge - Consider OT consult to assist with ADL evaluation and planning for discharge- Provide patient education as appropriate  Outcome: Progressing  Goal: Maintains/Returns to pre admission functional level  Description: INTERVENTIONS:- Perform AM-PAC 6 Click Basic Mobility/ Daily Activity assessment daily.- Set and communicate daily mobility goal to care team and patient/family/caregiver. - Collaborate with rehabilitation services on mobility goals if consulted- Perform Range of Motion 2 times a day.- Reposition patient every 4 hours.- Dangle patient 2 times a day- Stand patient 2 times a day- Ambulate patient 2 times a day- Out of bed to chair 2 times a day - Out of bed for meals 2 times a day- Out of bed for toileting- Record patient progress and toleration of activity level   Outcome: Progressing     Problem: Nutrition/Hydration-ADULT  Goal: Nutrient/Hydration intake appropriate for improving, restoring or maintaining nutritional needs  Description: Monitor and assess  patient's nutrition/hydration status for malnutrition. Collaborate with interdisciplinary team and initiate plan and interventions as ordered.  Monitor patient's weight and dietary intake as ordered or per policy. Utilize nutrition screening tool and intervene as necessary. Determine patient's food preferences and provide high-protein, high-caloric foods as appropriate. INTERVENTIONS:- Monitor oral intake, urinary output, labs, and treatment plans- Assess nutrition and hydration status and recommend course of action- Evaluate amount of meals eaten- Assist patient with eating if necessary - Allow adequate time for meals- Recommend/ encourage appropriate diets, oral nutritional supplements, and vitamin/mineral supplements- Order, calculate, and assess calorie counts as needed- Recommend, monitor, and adjust tube feedings and TPN/PPN based on assessed needs- Assess need for intravenous fluids- Provide specific nutrition/hydration education as appropriate- Include patient/family/caregiver in decisions related to nutrition  Outcome: Progressing     Problem: Prexisting or High Potential for Compromised Skin Integrity  Goal: Skin integrity is maintained or improved  Description: INTERVENTIONS:- Identify patients at risk for skin breakdown- Assess and monitor skin integrity- Assess and monitor nutrition and hydration status- Monitor labs - Assess for incontinence - Turn and reposition patient- Assist with mobility/ambulation- Relieve pressure over bony prominences- Avoid friction and shearing- Provide appropriate hygiene as needed including keeping skin clean and dry- Evaluate need for skin moisturizer/barrier cream- Collaborate with interdisciplinary team - Patient/family teaching- Consider wound care consult   Outcome: Progressing     Problem: GASTROINTESTINAL - ADULT  Goal: Minimal or absence of nausea and/or vomiting  Description: INTERVENTIONS:- Administer IV fluids if ordered to ensure adequate hydration- Maintain NPO  status until nausea and vomiting are resolved- Nasogastric tube if ordered- Administer ordered antiemetic medications as needed- Provide nonpharmacologic comfort measures as appropriate- Advance diet as tolerated, if ordered- Consider nutrition services referral to assist patient with adequate nutrition and appropriate food choices  Outcome: Progressing  Goal: Maintains or returns to baseline bowel function  Description: INTERVENTIONS:- Assess bowel function- Encourage oral fluids to ensure adequate hydration- Administer IV fluids if ordered to ensure adequate hydration- Administer ordered medications as needed- Encourage mobilization and activity- Consider nutritional services referral to assist patient with adequate nutrition and appropriate food choices  Outcome: Progressing  Goal: Maintains adequate nutritional intake  Description: INTERVENTIONS:- Monitor percentage of each meal consumed- Identify factors contributing to decreased intake, treat as appropriate- Assist with meals as needed- Monitor I&O, weight, and lab values if indicated- Obtain nutrition services referral as needed  Outcome: Progressing  Goal: Establish and maintain optimal ostomy function  Description: INTERVENTIONS:- Assess bowel function- Encourage oral fluids to ensure adequate hydration- Administer IV fluids if ordered to ensure adequate hydration - Administer ordered medications as needed- Encourage mobilization and activity- Nutrition services referral to assist patient with appropriate food choices- Assess stoma site- Consider wound care consult   Outcome: Progressing  Goal: Oral mucous membranes remain intact  Description: INTERVENTIONS- Assess oral mucosa and hygiene practices- Implement preventative oral hygiene regimen- Implement oral medicated treatments as ordered- Initiate Nutrition services referral as needed  Outcome: Progressing     Problem: METABOLIC, FLUID AND ELECTROLYTES - ADULT  Goal: Electrolytes maintained within normal  limits  Description: INTERVENTIONS:- Monitor labs and assess patient for signs and symptoms of electrolyte imbalances- Administer electrolyte replacement as ordered- Monitor response to electrolyte replacements, including repeat lab results as appropriate- Instruct patient on fluid and nutrition as appropriate  Outcome: Progressing  Goal: Fluid balance maintained  Description: INTERVENTIONS:- Monitor labs - Monitor I/O and WT- Instruct patient on fluid and nutrition as appropriate- Assess for signs & symptoms of volume excess or deficit  Outcome: Progressing  Goal: Glucose maintained within target range  Description: INTERVENTIONS:- Monitor Blood Glucose as ordered- Assess for signs and symptoms of hyperglycemia and hypoglycemia- Administer ordered medications to maintain glucose within target range- Assess nutritional intake and initiate nutrition service referral as needed  Outcome: Progressing     Problem: HEMATOLOGIC - ADULT  Goal: Maintains hematologic stability  Description: INTERVENTIONS- Assess for signs and symptoms of bleeding or hemorrhage- Monitor labs- Administer supportive blood products/factors as ordered and appropriate  Outcome: Progressing

## 2025-04-22 NOTE — ASSESSMENT & PLAN NOTE
Etiology: likely d/t poor oral intake + diuretic use + hypovolemic along with component of SIADH  Admission sodium was 128  Current sodium 130<131  Off IV fluids  Remains on fluid restriction at 1.8 L   Workup:   Urine Osmo 420-,urine sodium 69-suggestive of SIADH  serum osm-289  TSH 3.388, random cortisol at 5: 45 pm 9.6  Uric acid 4.7  Plan:  Continue holding diuretic for now  Patient for paracentesis today-under sodium for volume removal  Continue FR 1.8 liters  Check BMP in am

## 2025-04-22 NOTE — ASSESSMENT & PLAN NOTE
Etiology: multifactorial:  Prerenal given  poor oral intake + hypovolemia/dehydration leading to ATN  Admission creatinine 2.73  Baseline creatinine 1.8-2.0 since 2024.  Prior to that 3.0-4.0 since 2023  Off IV fluids  creatinine 2.30 yesterday  No labs obtain this am yet-ordered  Not on ace/arb outpateint  Spironolactone currently on hold  Workup:  UA: Trace blood and leukocytes, +1 protein, moderate epithelial cells, 20-30 hyaline casts, 3-5 granular casts, innumerable bacteria and mucus threads  Urine electrolytes: Urine chloride 88, urine creatinine 129, urine sodium 69, urine urea 367   FENa: 1.0% -indeterminate.  Can be seen with either prerenal or intrinsic BUD  FEUrea: 43.9% - >35% suggestive of intrinsic A  PCXR: Negative  4/19/2025 CT A/P wo: 5 mm nonobstructing calculus left mid kidney. 4 mm calculus left UPJ, without obstruction.   Plan:  Avoid all NSAIDs, nephrotoxic agents, as well as IV contrast  Avoid hypotension and any perturbations in blood pressure  Closely monitor I&O, daily weight and labs  Encouraged lab work  Once reviewed further recommendations will be forthcoming

## 2025-04-22 NOTE — BRIEF OP NOTE (RAD/CATH)
INTERVENTIONAL RADIOLOGY PROCEDURE NOTE    Date: 4/22/2025    Procedure: Paracentesis  Procedure Summary       Date:  Room / Location:     Anesthesia Start:  Anesthesia Stop:     Procedure:  Diagnosis:     Scheduled Providers:  Responsible Provider:     Anesthesia Type: Not recorded ASA Status: Not recorded            Preoperative diagnosis:   1. Generalized weakness    2. Hyponatremia    3. Anemia    4. Cirrhosis (HCC)    5. Dehydration    6. Abdominal ascites    7. Chronic renal failure    8. Pancreatic mass    9. Kidney stone    10. Cirrhosis of liver with ascites, unspecified hepatic cirrhosis type  (HCC)         Postoperative diagnosis: Same.    Surgeon: Shaq Austin MD     Assistant: None. No qualified resident was available.    Blood loss: None    Specimens: Ascites fluid     Findings: RUQ approach paracentesis and 2820 ml of clear yellow fluid aspirated.    Complications: None immediate.    Anesthesia: local

## 2025-04-22 NOTE — ASSESSMENT & PLAN NOTE
Patient was past medical history of transaminitis  Patient presents with generalized weakness and lethargy  Patient follow-up with gastroenterology  Was concern for cirrhosis probably due to [place] due to new onset ascites and abnormal liver function test as patient denied any alcohol intake  Patient was seen recently by gastroenterology and autoimmune, viral serology, ceruloplasmin, iron panel were ordered  Chronic viral serology are nonreactive  Ceruloplasmin is 9.2  Patient recent labs showed worsening kidney function with creatinine of 2.73, AST 61, sodium 128, T. bili 3.77, INR 1.4  Patient was referred to the ED for extensive workup  Patient presented with weakness  GI recommended starting low-dose of spironolactone  Plan  Consult gastroenterology appreciate recommendations  Will hold spironolactone in the setting of BUD  Trend MELD labs  Patient underwent paracentesis with IR and 2820 cc of clear yellow fluid drained  Follow-up culture results  GI planning for liver biopsy   24-hour urine collection for copper to rule out Roque's  Await HFE gene mutation, alpha 1 antitrypsin level  GI follow-up

## 2025-04-22 NOTE — ASSESSMENT & PLAN NOTE
"Patient with past medical history of idiopathic pancreatitis    CT abdomen/pelvis-  6.5 cm cystic mass in the pancreatic body/tail, indeterminate. Additional 11 mm pancreatic head cyst.     Lipase-normal  MRI/MRCP abdomen-\"No evidence of choledocholithiasis.Cholelithiasis with pericholecystic fluid,   5.7 x 5.2 cm cyst in relation to the distal body/tail of the pancreas communicating with the dorsal pancreatic duct, suggest pancreatic pseudocyst in the context of patient having a previous bout of pancreatitis, other etiologies could include IPMN.Additional 2.2 cm cyst in the head of the pancreas with thick walls, indeterminate may be sequela of prior pancreatitis.The pancreatic lesions can be assessed with the EUS, These are  not described on the previous study performed at outside facility in 2023.Hepatic steatosis\"    "

## 2025-04-22 NOTE — ASSESSMENT & PLAN NOTE
Admission corrected calcium 11.2  Likely in the setting of elevated Vit D and taking supplements along with volume depletion  Most recent corrected calcium 10.9 yesterday  Would further vitamin D and calcium supplements  Recommend checking ionized calcium

## 2025-04-22 NOTE — PROGRESS NOTES
Progress Note - Nephrology   Name: Sudhir Bach 55 y.o. male I MRN: [id number]  Unit/Bed#: -01 I Date of Admission: 4/19/2025   Date of Service: 4/22/2025 I Hospital Day: 3     Assessment & Plan  BUD (acute kidney injury) (HCC)  Etiology: multifactorial:  Prerenal given  poor oral intake + hypovolemia/dehydration leading to ATN  Admission creatinine 2.73  Baseline creatinine 1.8-2.0 since 2024.  Prior to that 3.0-4.0 since 2023  Off IV fluids  creatinine 2.30 yesterday  No labs obtain this am yet-ordered  Not on ace/arb outpateint  Spironolactone currently on hold  Workup:  UA: Trace blood and leukocytes, +1 protein, moderate epithelial cells, 20-30 hyaline casts, 3-5 granular casts, innumerable bacteria and mucus threads  Urine electrolytes: Urine chloride 88, urine creatinine 129, urine sodium 69, urine urea 367   FENa: 1.0% -indeterminate.  Can be seen with either prerenal or intrinsic BUD  FEUrea: 43.9% - >35% suggestive of intrinsic A  PCXR: Negative  4/19/2025 CT A/P wo: 5 mm nonobstructing calculus left mid kidney. 4 mm calculus left UPJ, without obstruction.   Plan:  Avoid all NSAIDs, nephrotoxic agents, as well as IV contrast  Avoid hypotension and any perturbations in blood pressure  Closely monitor I&O, daily weight and labs  Encouraged lab work  Once reviewed further recommendations will be forthcoming  CKD (chronic kidney disease) stage 4, GFR 15-29 ml/min ([facility])  Etiology: suspect prior BUD episode requiring RRT in 2018   Baseline creatinine 1.8-2.0 dating back to 2024; has had fluctuating creatinine 3-4.0 though 2023  Patient follows outpatient with [facility] nephrology. Nancy Yeung  admission creatinine 2.73  Recommend follow up with primary nephrologist as outpatient on discharge  Hyponatremia  Etiology: likely d/t poor oral intake + diuretic use + hypovolemic along with component of SIADH  Admission sodium was 128  Current sodium 130<131  Off IV  fluids  Remains on fluid restriction at 1.8 L   Workup:   Urine Osmo 420-,urine sodium 69-suggestive of SIADH  serum osm-289  TSH 3.388, random cortisol at 5: 45 pm 9.6  Uric acid 4.7  Plan:  Continue holding diuretic for now  Patient for paracentesis today-under sodium for volume removal  Continue FR 1.8 liters  Check BMP in am  Anemia  Since hemoglobin 7.9  S/p 1 unit leukocyte reduced PRBC ordered per primary team  Patient has history of anemia  Monitor hemoglobin keep greater than 7  Management per primary team  Hypercalcemia  Admission corrected calcium 11.2  Likely in the setting of elevated Vit D and taking supplements along with volume depletion  Most recent corrected calcium 10.9 yesterday  Would further vitamin D and calcium supplements  Recommend checking ionized calcium  Vitamin D deficiency  Vit D > 120  Has hypercalcemia as well on admission  Per [facility], patient on Cholecalciferol 50,000 weekly.  Hold vit D and calcium supplements for now  Monitor levels   Can take time to improve off treatment  Other cirrhosis of liver (HCC)  US right upper quadrant, 4/10/2025: Cirrhosis with severe hepatic steatosis and severe hepatomegaly with sequela of portal hypertension including moderate ascites   4 inpatient paracentesis with IR  Recommend albumin for high-volume paracentesis  Pancreatic cyst  History of idiopathic pancreatitis  Presented with mild abdominal pain, currently denies pain  CT A/P wo:  6.5 cm cystic mass in the pancreatic body/tail   Patient due for MRI/MRCP with contrast however due to BUD would hold off on any administration of IV contrast  Idiopathic pancreatitis  Gastroenterology following  Low bicarbonate  Likely in the setting of GI and liver disease  Monitor for now    Overall plan and recommendations has been reviewed with primary team and I agree with the plan as stated below  Reach out to RN to obtain a.m. blood work  No changes in current treatment till lab work  reviewed  Continue to hold vitamin D and calcium supplementation  With 1.8 L fluid restriction  Check BMP in a.m.    Review of Systems  Patient seen and examined at bedside.  Offers no complaints laying on right side.  A.m. blood work not obtained  Review of Systems   Constitutional: Negative.  Negative for activity change, appetite change, chills, diaphoresis, fatigue and fever.   HENT: Negative.  Negative for congestion and facial swelling.    Respiratory: Negative.     Cardiovascular: Negative.    Gastrointestinal: Negative.    Endocrine: Negative.    Genitourinary: Negative.    Musculoskeletal: Negative.    Skin: Negative.    Allergic/Immunologic: Negative.    Neurological: Negative.    Hematological: Negative.    Psychiatric/Behavioral: Negative.           Physical Exam:  Current Weight: Weight - Scale: 68.7 kg (151 lb 8 oz)  Vitals:    04/22/25 0302 04/22/25 0303 04/22/25 0720 04/22/25 1017   BP: (!) 92/45 100/55 99/55    BP Location: Left arm Right arm Left arm    Pulse: 79 79 82 82   Resp: 18 18 12    Temp: 97.6 °F (36.4 °C) 97.6 °F (36.4 °C) (!) 95.8 °F (35.4 °C) 97.5 °F (36.4 °C)   TempSrc: Oral Oral Oral    SpO2: 96% 96% 95% 95%   Weight:       Height:           Physical Exam  Vitals and nursing note reviewed.   Constitutional:       Appearance: Normal appearance.   HENT:      Head: Normocephalic and atraumatic.      Nose: Nose normal.      Mouth/Throat:      Mouth: Mucous membranes are moist.      Pharynx: Oropharynx is clear.   Eyes:      Extraocular Movements: Extraocular movements intact.      Conjunctiva/sclera: Conjunctivae normal.   Cardiovascular:      Rate and Rhythm: Normal rate and regular rhythm.      Pulses: Normal pulses.      Heart sounds: Normal heart sounds.   Pulmonary:      Effort: Pulmonary effort is normal.      Breath sounds: Normal breath sounds.   Abdominal:      General: Bowel sounds are normal. There is distension.      Palpations: Abdomen is soft.   Musculoskeletal:          General: Normal range of motion.      Cervical back: Normal range of motion and neck supple.   Skin:     General: Skin is warm and dry.      Coloration: Skin is jaundiced.   Neurological:      General: No focal deficit present.      Mental Status: He is alert.   Psychiatric:         Mood and Affect: Mood normal.         Behavior: Behavior normal.      Comments: Flat affect             Medications:    Current Facility-Administered Medications:     escitalopram (LEXAPRO) tablet 10 mg, 10 mg, Oral, Daily, Sudhir Mcdonald DO, 10 mg at 04/22/25 1015    heparin (porcine) subcutaneous injection 5,000 Units, 5,000 Units, Subcutaneous, Q8H [facility], Sudhir Mcdonald DO, 5,000 Units at 04/21/25 2330    multi-electrolyte (Plasmalyte-A/Isolyte-S PH 7.4/Normosol-R) IV bolus 1,000 mL, 1,000 mL, Intravenous, Once, Sudhir Mcdonald DO, Last Rate: 0 mL/hr at 04/19/25 1833, Restarted at 04/19/25 2359    pantoprazole (PROTONIX) injection 40 mg, 40 mg, Intravenous, Q12H [facility], Radha Srivastava MD, 40 mg at 04/22/25 1016    [Held by provider] spironolactone (ALDACTONE) tablet 50 mg, 50 mg, Oral, Daily, Sudhir Mcdonald DO    Laboratory Results:  Results from last 7 days   Lab Units 04/21/25 2015 04/21/25  0815 04/21/25  0032 04/20/25  1745 04/20/25  0527 04/19/25  1038 04/16/25  1215   WBC Thousand/uL  --  15.35*  --   --  17.75* 22.88*  --    HEMOGLOBIN g/dL 7.6* 7.9* 7.1* 7.9* 6.8* 7.7*  --    HEMATOCRIT %  --  23.4*  --   --  20.3* 23.6*  --    PLATELETS Thousands/uL  --  63*  --   --  64* 88*  --    SODIUM mmol/L  --  130*  --   --  131* 128* 128*   POTASSIUM mmol/L  --  3.6  --   --  4.0 4.3 4.5   CHLORIDE mmol/L  --  104  --   --  105 103 102   CO2 mmol/L  --  19*  --   --  19* 17* 17*   BUN mg/dL  --  15  --   --  16 16 17   CREATININE mg/dL  --  2.30*  --   --  2.47* 2.73* 2.72*   CALCIUM mg/dL  --  9.3  --   --  9.4 9.8 9.6   MAGNESIUM mg/dL  --   --   --   --  2.0 1.9  --    PHOSPHORUS mg/dL  --   --   --   --  2.9  --   --        Medical  "records have been reviewed through [facility] and care everywhere for this patient encounter    Portions of the record may have been created with voice recognition software. Occasional wrong word or \"sound a like\" substitutions may have occurred due to the inherent limitations of voice recognition software. Read the chart carefully and recognize,   "

## 2025-04-22 NOTE — ASSESSMENT & PLAN NOTE
Since hemoglobin 7.9  S/p 1 unit leukocyte reduced PRBC ordered per primary team  Patient has history of anemia  Monitor hemoglobin keep greater than 7  Management per primary team

## 2025-04-22 NOTE — ASSESSMENT & PLAN NOTE
US right upper quadrant, 4/10/2025: Cirrhosis with severe hepatic steatosis and severe hepatomegaly with sequela of portal hypertension including moderate ascites   4 inpatient paracentesis with IR  Recommend albumin for high-volume paracentesis

## 2025-04-22 NOTE — ASSESSMENT & PLAN NOTE
Hemoglobin 7.7 on admission, down from 12.5 in September 2023. MCV high 114. B12 and folate WNL. Iron 43 and ferritin 1365. Recent EGD and colonoscopy from December 2024 significant for gastritis and mild Schatzki's ring, colon polyps, diverticulosis, hemorrhoids. No current signs of overt GI bleeding.    - Monitor H&H and transfuse to keep hemoglobin greater than 7 (received 1 u 4/20 for Hg 6.8), Hg stable 7.6  - Monitor for GI bleeding  - Patient will need repeat EGD to screen for esophageal varices given new decompensation

## 2025-04-22 NOTE — ASSESSMENT & PLAN NOTE
Etiology: suspect prior BUD episode requiring RRT in 2018   Baseline creatinine 1.8-2.0 dating back to 2024; has had fluctuating creatinine 3-4.0 though 2023  Patient follows outpatient with [facility] nephrology. Nancy Yeung  admission creatinine 2.73  Recommend follow up with primary nephrologist as outpatient on discharge

## 2025-04-22 NOTE — ASSESSMENT & PLAN NOTE
Lab Results   Component Value Date    EGFR 30 04/21/2025    EGFR 28 04/20/2025    EGFR 25 04/19/2025    CREATININE 2.30 (H) 04/21/2025    CREATININE 2.47 (H) 04/20/2025    CREATININE 2.73 (H) 04/19/2025

## 2025-04-22 NOTE — PLAN OF CARE
Problem: Potential for Falls  Goal: Patient will remain free of falls  Description: INTERVENTIONS:- Educate patient/family on patient safety including physical limitations- Instruct patient to call for assistance with activity - Consult OT/PT to assist with strengthening/mobility - Keep Call bell within reach- Keep bed low and locked with side rails adjusted as appropriate- Keep care items and personal belongings within reach- Initiate and maintain comfort rounds- Make Fall Risk Sign visible to staff- Offer Toileting every 2 Hours, in advance of need- Initiate/Maintain bed/chair alarm- Obtain necessary fall risk management equipment: walker if needed for ambulation- Apply yellow socks and bracelet for high fall risk patients- Consider moving patient to room near nurses station  INTERVENTIONS:- Educate patient/family on patient safety including physical limitations- Instruct patient to call for assistance with activity - Consult OT/PT to assist with strengthening/mobility - Keep Call bell within reach- Keep bed low and locked with side rails adjusted as appropriate- Keep care items and personal belongings within reach- Initiate and maintain comfort rounds- Make Fall Risk Sign visible to staff- Offer Toileting every 2 Hours, in advance of need- Initiate/Maintain bed/chair alarm- Obtain necessary fall risk management equipment: walker if needed for ambulation- Apply yellow socks and bracelet for high fall risk patients- Consider moving patient to room near nurses station  Outcome: Progressing     Problem: PAIN - ADULT  Goal: Verbalizes/displays adequate comfort level or baseline comfort level  Description: Interventions:- Encourage patient to monitor pain and request assistance- Assess pain using appropriate pain scale- Administer analgesics based on type and severity of pain and evaluate response- Implement non-pharmacological measures as appropriate and evaluate response- Consider cultural and social influences on  pain and pain management- Notify physician/advanced practitioner if interventions unsuccessful or patient reports new pain  Interventions:- Encourage patient to monitor pain and request assistance- Assess pain using appropriate pain scale- Administer analgesics based on type and severity of pain and evaluate response- Implement non-pharmacological measures as appropriate and evaluate response- Consider cultural and social influences on pain and pain management- Notify physician/advanced practitioner if interventions unsuccessful or patient reports new pain  Outcome: Progressing     Problem: DISCHARGE PLANNING  Goal: Discharge to home or other facility with appropriate resources  Description: INTERVENTIONS:- Identify barriers to discharge w/patient and caregiver- Arrange for needed discharge resources and transportation as appropriate- Identify discharge learning needs (meds, wound care, etc.)- Arrange for interpretive services to assist at discharge as needed- Refer to Case Management Department for coordinating discharge planning if the patient needs post-hospital services based on physician/advanced practitioner order or complex needs related to functional status, cognitive ability, or social support system  INTERVENTIONS:- Identify barriers to discharge w/patient and caregiver- Arrange for needed discharge resources and transportation as appropriate- Identify discharge learning needs (meds, wound care, etc.)- Arrange for interpretive services to assist at discharge as needed- Refer to Case Management Department for coordinating discharge planning if the patient needs post-hospital services based on physician/advanced practitioner order or complex needs related to functional status, cognitive ability, or social support system  Outcome: Progressing     Problem: Knowledge Deficit  Goal: Patient/family/caregiver demonstrates understanding of disease process, treatment plan, medications, and discharge  instructions  Description: Complete learning assessment and assess knowledge base.Interventions:- Provide teaching at level of understanding- Provide teaching via preferred learning methods  Complete learning assessment and assess knowledge base.Interventions:- Provide teaching at level of understanding- Provide teaching via preferred learning methods  Outcome: Progressing     Problem: INFECTION - ADULT  Goal: Absence or prevention of progression during hospitalization  Description: INTERVENTIONS:- Assess and monitor for signs and symptoms of infection- Monitor lab/diagnostic results- Monitor all insertion sites, i.e. indwelling lines, tubes, and drains- Monitor endotracheal if appropriate and nasal secretions for changes in amount and color- [place] appropriate cooling/warming therapies per order- Administer medications as ordered- Instruct and encourage patient and family to use good hand hygiene technique- Identify and instruct in appropriate isolation precautions for identified infection/condition  Outcome: Progressing  Goal: Absence of fever/infection during neutropenic period  Description: INTERVENTIONS:- Monitor WBC  Outcome: Progressing     Problem: SAFETY ADULT  Goal: Patient will remain free of falls  Description: INTERVENTIONS:- Educate patient/family on patient safety including physical limitations- Instruct patient to call for assistance with activity - Consult OT/PT to assist with strengthening/mobility - Keep Call bell within reach- Keep bed low and locked with side rails adjusted as appropriate- Keep care items and personal belongings within reach- Initiate and maintain comfort rounds- Make Fall Risk Sign visible to staff- Offer Toileting every 2 Hours, in advance of need- Initiate/Maintain bed/chair alarm- Obtain necessary fall risk management equipment: walker if needed for ambulation- Apply yellow socks and bracelet for high fall risk patients- Consider moving patient to room near nurses  station  INTERVENTIONS:- Educate patient/family on patient safety including physical limitations- Instruct patient to call for assistance with activity - Consult OT/PT to assist with strengthening/mobility - Keep Call bell within reach- Keep bed low and locked with side rails adjusted as appropriate- Keep care items and personal belongings within reach- Initiate and maintain comfort rounds- Make Fall Risk Sign visible to staff- Offer Toileting every 2 Hours, in advance of need- Initiate/Maintain bed/chair alarm- Obtain necessary fall risk management equipment: walker if needed for ambulation- Apply yellow socks and bracelet for high fall risk patients- Consider moving patient to room near nurses station  Outcome: Progressing  Goal: Maintain or return to baseline ADL function  Description: INTERVENTIONS:-  Assess patient's ability to carry out ADLs; assess patient's baseline for ADL function and identify physical deficits which impact ability to perform ADLs (bathing, care of mouth/teeth, toileting, grooming, dressing, etc.)- Assess/evaluate cause of self-care deficits - Assess range of motion- Assess patient's mobility; develop plan if impaired- Assess patient's need for assistive devices and provide as appropriate- Encourage maximum independence but intervene and supervise when necessary- Involve family in performance of ADLs- Assess for home care needs following discharge - Consider OT consult to assist with ADL evaluation and planning for discharge- Provide patient education as appropriate  Outcome: Progressing  Goal: Maintains/Returns to pre admission functional level  Description: INTERVENTIONS:- Perform AM-PAC 6 Click Basic Mobility/ Daily Activity assessment daily.- Set and communicate daily mobility goal to care team and patient/family/caregiver. - Collaborate with rehabilitation services on mobility goals if consulted- Perform Range of Motion 3 times a day.- Reposition patient every 2 hours.- Dangle patient  2 times a day- Stand patient 2 times a day- Ambulate patient 2 times a day- Out of bed to chair 2 times a day - Out of bed for meals 2 times a day- Out of bed for toileting- Record patient progress and toleration of activity level   Outcome: Progressing     Problem: Nutrition/Hydration-ADULT  Goal: Nutrient/Hydration intake appropriate for improving, restoring or maintaining nutritional needs  Description: Monitor and assess patient's nutrition/hydration status for malnutrition. Collaborate with interdisciplinary team and initiate plan and interventions as ordered.  Monitor patient's weight and dietary intake as ordered or per policy. Utilize nutrition screening tool and intervene as necessary. Determine patient's food preferences and provide high-protein, high-caloric foods as appropriate. INTERVENTIONS:- Monitor oral intake, urinary output, labs, and treatment plans- Assess nutrition and hydration status and recommend course of action- Evaluate amount of meals eaten- Assist patient with eating if necessary - Allow adequate time for meals- Recommend/ encourage appropriate diets, oral nutritional supplements, and vitamin/mineral supplements- Order, calculate, and assess calorie counts as needed- Recommend, monitor, and adjust tube feedings and TPN/PPN based on assessed needs- Assess need for intravenous fluids- Provide specific nutrition/hydration education as appropriate- Include patient/family/caregiver in decisions related to nutrition  Outcome: Progressing     Problem: Prexisting or High Potential for Compromised Skin Integrity  Goal: Skin integrity is maintained or improved  Description: INTERVENTIONS:- Identify patients at risk for skin breakdown- Assess and monitor skin integrity- Assess and monitor nutrition and hydration status- Monitor labs - Assess for incontinence - Turn and reposition patient- Assist with mobility/ambulation- Relieve pressure over bony prominences- Avoid friction and shearing- Provide  appropriate hygiene as needed including keeping skin clean and dry- Evaluate need for skin moisturizer/barrier cream- Collaborate with interdisciplinary team - Patient/family teaching- Consider wound care consult   Outcome: Progressing     Problem: GASTROINTESTINAL - ADULT  Goal: Minimal or absence of nausea and/or vomiting  Description: INTERVENTIONS:- Administer IV fluids if ordered to ensure adequate hydration- Maintain NPO status until nausea and vomiting are resolved- Nasogastric tube if ordered- Administer ordered antiemetic medications as needed- Provide nonpharmacologic comfort measures as appropriate- Advance diet as tolerated, if ordered- Consider nutrition services referral to assist patient with adequate nutrition and appropriate food choices  Outcome: Progressing  Goal: Maintains or returns to baseline bowel function  Description: INTERVENTIONS:- Assess bowel function- Encourage oral fluids to ensure adequate hydration- Administer IV fluids if ordered to ensure adequate hydration- Administer ordered medications as needed- Encourage mobilization and activity- Consider nutritional services referral to assist patient with adequate nutrition and appropriate food choices  Outcome: Progressing  Goal: Maintains adequate nutritional intake  Description: INTERVENTIONS:- Monitor percentage of each meal consumed- Identify factors contributing to decreased intake, treat as appropriate- Assist with meals as needed- Monitor I&O, weight, and lab values if indicated- Obtain nutrition services referral as needed  Outcome: Progressing  Goal: Establish and maintain optimal ostomy function  Description: INTERVENTIONS:- Assess bowel function- Encourage oral fluids to ensure adequate hydration- Administer IV fluids if ordered to ensure adequate hydration - Administer ordered medications as needed- Encourage mobilization and activity- Nutrition services referral to assist patient with appropriate food choices- Assess stoma  site- Consider wound care consult   Outcome: Progressing  Goal: Oral mucous membranes remain intact  Description: INTERVENTIONS- Assess oral mucosa and hygiene practices- Implement preventative oral hygiene regimen- Implement oral medicated treatments as ordered- Initiate Nutrition services referral as needed  Outcome: Progressing     Problem: METABOLIC, FLUID AND ELECTROLYTES - ADULT  Goal: Electrolytes maintained within normal limits  Description: INTERVENTIONS:- Monitor labs and assess patient for signs and symptoms of electrolyte imbalances- Administer electrolyte replacement as ordered- Monitor response to electrolyte replacements, including repeat lab results as appropriate- Instruct patient on fluid and nutrition as appropriate  Outcome: Progressing  Goal: Fluid balance maintained  Description: INTERVENTIONS:- Monitor labs - Monitor I/O and WT- Instruct patient on fluid and nutrition as appropriate- Assess for signs & symptoms of volume excess or deficit  Outcome: Progressing  Goal: Glucose maintained within target range  Description: INTERVENTIONS:- Monitor Blood Glucose as ordered- Assess for signs and symptoms of hyperglycemia and hypoglycemia- Administer ordered medications to maintain glucose within target range- Assess nutritional intake and initiate nutrition service referral as needed  Outcome: Progressing     Problem: HEMATOLOGIC - ADULT  Goal: Maintains hematologic stability  Description: INTERVENTIONS- Assess for signs and symptoms of bleeding or hemorrhage- Monitor labs- Administer supportive blood products/factors as ordered and appropriate  Outcome: Progressing

## 2025-04-22 NOTE — ASSESSMENT & PLAN NOTE
Newly diagnosed. There is evidence of cirrhosis based on labs, imaging, and physical exam. Bilirubin 2.96. Albumin 1.9. INR 1.57. Platelets 64,000. RUQ US 4/10/2025 shows cirrhosis with severe steatosis, hepatomegaly, sequela of portal HTN including moderate ascites. CT A/P 4/19 again shows evidence of cirrhosis, moderate to large ascites. No significant alcohol use in the past. Extensive liver serologic workup significant for low ceruloplasmin suspicious for Roque's disease and elevated ferritin 1365, concern for hemochromatosis. Autoimmune and viral serologies negative. MELD 3.0 = 29 on 4/21, today's labs pending.    4/22: for paracentesis today, MRI with pancreatic cysts up to 5.7 cm, PDD, rec EUS as outpt    - Monitor MELD labs daily  - await HFE gene mutation, alpha 1 antitrypsin level  -await 24 hr urine copper r/o Roque's  -Dr. Mosqueda reviewed with Dr. Mota yesterday.  Patient with history of heavy alcohol and prior admissions for this, pt denies.  Reports was drinking socially up until 6 months ago  - Recommend close outpatient GI follow-up    Ascites:  -Recommend diagnostic and therapeutic paracentesis to rule out SBP and confirm ascites is secondary to cirrhosis/portal HTN  -Recommend low sodium diet  -Hold spironolactone due to BUD    HE:  No overt s/sx at this time, monitor mental status    Screening for esophageal varices:  No evidence of varices on EGD from December 2024  - He will eventually need repeat EGD due to new hepatic decompensation    HCC screening  No evidence of hepatoma on RUQ ultrasound 4/10

## 2025-04-22 NOTE — ASSESSMENT & PLAN NOTE
Patient with past medical history of CKD  Patient follows with [facility] nephrology  Patient current creatinine is 2.30 and baseline was 1.7  Patient was started recently on spironolactone  Plan  Hold spironolactone  Avoid nephrotoxic agent   Nephrology following

## 2025-04-22 NOTE — ASSESSMENT & PLAN NOTE
Patient with history of idiopathic pancreatitis. No evidence of pancreatitis at present time. CT shows apparent cystic mass in the pancreatic body/tail measuring approximately 6.5 x 4.3 cm and another 11 mm cyst along the medial pancreatic head. Suspect cysts may be pseudocysts related to prior episode of pancreatitis.    - MRI ABD:  5.7 and 2.2 cm panc cysts, possible pseudocysts  vs indeterminate, PD dilated and communicates with cyst, rec EUS as outpt

## 2025-04-22 NOTE — DISCHARGE INSTRUCTIONS
Abdominal Paracentesis     WHAT YOU NEED TO KNOW:   Abdominal paracentesis is a procedure to remove abnormal fluid buildup in your abdomen. Fluid builds up because of liver problems, such as swelling and scarring. Heart failure, kidney disease, a mass, or problems with your pancreas may also cause fluid buildup.     DISCHARGE INSTRUCTIONS:     Follow up with your healthcare provider as directed: Write down your questions so you remember to ask them during your visits.     Wound care: Remove dressing after 24 hours. Leave glue in place.    Return to your normal activities    Contact Interventional Radiology at [phone] (ART[facility] PATIENTS: Contact Interventional Radiology at [phone]) (RAD[facility] PATIENTS: Contact Interventional Radiology at [phone]) if:  You have a fever and your wound is red and swollen.   You have yellow, green, or bad-smelling discharge coming from your wound.   You have pain or swelling in your abdomen.   You have an upset stomach or you vomit.   You have sudden, sharp pain in your abdomen.   You urinate very little or not at all.   You feel confused and more tired than usual.   Your arm or leg feels warm, tender, and painful. It may look swollen and red.   You suddenly feel lightheaded and have trouble breathing.

## 2025-04-22 NOTE — PROGRESS NOTES
Progress Note - Hospitalist   Name: Sudhir Bach 55 y.o. male I MRN: [id number]  Unit/Bed#: -01 I Date of Admission: 4/19/2025   Date of Service: 4/22/2025 I Hospital Day: 3    Assessment & Plan  Other cirrhosis of liver (HCC)  Patient was past medical history of transaminitis  Patient presents with generalized weakness and lethargy  Patient follow-up with gastroenterology  Was concern for cirrhosis probably due to [place] due to new onset ascites and abnormal liver function test as patient denied any alcohol intake  Patient was seen recently by gastroenterology and autoimmune, viral serology, ceruloplasmin, iron panel were ordered  Chronic viral serology are nonreactive  Ceruloplasmin is 9.2  Patient recent labs showed worsening kidney function with creatinine of 2.73, AST 61, sodium 128, T. bili 3.77, INR 1.4  Patient was referred to the ED for extensive workup  Patient presented with weakness  GI recommended starting low-dose of spironolactone  Plan  Consult gastroenterology appreciate recommendations  Will hold spironolactone in the setting of BUD  Trend MELD labs  Patient underwent paracentesis with IR and 2820 cc of clear yellow fluid drained  Follow-up culture results  GI planning for liver biopsy   24-hour urine collection for copper to rule out Roque's  Await HFE gene mutation, alpha 1 antitrypsin level  GI follow-up  BUD (acute kidney injury) (HCC)  Patient with past medical history of CKD  Patient follows with [facility]  Patient current creatinine is 2.30 and baseline was 1.7  Patient was started recently on spironolactone  Plan  Hold spironolactone  Avoid nephrotoxic agent   Nephrology following  Pancreatic cyst  Patient with history of idiopathic pancreatitis  Patient presented with mild abdominal pain  Patient with elevated T. bili and jaundice on exam  CT abdomen pelvis without contrast 6.5 cm cystic mass in the pancreatic body/tail, indeterminate. Additional 11 mm pancreatic head  "cyst.   MRCP pending  SIRS (systemic inflammatory response syndrome) ([facility])  Patient met SIRS criteria through leukocytosis and tachypnea  She denied any fever, burning urination  UA showed 2-4 white cell count  No signs of infection  Will continue to monitor off antibiotic  Blood cultures are negative to date  Weight loss  Patient presented with weight loss  Patient reported loss of 19 pounds in the last 2 weeks  Patient also complained of weakness, lethargy  Patient had recent EGD and colonoscopy were unremarkable  In the setting of elevated liver enzymes and BUD    Mri abdomen pending  History of CVA (cerebrovascular accident)  Patient with past medical history of stroke with no residual deficits  Currently on statin  Will hold statin in the setting of elevated liver enzymes  Mixed hyperlipidemia  On statin  Will hold statin due to elevated liver enzymes  Anemia  Patient has history of anemia  Iron is 43 and ferritin 1365  Probably due to anemia of chronic disease plus iron deficiency  Follow-up on nephrology recommendations  Maintain hemoglobin above 7 and transfuse as needed  Status post 1 PRBC transfusion  Current hemoglobin 7.9  Monitor hemoglobin q12   CKD (chronic kidney disease) stage 4, GFR 15-29 ml/min ([facility])  Lab Results   Component Value Date    EGFR 30 04/21/2025    EGFR 28 04/20/2025    EGFR 25 04/19/2025    CREATININE 2.30 (H) 04/21/2025    CREATININE 2.47 (H) 04/20/2025    CREATININE 2.73 (H) 04/19/2025   Avoid nephrotoxic agents  Follow-up on creatinine  Urology following, appreciate recommendations  Urinary retention protocol  Creatinine is 2.47.  Nephrology follow-up.  Continue to hold spironolactone  Vitamin D deficiency  On vitamin D once weekly    Idiopathic pancreatitis  Patient with past medical history of idiopathic pancreatitis    CT abdomen/pelvis-  6.5 cm cystic mass in the pancreatic body/tail, indeterminate. Additional 11 mm pancreatic head cyst.     Lipase-normal  MRI/MRCP abdomen-\"No " "evidence of choledocholithiasis.Cholelithiasis with pericholecystic fluid,   5.7 x 5.2 cm cyst in relation to the distal body/tail of the pancreas communicating with the dorsal pancreatic duct, suggest pancreatic pseudocyst in the context of patient having a previous bout of pancreatitis, other etiologies could include IPMN.Additional 2.2 cm cyst in the head of the pancreas with thick walls, indeterminate may be sequela of prior pancreatitis.The pancreatic lesions can be assessed with the EUS, These are  not described on the previous study performed at outside facility in 2023.Hepatic steatosis\"    Hyponatremia  Mental status is baseline  IV fluids as per nephrology  Serial BMPs  Hyponatremia work  Hypercalcemia    Low bicarbonate    Chronic renal failure  Lab Results   Component Value Date    EGFR 30 04/21/2025    EGFR 28 04/20/2025    EGFR 25 04/19/2025    CREATININE 2.30 (H) 04/21/2025    CREATININE 2.47 (H) 04/20/2025    CREATININE 2.73 (H) 04/19/2025       Labs & Imaging: Results Review Statement: No pertinent imaging studies reviewed.    VTE Prophylaxis: in place.    Code Status:   Level 1 - Full Code    Patient Centered Rounds: I have performed bedside rounds with nursing staff today.    Mobility:   Basic Mobility Inpatient Raw Score: 18  JH-HLM Goal: 6: Walk 10 steps or more  JH-HLM Achieved: 8: Walk 250 feet ot more  JH-HLM Goal achieved. Continue to encourage appropriate mobility.    Discussions with Specialists or Other Care Team Provider: GI    Education and Discussions with Family / Patient: Patient declined family update    Total Time Spent on Date of Encounter in care of patient: 35 mins. This time was spent on one or more of the following: performing physical exam; counseling and coordination of care; obtaining or reviewing history; documenting in the medical record; reviewing/ordering tests, medications or procedures; communicating with other healthcare professionals and discussing with patient's " "family/caregivers.    Current Length of Stay: 3 day(s)    Current Patient Status: Inpatient   Certification Statement: The patient will continue to require additional inpatient hospital stay due to see my assessment and plan.     Subjective:   Patient is seen and examined at bedside.  Denies any new complaints.  Afebrile.  Feels weak and tired  All other ROS are negative.    Objective:    Vitals: Blood pressure 99/55, pulse 82, temperature (!) 95.8 °F (35.4 °C), temperature source Oral, resp. rate 12, height 5' 8\" (1.727 m), weight 68.7 kg (151 lb 8 oz), SpO2 95%.,Body mass index is 23.04 kg/m².  SPO2 RA Rest      Flowsheet Row ED to Hosp-Admission (Current) from 4/19/2025 in [facility] Med Surg Unit   SpO2 95 %   SpO2 Activity At Rest   O2 Device None (Room air)   O2 Flow Rate --          I&O:   Intake/Output Summary (Last 24 hours) at 4/22/2025 0833  Last data filed at 4/22/2025 0720  Gross per 24 hour   Intake 1581.25 ml   Output 850 ml   Net 731.25 ml       Physical Exam:    General- Alert, lying comfortably in bed. Not in any acute distress.  Neck- Supple, No JVD  CVS- regular, S1 and S2 normal  Chest- Bilateral Air entry, No rhochi, crackles or wheezing present.  Abdomen- soft, nontender, not distended, no guarding or rigidity, BS+  Extremities-  No pedal edema, No calf tenderness  CNS-   Alert, awake and orientedx3. No focal deficits present.    Invasive Devices       Peripheral Intravenous Line  Duration             Peripheral IV 04/19/25 Left;Ventral (anterior) Forearm 2 days                          Social History  reviewed  Family History   Problem Relation Age of Onset    Hypertension Father     Colon cancer Neg Hx     reviewed    Meds:  Current Facility-Administered Medications   Medication Dose Route Frequency Provider Last Rate Last Admin    escitalopram (LEXAPRO) tablet 10 mg  10 mg Oral Daily Sudhir Mcdonald, DO   10 mg at 04/21/25 0817    heparin (porcine) subcutaneous injection " 5,000 Units  5,000 Units Subcutaneous Q8H [facility] Sudhir Mcdonald DO   5,000 Units at 04/21/25 2330    multi-electrolyte (Plasmalyte-A/Isolyte-S PH 7.4/Normosol-R) IV bolus 1,000 mL  1,000 mL Intravenous Once Sudhir Mcdonald DO 0 mL/hr at 04/19/25 1833 Restarted at 04/19/25 2359    pantoprazole (PROTONIX) injection 40 mg  40 mg Intravenous Q12H [facility] Radha Srivastava MD   40 mg at 04/21/25 2330    [Held by provider] spironolactone (ALDACTONE) tablet 50 mg  50 mg Oral Daily Sudhir Mcdonald DO            Medications Prior to Admission:     Cholecalciferol (Vitamin D3) 1.25 MG (41609 UT) CAPS    escitalopram (LEXAPRO) 10 mg tablet    pantoprazole (PROTONIX) 40 mg tablet    rosuvastatin (CRESTOR) 10 MG tablet    spironolactone (ALDACTONE) 50 mg tablet    potassium chloride (Klor-Con M20) 20 mEq tablet    Labs:  Results from last 7 days   Lab Units 04/21/25 2015 04/21/25  0815 04/21/25  0032 04/20/25  1745 04/20/25 0527 04/19/25  1038   WBC Thousand/uL  --  15.35*  --   --  17.75* 22.88*   HEMOGLOBIN g/dL 7.6* 7.9* 7.1*   < > 6.8* 7.7*   HEMATOCRIT %  --  23.4*  --   --  20.3* 23.6*   PLATELETS Thousands/uL  --  63*  --   --  64* 88*   SEGS PCT %  --  70  --   --  70 81*   LYMPHO PCT %  --  19  --   --  19 11*   MONO PCT %  --  9  --   --  9 7   EOS PCT %  --  1  --   --  1 0    < > = values in this interval not displayed.     Results from last 7 days   Lab Units 04/21/25  0815 04/20/25 0527 04/19/25  1038   POTASSIUM mmol/L 3.6 4.0 4.3   CHLORIDE mmol/L 104 105 103   CO2 mmol/L 19* 19* 17*   BUN mg/dL 15 16 16   CREATININE mg/dL 2.30* 2.47* 2.73*   CALCIUM mg/dL 9.3 9.4 9.8   ALK PHOS U/L 102 94 111*   ALT U/L 31 32 39   AST U/L 39 43* 61*     Lab Results   Component Value Date    TROPONINI <0.02 11/11/2018     Results from last 7 days   Lab Units 04/21/25  0815 04/20/25  0527 04/19/25  1203   INR  1.47* 1.57* 1.60*     Lab Results   Component Value Date    BLOODCX No Growth at 48 hrs. 04/19/2025    BLOODCX No Growth at 48 hrs.  04/19/2025         Imaging:  Results for orders placed during the hospital encounter of 04/19/25    XR chest 1 view portable    Narrative  XR CHEST PORTABLE    INDICATION: 2-week history of weakness, fatigue, and abdominal pain.    COMPARISON: None    FINDINGS:    Clear lungs. No pneumothorax or pleural effusion.    Normal cardiomediastinal silhouette.    Bones are unremarkable for age.    Normal upper abdomen.    Impression  No acute cardiopulmonary disease.        Resident: Filipe Valenzuela I, the attending radiologist, have reviewed the images and agree with the final report above.    Workstation performed: [id number]    No results found for this or any previous visit.      Last 24 Hours Medication List:   Current Facility-Administered Medications   Medication Dose Route Frequency Provider Last Rate    escitalopram  10 mg Oral Daily Sudhir Mcdonald DO      heparin (porcine)  5,000 Units Subcutaneous Q8H PAULY Mcdonald DO      multi-electrolyte  1,000 mL Intravenous Once Sudhir Mcdonald DO 0 mL (04/19/25 1833)    pantoprazole  40 mg Intravenous Q12H PAULY Srivastava MD      [Held by provider] spironolactone  50 mg Oral Daily Sudhir Mcdonald DO          Today, Patient Was Seen By: Alexis Alvarez MD    ** Please Note: Dictation voice to text software may have been used in the creation of this document. **

## 2025-04-23 LAB
ALBUMIN SERPL BCG-MCNC: 2.3 G/DL (ref 3.5–5)
ALP SERPL-CCNC: 88 U/L (ref 34–104)
ALT SERPL W P-5'-P-CCNC: 29 U/L (ref 7–52)
ANION GAP SERPL CALCULATED.3IONS-SCNC: 5 MMOL/L (ref 4–13)
AST SERPL W P-5'-P-CCNC: 39 U/L (ref 13–39)
BASOPHILS # BLD AUTO: 0.04 THOUSANDS/ÂΜL (ref 0–0.1)
BASOPHILS NFR BLD AUTO: 0 % (ref 0–1)
BILIRUB SERPL-MCNC: 3.78 MG/DL (ref 0.2–1)
BUN SERPL-MCNC: 16 MG/DL (ref 5–25)
CALCIUM ALBUM COR SERPL-MCNC: 11.1 MG/DL (ref 8.3–10.1)
CALCIUM SERPL-MCNC: 9.7 MG/DL (ref 8.4–10.2)
CHLORIDE SERPL-SCNC: 105 MMOL/L (ref 96–108)
CO2 SERPL-SCNC: 22 MMOL/L (ref 21–32)
CREAT SERPL-MCNC: 2.28 MG/DL (ref 0.6–1.3)
EOSINOPHIL # BLD AUTO: 0.08 THOUSAND/ÂΜL (ref 0–0.61)
EOSINOPHIL NFR BLD AUTO: 1 % (ref 0–6)
ERYTHROCYTE [DISTWIDTH] IN BLOOD BY AUTOMATED COUNT: 19 % (ref 11.6–15.1)
GFR SERPL CREATININE-BSD FRML MDRD: 31 ML/MIN/1.73SQ M
GLUCOSE SERPL-MCNC: 136 MG/DL (ref 65–140)
HCT VFR BLD AUTO: 23.3 % (ref 36.5–49.3)
HGB BLD-MCNC: 7.6 G/DL (ref 12–17)
IMM GRANULOCYTES # BLD AUTO: 0.12 THOUSAND/UL (ref 0–0.2)
IMM GRANULOCYTES NFR BLD AUTO: 1 % (ref 0–2)
INR PPP: 1.47 (ref 0.85–1.19)
LYMPHOCYTES # BLD AUTO: 3.35 THOUSANDS/ÂΜL (ref 0.6–4.47)
LYMPHOCYTES NFR BLD AUTO: 20 % (ref 14–44)
MAGNESIUM SERPL-MCNC: 1.9 MG/DL (ref 1.9–2.7)
MCH RBC QN AUTO: 34.4 PG (ref 26.8–34.3)
MCHC RBC AUTO-ENTMCNC: 32.6 G/DL (ref 31.4–37.4)
MCV RBC AUTO: 105 FL (ref 82–98)
MONOCYTES # BLD AUTO: 1.3 THOUSAND/ÂΜL (ref 0.17–1.22)
MONOCYTES NFR BLD AUTO: 8 % (ref 4–12)
NEUTROPHILS # BLD AUTO: 11.7 THOUSANDS/ÂΜL (ref 1.85–7.62)
NEUTS SEG NFR BLD AUTO: 70 % (ref 43–75)
NRBC BLD AUTO-RTO: 0 /100 WBCS
PLATELET # BLD AUTO: 68 THOUSANDS/UL (ref 149–390)
PMV BLD AUTO: 11 FL (ref 8.9–12.7)
POTASSIUM SERPL-SCNC: 4.1 MMOL/L (ref 3.5–5.3)
PROT SERPL-MCNC: 7.2 G/DL (ref 6.4–8.4)
PROTHROMBIN TIME: 18.3 SECONDS (ref 12.3–15)
RBC # BLD AUTO: 2.21 MILLION/UL (ref 3.88–5.62)
SODIUM SERPL-SCNC: 132 MMOL/L (ref 135–147)
WBC # BLD AUTO: 16.59 THOUSAND/UL (ref 4.31–10.16)

## 2025-04-23 PROCEDURE — 83735 ASSAY OF MAGNESIUM: CPT | Performed by: INTERNAL MEDICINE

## 2025-04-23 PROCEDURE — 85025 COMPLETE CBC W/AUTO DIFF WBC: CPT | Performed by: PHYSICIAN ASSISTANT

## 2025-04-23 PROCEDURE — 85610 PROTHROMBIN TIME: CPT | Performed by: PHYSICIAN ASSISTANT

## 2025-04-23 PROCEDURE — 99232 SBSQ HOSP IP/OBS MODERATE 35: CPT | Performed by: INTERNAL MEDICINE

## 2025-04-23 PROCEDURE — 80053 COMPREHEN METABOLIC PANEL: CPT | Performed by: PHYSICIAN ASSISTANT

## 2025-04-23 RX ORDER — ALBUMIN (HUMAN) 12.5 G/50ML
25 SOLUTION INTRAVENOUS 2 TIMES DAILY
Status: COMPLETED | OUTPATIENT
Start: 2025-04-23 | End: 2025-04-23

## 2025-04-23 RX ORDER — AMLODIPINE BESYLATE 5 MG/1
TABLET ORAL
Qty: 90 TABLET | Refills: 1 | OUTPATIENT
Start: 2025-04-23

## 2025-04-23 RX ORDER — ALBUMIN (HUMAN) 12.5 G/50ML
25 SOLUTION INTRAVENOUS ONCE
Status: COMPLETED | OUTPATIENT
Start: 2025-04-23 | End: 2025-04-23

## 2025-04-23 RX ORDER — MIDODRINE HYDROCHLORIDE 5 MG/1
2.5 TABLET ORAL DAILY
Status: DISCONTINUED | OUTPATIENT
Start: 2025-04-24 | End: 2025-04-24 | Stop reason: HOSPADM

## 2025-04-23 RX ORDER — MIDODRINE HYDROCHLORIDE 5 MG/1
2.5 TABLET ORAL
Status: DISCONTINUED | OUTPATIENT
Start: 2025-04-24 | End: 2025-04-23

## 2025-04-23 RX ADMIN — HEPARIN SODIUM 5000 UNITS: 5000 INJECTION INTRAVENOUS; SUBCUTANEOUS at 13:51

## 2025-04-23 RX ADMIN — PANTOPRAZOLE SODIUM 40 MG: 40 INJECTION, POWDER, FOR SOLUTION INTRAVENOUS at 09:46

## 2025-04-23 RX ADMIN — HEPARIN SODIUM 5000 UNITS: 5000 INJECTION INTRAVENOUS; SUBCUTANEOUS at 21:03

## 2025-04-23 RX ADMIN — HEPARIN SODIUM 5000 UNITS: 5000 INJECTION INTRAVENOUS; SUBCUTANEOUS at 06:34

## 2025-04-23 RX ADMIN — ESCITALOPRAM OXALATE 10 MG: 10 TABLET ORAL at 09:46

## 2025-04-23 RX ADMIN — ALBUMIN (HUMAN) 25 G: 0.25 INJECTION, SOLUTION INTRAVENOUS at 10:14

## 2025-04-23 RX ADMIN — ALBUMIN (HUMAN) 25 G: 0.25 INJECTION, SOLUTION INTRAVENOUS at 03:27

## 2025-04-23 RX ADMIN — PANTOPRAZOLE SODIUM 40 MG: 40 INJECTION, POWDER, FOR SOLUTION INTRAVENOUS at 21:03

## 2025-04-23 RX ADMIN — ALBUMIN (HUMAN) 25 G: 0.25 INJECTION, SOLUTION INTRAVENOUS at 18:11

## 2025-04-23 NOTE — ASSESSMENT & PLAN NOTE
BUD on CKD,Cr  2.73> 2.2 compared to baseline 1.8-2.0. Patient recently initiated on spironolactone.    - Agree with holding spironolactone  - Appreciate nephrology recommendations  -monitor and correct Cr abd electrolytes

## 2025-04-23 NOTE — ASSESSMENT & PLAN NOTE
Vit D > 120  Has hypercalcemia as well on admission  Per [facility] Home meds, patient on Cholecalciferol 50,000 weekly.  Hold vit D and calcium supplements for now  Monitor levels   Can take time to improve off treatment

## 2025-04-23 NOTE — PROGRESS NOTES
Progress Note - Nephrology   Name: Sudhir Bach 55 y.o. male I MRN: [id number]  Unit/Bed#: -01 I Date of Admission: 4/19/2025   Date of Service: 4/23/2025 I Hospital Day: 4     Assessment & Plan  BUD (acute kidney injury) (HCC)  Etiology: multifactorial:  Prerenal given  poor oral intake + hypovolemia/dehydration with decreased effective volume history of cirrhosis, leading to ATN  Admission creatinine 2.73  Baseline creatinine 1.8-2.0 since 2024.  Prior to that 3.0-4.0 since 2023  [place] off IV fluids  Creatinine 2.28-seems to be fluctuating between 2.2 and 2.4  Not on ace/arb outpateint  Spironolactone currently on hold  Currently having some lower blood pressure readings  That is post paracentesis for 2820 ml yesterday  Workup:  UA: Trace blood and leukocytes, +1 protein, moderate epithelial cells, 20-30 hyaline casts, 3-5 granular casts, innumerable bacteria and mucus threads  Urine electrolytes: Urine chloride 88, urine creatinine 129, urine sodium 69, urine urea 367   FENa: 1.0% -indeterminate.  Can be seen with either prerenal or intrinsic BUD  FEUrea: 43.9% - >35% suggestive of intrinsic A  PCXR: Negative  4/19/2025 CT A/P wo: 5 mm nonobstructing calculus left mid kidney. 4 mm calculus left UPJ, without obstruction.   Plan:  Avoid all NSAIDs, nephrotoxic agents, as well as IV contrast  Avoid hypotension and any perturbations in blood pressure  Closely monitor I&O, daily weight and labs  Discussed with primary team-will give albumin 25% 25 g x 2 and primary to initiate midodrine 2.5 mg daily blood pressure support  Tentative discharge today   Recommend ongoing follow-up his primary nephrologist at [facility]  CKD (chronic kidney disease) stage 4, GFR 15-29 ml/min ([facility])  Etiology: suspect prior BUD episode requiring RRT in 2018   Baseline creatinine 1.8-2.0 dating back to 2024; has had fluctuating creatinine 3-4.0 though 2023  Patient follows outpatient with [facility]  [facility] nephrology. Nancy Yeung  Recommend follow up with primary nephrologist as outpatient on discharge  Hyponatremia  Etiology: likely d/t poor oral intake + diuretic use + hypovolemic along with component of SIADH  Admission sodium was 128  Current sodium 132  Off IV fluids, s/p paracentesis 2.8 liters yesterday  Remains on fluid restriction at 1.8 L   Workup:   Urine Osmo 420-,urine sodium 69-suggestive of SIADH  serum osm-289  TSH 3.388, random cortisol at 5: 45 pm 9.6  Uric acid 4.7  Plan:  Continue holding diuretic for now  Discussed with primary team plan for albumin x 2 doses today with concerns for ongoing volume depletion  Continue FR 1.8 liters  Check BMP in am if remains admitted  As above if discharged will need to have follow-up with his primary nephrologist at [facility]  Anemia  Current hemoglobin 7.6  S/p 1 unit leukocyte reduced PRBC ordered per primary team  Patient has history of anemia  Monitor hemoglobin keep greater than 7  Management per primary team  Hypercalcemia  Admission corrected calcium 11.2  Likely in the setting of elevated Vit D and taking supplements along with volume depletion  Most recent corrected calcium 11.1  Hold  further vitamin D and calcium supplements  Monitor with albumin  Vitamin D deficiency  Vit D > 120  Has hypercalcemia as well on admission  Per [employer], patient on Cholecalciferol 50,000 weekly.  Hold vit D and calcium supplements for now  Monitor levels   Can take time to improve off treatment  Other cirrhosis of liver (HCC)  US right upper quadrant, 4/10/2025: Cirrhosis with severe hepatic steatosis and severe hepatomegaly with sequela of portal hypertension including moderate ascites   As above status post paracentesis for 2820 mL 4/22/2025  Recommend albumin for high-volume paracentesis  Pancreatic cyst  History of idiopathic pancreatitis  Presented with mild abdominal pain, currently denies pain  CT A/P wo:  6.5 cm cystic mass in the pancreatic  body/tail   Patient due for MRI/MRCP with contrast however due to BUD would hold off on any administration of IV contrast  Idiopathic pancreatitis  Gastroenterology following  Low bicarbonate  Likely in the setting of GI and liver disease  Improved and stable at 22  Monitor for now    Overall plan and recommendations has been reviewed with primary team and I agree with the plan as stated below  Renal function continues to remain stable off IV fluids  Since Blood Pressure soft and likely still somewhat volume depleted recommend albumin 25% 25 g 2 times today  For blood pressure support recommend low-dose midodrine given liver disease  If remains admitted recommend BMP in a.m.  Okay for discharge from nephrology standpoint as long as medically ready per primary team  Pt will re quire close follow-up with his primary nephrologist and [facility]    Review of Systems  Patient seen and examined at bedside.  Patient overall feels well.  Denies chest pain, shortness of breath, dizziness lightheadedness.  Denies abdominal discomfort feels belly is better post paracentesis.   Review of Systems   Constitutional: Negative.  Negative for activity change, appetite change, chills, diaphoresis, fatigue and fever.   HENT: Negative.  Negative for congestion and facial swelling.    Respiratory: Negative.     Cardiovascular: Negative.    Gastrointestinal: Negative.    Endocrine: Negative.    Genitourinary: Negative.    Musculoskeletal: Negative.    Skin: Negative.    Allergic/Immunologic: Negative.    Neurological: Negative.    Hematological: Negative.    Psychiatric/Behavioral: Negative.           Physical Exam:  Current Weight: Weight - Scale: 68.7 kg (151 lb 8 oz)  Vitals:    04/23/25 0217 04/23/25 0412 04/23/25 0707 04/23/25 0751   BP: (!) 91/46 (!) 92/48 (!) 87/44 98/53   BP Location: Left arm Right arm Left arm    Pulse: 75 74  82   Resp:   12    Temp: 98 °F (36.7 °C)  (!) 97.1 °F (36.2 °C)    TempSrc:   Oral    SpO2: 97% 95%  97%    Weight:       Height:           Physical Exam  Vitals and nursing note reviewed.   Constitutional:       Appearance: Normal appearance.   HENT:      Head: Normocephalic and atraumatic.      Nose: Nose normal.      Mouth/Throat:      Mouth: Mucous membranes are moist.      Pharynx: Oropharynx is clear.   Eyes:      Extraocular Movements: Extraocular movements intact.      Conjunctiva/sclera: Conjunctivae normal.   Cardiovascular:      Rate and Rhythm: Normal rate and regular rhythm.      Pulses: Normal pulses.      Heart sounds: Normal heart sounds.   Pulmonary:      Effort: Pulmonary effort is normal.      Breath sounds: Normal breath sounds.   Abdominal:      General: Bowel sounds are normal.      Palpations: Abdomen is soft.   Musculoskeletal:         General: Normal range of motion.      Cervical back: Normal range of motion and neck supple.   Skin:     General: Skin is warm.   Neurological:      General: No focal deficit present.      Mental Status: He is alert and oriented to person, place, and time.   Psychiatric:         Mood and Affect: Mood normal.         Behavior: Behavior normal.            Medications:    Current Facility-Administered Medications:     albumin human (FLEXBUMIN) 25 % injection 25 g, 25 g, Intravenous, BID, MARLIN Dunbar, 25 g at 04/23/25 1014    escitalopram (LEXAPRO) tablet 10 mg, 10 mg, Oral, Daily, Sudhir Mcdonald DO, 10 mg at 04/23/25 0946    heparin (porcine) subcutaneous injection 5,000 Units, 5,000 Units, Subcutaneous, Q8H [facility], Sudhir Mcdonald DO, 5,000 Units at 04/23/25 0634    [START ON 4/24/2025] midodrine (PROAMATINE) tablet 2.5 mg, 2.5 mg, Oral, 0200, Alexis Alvarez MD    multi-electrolyte (Plasmalyte-A/Isolyte-S PH 7.4/Normosol-R) IV bolus 1,000 mL, 1,000 mL, Intravenous, Once, Sudhir Mcdonald DO, Last Rate: 0 mL/hr at 04/19/25 1833, Restarted at 04/19/25 3029    pantoprazole (PROTONIX) injection 40 mg, 40 mg, Intravenous, Q12H [facility], Radha Srivastava MD, 40 mg at 04/23/25  "[id number]    [Held by provider] spironolactone (ALDACTONE) tablet 50 mg, 50 mg, Oral, Daily, Sudhir Mcdonald, DO    Laboratory Results:  Results from last 7 days   Lab Units 04/23/25  0841 04/22/25  1957 04/22/25  1228 04/21/25 2015 04/21/25  0815 04/21/25  0032 04/20/25  1745 04/20/25  0527 04/19/25  1038 04/19/25  1038 04/16/25  1215   WBC Thousand/uL 16.59*  --  15.24*  --  15.35*  --   --  17.75*  --  22.88*  --    HEMOGLOBIN g/dL 7.6* 7.3* 7.7* 7.6* 7.9* 7.1* 7.9* 6.8*   < > 7.7*  --    HEMATOCRIT % 23.3*  --  23.4*  --  23.4*  --   --  20.3*  --  23.6*  --    PLATELETS Thousands/uL 68*  --  64*  --  63*  --   --  64*  --  88*  --    SODIUM mmol/L 132*  --  130*  --  130*  --   --  131*  --  128* 128*   POTASSIUM mmol/L 4.1  --  3.9  --  3.6  --   --  4.0  --  4.3 4.5   CHLORIDE mmol/L 105  --  105  --  104  --   --  105  --  103 102   CO2 mmol/L 22  --  21  --  19*  --   --  19*  --  17* 17*   BUN mg/dL 16  --  15  --  15  --   --  16  --  16 17   CREATININE mg/dL 2.28*  --  2.40*  --  2.30*  --   --  2.47*  --  2.73* 2.72*   CALCIUM mg/dL 9.7  --  9.5  --  9.3  --   --  9.4  --  9.8 9.6   MAGNESIUM mg/dL 1.9  --   --   --   --   --   --  2.0  --  1.9  --    PHOSPHORUS mg/dL  --   --   --   --   --   --   --  2.9  --   --   --     < > = values in this interval not displayed.       Medical records have been reviewed through [facility] and care everywhere for this patient encounter    Portions of the record may have been created with voice recognition software. Occasional wrong word or \"sound a like\" substitutions may have occurred due to the inherent limitations of voice recognition software. Read the chart carefully and recognize,   "

## 2025-04-23 NOTE — ASSESSMENT & PLAN NOTE
Patient with past medical history of CKD  Patient follows with [facility] nephrology  Patient current creatinine is 2.30 and baseline was 1.7  Patient was started recently on spironolactone  Plan  Hold spironolactone  Avoid nephrotoxic agent   Nephrology following

## 2025-04-23 NOTE — ASSESSMENT & PLAN NOTE
Lab Results   Component Value Date    EGFR 31 04/23/2025    EGFR 29 04/22/2025    EGFR 30 04/21/2025    CREATININE 2.28 (H) 04/23/2025    CREATININE 2.40 (H) 04/22/2025    CREATININE 2.30 (H) 04/21/2025   Avoid nephrotoxic agents  Follow-up on creatinine  Urology following, appreciate recommendations  Urinary retention protocol  Creatinine is 2.47.  Nephrology follow-up.  Continue to hold spironolactone

## 2025-04-23 NOTE — ASSESSMENT & PLAN NOTE
Admission corrected calcium 11.2  Likely in the setting of elevated Vit D and taking supplements along with volume depletion  Most recent corrected calcium 11.1  Hold  further vitamin D and calcium supplements  Monitor with albumin

## 2025-04-23 NOTE — ASSESSMENT & PLAN NOTE
Etiology: multifactorial:  Prerenal given  poor oral intake + hypovolemia/dehydration with decreased effective volume history of cirrhosis, leading to ATN  Admission creatinine 2.73  Baseline creatinine 1.8-2.0 since 2024.  Prior to that 3.0-4.0 since 2023  [place] off IV fluids  Creatinine 2.28-seems to be fluctuating between 2.2 and 2.4  Not on ace/arb outpateint  Spironolactone currently on hold  Currently having some lower blood pressure readings  That is post paracentesis for 2820 ml yesterday  Workup:  UA: Trace blood and leukocytes, +1 protein, moderate epithelial cells, 20-30 hyaline casts, 3-5 granular casts, innumerable bacteria and mucus threads  Urine electrolytes: Urine chloride 88, urine creatinine 129, urine sodium 69, urine urea 367   FENa: 1.0% -indeterminate.  Can be seen with either prerenal or intrinsic BUD  FEUrea: 43.9% - >35% suggestive of intrinsic A  PCXR: Negative  4/19/2025 CT A/P wo: 5 mm nonobstructing calculus left mid kidney. 4 mm calculus left UPJ, without obstruction.   Plan:  Avoid all NSAIDs, nephrotoxic agents, as well as IV contrast  Avoid hypotension and any perturbations in blood pressure  Closely monitor I&O, daily weight and labs  Discussed with primary team-will give albumin 25% 25 g x 2 and primary to initiate midodrine 2.5 mg daily blood pressure support  Tentative discharge today   Recommend ongoing follow-up his primary nephrologist at [facility]

## 2025-04-23 NOTE — PROGRESS NOTES
Progress Note - Gastroenterology   Name: Sudhir Bach 55 y.o. male I MRN: [id number]  Unit/Bed#: -01 I Date of Admission: 4/19/2025   Date of Service: 4/23/2025 I Hospital Day: 4    Assessment & Plan  Other cirrhosis of liver (HCC)  Newly diagnosed. There is evidence of cirrhosis based on labs, imaging, and physical exam. Bilirubin 2.96. Albumin 1.9. INR 1.57. Platelets 64,000. RUQ US 4/10/2025 shows cirrhosis with severe steatosis, hepatomegaly, sequela of portal HTN including moderate ascites. CT A/P 4/19 again shows evidence of cirrhosis, moderate to large ascites. No significant alcohol use in the past. Extensive liver serologic workup significant for low ceruloplasmin suspicious for Roque's disease and elevated ferritin 1365, concern for hemochromatosis. Autoimmune and viral serologies negative. MELD 3.0 = 28 on 4/23 4/23: S/P 2.8L para 4/22 neg for SBP, hypotensive overnight BP 87/44, started on midodrine 2.5 qd and IV albumin x2 per nephrology  4/22: for paracentesis today, MRI with pancreatic cysts up to 5.7 cm, PDD, rec EUS as outpt    - Monitor MELD labs daily  - await HFE gene mutation, alpha 1 antitrypsin level normal  -await 24 hr urine copper r/o Roque's  -Dr. Mosqueda reviewed with Dr. Mota 4/21.  Patient with history of heavy alcohol and prior admissions for this, pt denies.  Reports was drinking socially up until 6 months ago  - Recommend close outpatient hepatology follow-up    Ascites:  -4/22 2.8 L para, neg for SBP, low protein, albumin < 1.5, cytology pending  -Recommend low sodium diet  -Hold spironolactone due to BUD    HE:  No overt s/sx at this time, monitor mental status    Screening for esophageal varices:  No evidence of varices on EGD from December 2024  - He will eventually need repeat EGD due to new hepatic decompensation    HCC screening  No evidence of hepatoma on RUQ ultrasound 4/10  BUD (acute kidney injury) (HCC)  BUD on CKD,Cr  2.73> 2.2 compared to baseline 1.8-2.0.  Patient recently initiated on spironolactone.    - Agree with holding spironolactone  - Appreciate nephrology recommendations  -monitor and correct Cr abd electrolytes  Anemia  Hemoglobin 7.7 on admission, down from 12.5 in September 2023. MCV high 114. B12 and folate WNL. Iron 43 and ferritin 1365. Recent EGD and colonoscopy from December 2024 significant for gastritis and mild Schatzki's ring, colon polyps, diverticulosis, hemorrhoids. No current signs of overt GI bleeding.    - Monitor H&H and transfuse to keep hemoglobin greater than 7 (received 1 u 4/20 for Hg 6.8), Hg stable 7.6  - Monitor for GI bleeding  - Patient will need repeat EGD to screen for esophageal varices given new decompensation  Pancreatic cyst  Patient with history of idiopathic pancreatitis. No evidence of pancreatitis at present time. CT shows apparent cystic mass in the pancreatic body/tail measuring approximately 6.5 x 4.3 cm and another 11 mm cyst along the medial pancreatic head. Suspect cysts may be pseudocysts related to prior episode of pancreatitis.    - MRI ABD:  5.7 and 2.2 cm panc cysts, possible pseudocysts  vs indeterminate, PD dilated and communicates with cyst, rec EUS as outpt      Chronic renal failure  Lab Results   Component Value Date    EGFR 31 04/23/2025    EGFR 29 04/22/2025    EGFR 30 04/21/2025    CREATININE 2.28 (H) 04/23/2025    CREATININE 2.40 (H) 04/22/2025    CREATININE 2.30 (H) 04/21/2025     Chronic renal impairment  Lab Results   Component Value Date    EGFR 31 04/23/2025    EGFR 29 04/22/2025    EGFR 30 04/21/2025    CREATININE 2.28 (H) 04/23/2025    CREATININE 2.40 (H) 04/22/2025    CREATININE 2.30 (H) 04/21/2025           Subjective   Patient seen and examined.  Tolerating diet.  Abdominal distention improved after 2.8 L paracentesis yesterday negative for SBP.  No confusion or asterixis.  Had some hypotension this morning, ordered for IV albumin x 2 and started on midodrine 2.5 daily by  nephrology.    Objective :  Temp:  [97.1 °F (36.2 °C)-98.4 °F (36.9 °C)] 97.1 °F (36.2 °C)  HR:  [74-83] 82  BP: (87-99)/(44-55) 98/53  Resp:  [12-16] 12  SpO2:  [93 %-97 %] 97 %  O2 Device: None (Room air)    Physical Exam  Constitutional: Well-developed, no acute distress  HEENT: normocephalic, mucous membranes moist.+ scleral icterus  Neck: Supple  Skin: warm and dry  Respiratory: Lungs are clear to auscultation B/L.  Cardiovascular: Heart is regular rate and rhythm.  Gastrointestinal: Soft, nontender, nondistended with normal active bowel sounds.  No masses, guarding, rebound.   Rectal Exam: Deferred.  Extremities: No edema.  Neurologic: Nonfocal. A & O ×3. No asterixis  Psychiatric: Normal affect.    Lab Results: I have reviewed the following results:CBC/BMP:   .     04/23/25  0841   WBC 16.59*   HGB 7.6*   HCT 23.3*   PLT 68*   SODIUM 132*   K 4.1      CO2 22   BUN 16   CREATININE 2.28*   GLUC 136   MG 1.9    , Creatinine Clearance: Estimated Creatinine Clearance: 35.4 mL/min (A) (by C-G formula based on SCr of 2.28 mg/dL (H))., LFTs:   .     04/23/25  0841   AST 39   ALT 29   ALB 2.3*   TBILI 3.78*   ALKPHOS 88    , PTT/INR:  .     04/23/25  0842   INR 1.47*

## 2025-04-23 NOTE — PLAN OF CARE
Problem: Potential for Falls  Goal: Patient will remain free of falls  Description: INTERVENTIONS:- Educate patient/family on patient safety including physical limitations- Instruct patient to call for assistance with activity - Consult OT/PT to assist with strengthening/mobility - Keep Call bell within reach- Keep bed low and locked with side rails adjusted as appropriate- Keep care items and personal belongings within reach- Initiate and maintain comfort rounds-  Make Fall Risk Sign visible to staff- Offer Toileting every 2 Hours, in advance of need- Patient signed fall precaution refusal form- Obtain necessary fall risk management equipment: walker if needed for ambulation. - Apply yellow socks and bracelet for high fall risk patients- Consider moving patient to room near nurses station  INTERVENTIONS:- Educate patient/family on patient safety including physical limitations- Instruct patient to call for assistance with activity - Consult OT/PT to assist with strengthening/mobility - Keep Call bell within reach- Keep bed low and locked with side rails adjusted as appropriate- Keep care items and personal belongings within reach- Initiate and maintain comfort rounds-  Make Fall Risk Sign visible to staff- Offer Toileting every 2 Hours, in advance of need- Patient signed fall precaution refusal form- Obtain necessary fall risk management equipment: walker if needed for ambulation. - Apply yellow socks and bracelet for high fall risk patients- Consider moving patient to room near nurses station  Outcome: Progressing     Problem: PAIN - ADULT  Goal: Verbalizes/displays adequate comfort level or baseline comfort level  Description: Interventions:- Encourage patient to monitor pain and request assistance- Assess pain using appropriate pain scale- Administer analgesics based on type and severity of pain and evaluate response- Implement non-pharmacological measures as appropriate and evaluate response- Consider cultural  and social influences on pain and pain management- Notify physician/advanced practitioner if interventions unsuccessful or patient reports new pain  Interventions:- Encourage patient to monitor pain and request assistance- Assess pain using appropriate pain scale- Administer analgesics based on type and severity of pain and evaluate response- Implement non-pharmacological measures as appropriate and evaluate response- Consider cultural and social influences on pain and pain management- Notify physician/advanced practitioner if interventions unsuccessful or patient reports new pain  Outcome: Progressing     Problem: DISCHARGE PLANNING  Goal: Discharge to home or other facility with appropriate resources  Description: INTERVENTIONS:- Identify barriers to discharge w/patient and caregiver- Arrange for needed discharge resources and transportation as appropriate- Identify discharge learning needs (meds, wound care, etc.)- Arrange for interpretive services to assist at discharge as needed- Refer to Case Management Department for coordinating discharge planning if the patient needs post-hospital services based on physician/advanced practitioner order or complex needs related to functional status, cognitive ability, or social support system  INTERVENTIONS:- Identify barriers to discharge w/patient and caregiver- Arrange for needed discharge resources and transportation as appropriate- Identify discharge learning needs (meds, wound care, etc.)- Arrange for interpretive services to assist at discharge as needed- Refer to Case Management Department for coordinating discharge planning if the patient needs post-hospital services based on physician/advanced practitioner order or complex needs related to functional status, cognitive ability, or social support system  Outcome: Progressing     Problem: Knowledge Deficit  Goal: Patient/family/caregiver demonstrates understanding of disease process, treatment plan, medications, and  discharge instructions  Description: Complete learning assessment and assess knowledge base.Interventions:- Provide teaching at level of understanding- Provide teaching via preferred learning methods  Complete learning assessment and assess knowledge base.Interventions:- Provide teaching at level of understanding- Provide teaching via preferred learning methods  Outcome: Progressing     Problem: INFECTION - ADULT  Goal: Absence or prevention of progression during hospitalization  Description: INTERVENTIONS:- Assess and monitor for signs and symptoms of infection- Monitor lab/diagnostic results- Monitor all insertion sites, i.e. indwelling lines, tubes, and drains- Monitor endotracheal if appropriate and nasal secretions for changes in amount and color- [place] appropriate cooling/warming therapies per order- Administer medications as ordered- Instruct and encourage patient and family to use good hand hygiene technique- Identify and instruct in appropriate isolation precautions for identified infection/condition  Outcome: Progressing  Goal: Absence of fever/infection during neutropenic period  Description: INTERVENTIONS:- Monitor WBC  Outcome: Progressing     Problem: SAFETY ADULT  Goal: Patient will remain free of falls  Description: INTERVENTIONS:- Educate patient/family on patient safety including physical limitations- Instruct patient to call for assistance with activity - Consult OT/PT to assist with strengthening/mobility - Keep Call bell within reach- Keep bed low and locked with side rails adjusted as appropriate- Keep care items and personal belongings within reach- Initiate and maintain comfort rounds-  Make Fall Risk Sign visible to staff- Offer Toileting every 2 Hours, in advance of need- Patient signed fall precaution refusal form- Obtain necessary fall risk management equipment: walker if needed for ambulation. - Apply yellow socks and bracelet for high fall risk patients- Consider moving patient to  room near nurses station  INTERVENTIONS:- Educate patient/family on patient safety including physical limitations- Instruct patient to call for assistance with activity - Consult OT/PT to assist with strengthening/mobility - Keep Call bell within reach- Keep bed low and locked with side rails adjusted as appropriate- Keep care items and personal belongings within reach- Initiate and maintain comfort rounds- Make Fall Risk Sign visible to staff- Offer Toileting every 2 Hours, in advance of need- Patient signed fall precaution refusal form- Obtain necessary fall risk management equipment: walker if needed for ambulation. - Apply yellow socks and bracelet for high fall risk patients- Consider moving patient to room near nurses station  Outcome: Progressing  Goal: Maintain or return to baseline ADL function  Description: INTERVENTIONS:-  Assess patient's ability to carry out ADLs; assess patient's baseline for ADL function and identify physical deficits which impact ability to perform ADLs (bathing, care of mouth/teeth, toileting, grooming, dressing, etc.)- Assess/evaluate cause of self-care deficits - Assess range of motion- Assess patient's mobility; develop plan if impaired- Assess patient's need for assistive devices and provide as appropriate- Encourage maximum independence but intervene and supervise when necessary- Involve family in performance of ADLs- Assess for home care needs following discharge - Consider OT consult to assist with ADL evaluation and planning for discharge- Provide patient education as appropriate  Outcome: Progressing  Goal: Maintains/Returns to pre admission functional level  Description: INTERVENTIONS:- Perform AM-PAC 6 Click Basic Mobility/ Daily Activity assessment daily.- Set and communicate daily mobility goal to care team and patient/family/caregiver. - Collaborate with rehabilitation services on mobility goals if consulted- Patient ambulates and repositions independently in  room.  Outcome: Progressing     Problem: Nutrition/Hydration-ADULT  Goal: Nutrient/Hydration intake appropriate for improving, restoring or maintaining nutritional needs  Description: Monitor and assess patient's nutrition/hydration status for malnutrition. Collaborate with interdisciplinary team and initiate plan and interventions as ordered.  Monitor patient's weight and dietary intake as ordered or per policy. Utilize nutrition screening tool and intervene as necessary. Determine patient's food preferences and provide high-protein, high-caloric foods as appropriate. INTERVENTIONS:- Monitor oral intake, urinary output, labs, and treatment plans- Assess nutrition and hydration status and recommend course of action- Evaluate amount of meals eaten- Assist patient with eating if necessary - Allow adequate time for meals- Recommend/ encourage appropriate diets, oral nutritional supplements, and vitamin/mineral supplements- Order, calculate, and assess calorie counts as needed- Recommend, monitor, and adjust tube feedings and TPN/PPN based on assessed needs- Assess need for intravenous fluids- Provide specific nutrition/hydration education as appropriate- Include patient/family/caregiver in decisions related to nutrition  Outcome: Progressing     Problem: Prexisting or High Potential for Compromised Skin Integrity  Goal: Skin integrity is maintained or improved  Description: INTERVENTIONS:- Identify patients at risk for skin breakdown- Assess and monitor skin integrity- Assess and monitor nutrition and hydration status- Monitor labs - Assess for incontinence - Turn and reposition patient- Assist with mobility/ambulation- Relieve pressure over bony prominences- Avoid friction and shearing- Provide appropriate hygiene as needed including keeping skin clean and dry- Evaluate need for skin moisturizer/barrier cream- Collaborate with interdisciplinary team - Patient/family teaching- Consider wound care consult   Outcome:  Progressing     Problem: GASTROINTESTINAL - ADULT  Goal: Minimal or absence of nausea and/or vomiting  Description: INTERVENTIONS:- Administer IV fluids if ordered to ensure adequate hydration- Maintain NPO status until nausea and vomiting are resolved- Nasogastric tube if ordered- Administer ordered antiemetic medications as needed- Provide nonpharmacologic comfort measures as appropriate- Advance diet as tolerated, if ordered- Consider nutrition services referral to assist patient with adequate nutrition and appropriate food choices  Outcome: Progressing  Goal: Maintains or returns to baseline bowel function  Description: INTERVENTIONS:- Assess bowel function- Encourage oral fluids to ensure adequate hydration- Administer IV fluids if ordered to ensure adequate hydration- Administer ordered medications as needed- Encourage mobilization and activity- Consider nutritional services referral to assist patient with adequate nutrition and appropriate food choices  Outcome: Progressing  Goal: Maintains adequate nutritional intake  Description: INTERVENTIONS:- Monitor percentage of each meal consumed- Identify factors contributing to decreased intake, treat as appropriate- Assist with meals as needed- Monitor I&O, weight, and lab values if indicated- Obtain nutrition services referral as needed  Outcome: Progressing  Goal: Establish and maintain optimal ostomy function  Description: INTERVENTIONS:- Assess bowel function- Encourage oral fluids to ensure adequate hydration- Administer IV fluids if ordered to ensure adequate hydration - Administer ordered medications as needed- Encourage mobilization and activity- Nutrition services referral to assist patient with appropriate food choices- Assess stoma site- Consider wound care consult   Outcome: Progressing  Goal: Oral mucous membranes remain intact  Description: INTERVENTIONS- Assess oral mucosa and hygiene practices- Implement preventative oral hygiene regimen- Implement  oral medicated treatments as ordered- Initiate Nutrition services referral as needed  Outcome: Progressing     Problem: METABOLIC, FLUID AND ELECTROLYTES - ADULT  Goal: Electrolytes maintained within normal limits  Description: INTERVENTIONS:- Monitor labs and assess patient for signs and symptoms of electrolyte imbalances- Administer electrolyte replacement as ordered- Monitor response to electrolyte replacements, including repeat lab results as appropriate- Instruct patient on fluid and nutrition as appropriate  Outcome: Progressing  Goal: Fluid balance maintained  Description: INTERVENTIONS:- Monitor labs - Monitor I/O and WT- Instruct patient on fluid and nutrition as appropriate- Assess for signs & symptoms of volume excess or deficit  Outcome: Progressing  Goal: Glucose maintained within target range  Description: INTERVENTIONS:- Monitor Blood Glucose as ordered- Assess for signs and symptoms of hyperglycemia and hypoglycemia- Administer ordered medications to maintain glucose within target range- Assess nutritional intake and initiate nutrition service referral as needed  Outcome: Progressing     Problem: HEMATOLOGIC - ADULT  Goal: Maintains hematologic stability  Description: INTERVENTIONS- Assess for signs and symptoms of bleeding or hemorrhage- Monitor labs- Administer supportive blood products/factors as ordered and appropriate  Outcome: Progressing

## 2025-04-23 NOTE — PLAN OF CARE
Problem: Potential for Falls  Goal: Patient will remain free of falls  Description: INTERVENTIONS:- Educate patient/family on patient safety including physical limitations- Instruct patient to call for assistance with activity - Consult OT/PT to assist with strengthening/mobility - Keep Call bell within reach- Keep bed low and locked with side rails adjusted as appropriate- Keep care items and personal belongings within reach- Initiate and maintain comfort rounds- Make Fall Risk Sign visible to staff- Offer Toileting every  Hours, in advance of need- Initiate/Maintain alarm- Obtain necessary fall risk management equipment: - Apply yellow socks and bracelet for high fall risk patients- Consider moving patient to room near nurses station  INTERVENTIONS:- Educate patient/family on patient safety including physical limitations- Instruct patient to call for assistance with activity - Consult OT/PT to assist with strengthening/mobility - Keep Call bell within reach- Keep bed low and locked with side rails adjusted as appropriate- Keep care items and personal belongings within reach- Initiate and maintain comfort rounds- Make Fall Risk Sign visible to staff- Offer Toileting every  Hours, in advance of need- Initiate/Maintain alarm- Obtain necessary fall risk management equipment: - Apply yellow socks and bracelet for high fall risk patients- Consider moving patient to room near nurses station  Outcome: Progressing     Problem: PAIN - ADULT  Goal: Verbalizes/displays adequate comfort level or baseline comfort level  Description: Interventions:- Encourage patient to monitor pain and request assistance- Assess pain using appropriate pain scale- Administer analgesics based on type and severity of pain and evaluate response- Implement non-pharmacological measures as appropriate and evaluate response- Consider cultural and social influences on pain and pain management- Notify physician/advanced practitioner if interventions  unsuccessful or patient reports new pain  Interventions:- Encourage patient to monitor pain and request assistance- Assess pain using appropriate pain scale- Administer analgesics based on type and severity of pain and evaluate response- Implement non-pharmacological measures as appropriate and evaluate response- Consider cultural and social influences on pain and pain management- Notify physician/advanced practitioner if interventions unsuccessful or patient reports new pain  Outcome: Progressing     Problem: DISCHARGE PLANNING  Goal: Discharge to home or other facility with appropriate resources  Description: INTERVENTIONS:- Identify barriers to discharge w/patient and caregiver- Arrange for needed discharge resources and transportation as appropriate- Identify discharge learning needs (meds, wound care, etc.)- Arrange for interpretive services to assist at discharge as needed- Refer to Case Management Department for coordinating discharge planning if the patient needs post-hospital services based on physician/advanced practitioner order or complex needs related to functional status, cognitive ability, or social support system  INTERVENTIONS:- Identify barriers to discharge w/patient and caregiver- Arrange for needed discharge resources and transportation as appropriate- Identify discharge learning needs (meds, wound care, etc.)- Arrange for interpretive services to assist at discharge as needed- Refer to Case Management Department for coordinating discharge planning if the patient needs post-hospital services based on physician/advanced practitioner order or complex needs related to functional status, cognitive ability, or social support system  Outcome: Progressing     Problem: Knowledge Deficit  Goal: Patient/family/caregiver demonstrates understanding of disease process, treatment plan, medications, and discharge instructions  Description: Complete learning assessment and assess knowledge base.Interventions:-  Provide teaching at level of understanding- Provide teaching via preferred learning methods  Complete learning assessment and assess knowledge base.Interventions:- Provide teaching at level of understanding- Provide teaching via preferred learning methods  Outcome: Progressing     Problem: INFECTION - ADULT  Goal: Absence or prevention of progression during hospitalization  Description: INTERVENTIONS:- Assess and monitor for signs and symptoms of infection- Monitor lab/diagnostic results- Monitor all insertion sites, i.e. indwelling lines, tubes, and drains- Monitor endotracheal if appropriate and nasal secretions for changes in amount and color- [place] appropriate cooling/warming therapies per order- Administer medications as ordered- Instruct and encourage patient and family to use good hand hygiene technique- Identify and instruct in appropriate isolation precautions for identified infection/condition  Outcome: Progressing  Goal: Absence of fever/infection during neutropenic period  Description: INTERVENTIONS:- Monitor WBC  Outcome: Progressing     Problem: SAFETY ADULT  Goal: Patient will remain free of falls  Description: INTERVENTIONS:- Educate patient/family on patient safety including physical limitations- Instruct patient to call for assistance with activity - Consult OT/PT to assist with strengthening/mobility - Keep Call bell within reach- Keep bed low and locked with side rails adjusted as appropriate- Keep care items and personal belongings within reach- Initiate and maintain comfort rounds- Make Fall Risk Sign visible to staff- Offer Toileting every  Hours, in advance of need- Initiate/Maintain alarm- Obtain necessary fall risk management equipment: - Apply yellow socks and bracelet for high fall risk patients- Consider moving patient to room near nurses station  INTERVENTIONS:- Educate patient/family on patient safety including physical limitations- Instruct patient to call for assistance with  activity - Consult OT/PT to assist with strengthening/mobility - Keep Call bell within reach- Keep bed low and locked with side rails adjusted as appropriate- Keep care items and personal belongings within reach- Initiate and maintain comfort rounds- Make Fall Risk Sign visible to staff- Offer Toileting every  Hours, in advance of need- Initiate/Maintain alarm- Obtain necessary fall risk management equipment: - Apply yellow socks and bracelet for high fall risk patients- Consider moving patient to room near nurses station  Outcome: Progressing  Goal: Maintain or return to baseline ADL function  Description: INTERVENTIONS:-  Assess patient's ability to carry out ADLs; assess patient's baseline for ADL function and identify physical deficits which impact ability to perform ADLs (bathing, care of mouth/teeth, toileting, grooming, dressing, etc.)- Assess/evaluate cause of self-care deficits - Assess range of motion- Assess patient's mobility; develop plan if impaired- Assess patient's need for assistive devices and provide as appropriate- Encourage maximum independence but intervene and supervise when necessary- Involve family in performance of ADLs- Assess for home care needs following discharge - Consider OT consult to assist with ADL evaluation and planning for discharge- Provide patient education as appropriate  Outcome: Progressing  Goal: Maintains/Returns to pre admission functional level  Description: INTERVENTIONS:- Perform AM-PAC 6 Click Basic Mobility/ Daily Activity assessment daily.- Set and communicate daily mobility goal to care team and patient/family/caregiver. - Collaborate with rehabilitation services on mobility goals if consulted- Perform Range of Motion  times a day.- Reposition patient every  hours.- Dangle patient  times a day- Stand patient  times a day- Ambulate patient  times a day- Out of bed to chair  times a day - Out of bed for meals times a day- Out of bed for toileting- Record patient  progress and toleration of activity level   Outcome: Progressing     Problem: Nutrition/Hydration-ADULT  Goal: Nutrient/Hydration intake appropriate for improving, restoring or maintaining nutritional needs  Description: Monitor and assess patient's nutrition/hydration status for malnutrition. Collaborate with interdisciplinary team and initiate plan and interventions as ordered.  Monitor patient's weight and dietary intake as ordered or per policy. Utilize nutrition screening tool and intervene as necessary. Determine patient's food preferences and provide high-protein, high-caloric foods as appropriate. INTERVENTIONS:- Monitor oral intake, urinary output, labs, and treatment plans- Assess nutrition and hydration status and recommend course of action- Evaluate amount of meals eaten- Assist patient with eating if necessary - Allow adequate time for meals- Recommend/ encourage appropriate diets, oral nutritional supplements, and vitamin/mineral supplements- Order, calculate, and assess calorie counts as needed- Recommend, monitor, and adjust tube feedings and TPN/PPN based on assessed needs- Assess need for intravenous fluids- Provide specific nutrition/hydration education as appropriate- Include patient/family/caregiver in decisions related to nutrition  Outcome: Progressing     Problem: Prexisting or High Potential for Compromised Skin Integrity  Goal: Skin integrity is maintained or improved  Description: INTERVENTIONS:- Identify patients at risk for skin breakdown- Assess and monitor skin integrity- Assess and monitor nutrition and hydration status- Monitor labs - Assess for incontinence - Turn and reposition patient- Assist with mobility/ambulation- Relieve pressure over bony prominences- Avoid friction and shearing- Provide appropriate hygiene as needed including keeping skin clean and dry- Evaluate need for skin moisturizer/barrier cream- Collaborate with interdisciplinary team - Patient/family teaching-  Consider wound care consult   Outcome: Progressing     Problem: GASTROINTESTINAL - ADULT  Goal: Minimal or absence of nausea and/or vomiting  Description: INTERVENTIONS:- Administer IV fluids if ordered to ensure adequate hydration- Maintain NPO status until nausea and vomiting are resolved- Nasogastric tube if ordered- Administer ordered antiemetic medications as needed- Provide nonpharmacologic comfort measures as appropriate- Advance diet as tolerated, if ordered- Consider nutrition services referral to assist patient with adequate nutrition and appropriate food choices  Outcome: Progressing  Goal: Maintains or returns to baseline bowel function  Description: INTERVENTIONS:- Assess bowel function- Encourage oral fluids to ensure adequate hydration- Administer IV fluids if ordered to ensure adequate hydration- Administer ordered medications as needed- Encourage mobilization and activity- Consider nutritional services referral to assist patient with adequate nutrition and appropriate food choices  Outcome: Progressing  Goal: Maintains adequate nutritional intake  Description: INTERVENTIONS:- Monitor percentage of each meal consumed- Identify factors contributing to decreased intake, treat as appropriate- Assist with meals as needed- Monitor I&O, weight, and lab values if indicated- Obtain nutrition services referral as needed  Outcome: Progressing  Goal: Establish and maintain optimal ostomy function  Description: INTERVENTIONS:- Assess bowel function- Encourage oral fluids to ensure adequate hydration- Administer IV fluids if ordered to ensure adequate hydration - Administer ordered medications as needed- Encourage mobilization and activity- Nutrition services referral to assist patient with appropriate food choices- Assess stoma site- Consider wound care consult   Outcome: Progressing  Goal: Oral mucous membranes remain intact  Description: INTERVENTIONS- Assess oral mucosa and hygiene practices- Implement  preventative oral hygiene regimen- Implement oral medicated treatments as ordered- Initiate Nutrition services referral as needed  Outcome: Progressing     Problem: METABOLIC, FLUID AND ELECTROLYTES - ADULT  Goal: Electrolytes maintained within normal limits  Description: INTERVENTIONS:- Monitor labs and assess patient for signs and symptoms of electrolyte imbalances- Administer electrolyte replacement as ordered- Monitor response to electrolyte replacements, including repeat lab results as appropriate- Instruct patient on fluid and nutrition as appropriate  Outcome: Progressing  Goal: Fluid balance maintained  Description: INTERVENTIONS:- Monitor labs - Monitor I/O and WT- Instruct patient on fluid and nutrition as appropriate- Assess for signs & symptoms of volume excess or deficit  Outcome: Progressing  Goal: Glucose maintained within target range  Description: INTERVENTIONS:- Monitor Blood Glucose as ordered- Assess for signs and symptoms of hyperglycemia and hypoglycemia- Administer ordered medications to maintain glucose within target range- Assess nutritional intake and initiate nutrition service referral as needed  Outcome: Progressing     Problem: HEMATOLOGIC - ADULT  Goal: Maintains hematologic stability  Description: INTERVENTIONS- Assess for signs and symptoms of bleeding or hemorrhage- Monitor labs- Administer supportive blood products/factors as ordered and appropriate  Outcome: Progressing

## 2025-04-23 NOTE — ASSESSMENT & PLAN NOTE
Patient was past medical history of transaminitis  Patient presents with generalized weakness and lethargy  Patient follow-up with gastroenterology  Was concern for cirrhosis probably due to [place] due to new onset ascites and abnormal liver function test as patient denied any alcohol intake  Patient was seen recently by gastroenterology and autoimmune, viral serology, ceruloplasmin, iron panel were ordered  Chronic viral serology are nonreactive  Ceruloplasmin is 9.2  Patient recent labs showed worsening kidney function with creatinine of 2.73, AST 61, sodium 128, T. bili 3.77, INR 1.4  Patient was referred to the ED for extensive workup  Patient presented with weakness  GI recommended starting low-dose of spironolactone  Plan  Consult gastroenterology appreciate recommendations  Will hold spironolactone in the setting of BUD  Trend MELD labs  Patient underwent paracentesis with IR and 2820 cc of clear yellow fluid drained  Follow-up culture results  GI recommended no need for liver biopsy as inpatient and they will follow-up as outpatient and determine if liver biopsy is needed  24-hour urine collection for copper to rule out Roque's  Await HFE gene mutation, alpha 1 antitrypsin level  GI follow-up  Nephrology ordered albumin x 2 doses and started on midodrine 2.5 mg daily

## 2025-04-23 NOTE — ASSESSMENT & PLAN NOTE
Current hemoglobin 7.6  S/p 1 unit leukocyte reduced PRBC ordered per primary team  Patient has history of anemia  Monitor hemoglobin keep greater than 7  Management per primary team

## 2025-04-23 NOTE — ASSESSMENT & PLAN NOTE
Etiology: suspect prior BUD episode requiring RRT in 2018   Baseline creatinine 1.8-2.0 dating back to 2024; has had fluctuating creatinine 3-4.0 though 2023  Patient follows outpatient with [facility] nephrology. Nancy Yeung  Recommend follow up with primary nephrologist as outpatient on discharge

## 2025-04-23 NOTE — PROGRESS NOTES
Progress Note - Hospitalist   Name: Sudhir Bach 55 y.o. male I MRN: [id number]  Unit/Bed#: -01 I Date of Admission: 4/19/2025   Date of Service: 4/23/2025 I Hospital Day: 4    Assessment & Plan  Other cirrhosis of liver (HCC)  Patient was past medical history of transaminitis  Patient presents with generalized weakness and lethargy  Patient follow-up with gastroenterology  Was concern for cirrhosis probably due to WINCHESTER due to new onset ascites and abnormal liver function test as patient denied any alcohol intake  Patient was seen recently by gastroenterology and autoimmune, viral serology, ceruloplasmin, iron panel were ordered  Chronic viral serology are nonreactive  Ceruloplasmin is 9.2  Patient recent labs showed worsening kidney function with creatinine of 2.73, AST 61, sodium 128, T. bili 3.77, INR 1.4  Patient was referred to the ED for extensive workup  Patient presented with weakness  GI recommended starting low-dose of spironolactone  Plan  Consult gastroenterology appreciate recommendations  Will hold spironolactone in the setting of BUD  Trend MELD labs  Patient underwent paracentesis with IR and 2820 cc of clear yellow fluid drained  Follow-up culture results  GI recommended no need for liver biopsy as inpatient and they will follow-up as outpatient and determine if liver biopsy is needed  24-hour urine collection for copper to rule out Roque's  Await HFE gene mutation, alpha 1 antitrypsin level  GI follow-up  Nephrology ordered albumin x 2 doses and started on midodrine 2.5 mg daily  BUD (acute kidney injury) (HCC)  Patient with past medical history of CKD  Patient follows with [facility]  Patient current creatinine is 2.30 and baseline was 1.7  Patient was started recently on spironolactone  Plan  Hold spironolactone  Avoid nephrotoxic agent   Nephrology following  Pancreatic cyst  Patient with history of idiopathic pancreatitis  Patient presented with mild abdominal pain  Patient  with elevated T. bili and jaundice on exam  CT abdomen pelvis without contrast 6.5 cm cystic mass in the pancreatic body/tail, indeterminate. Additional 11 mm pancreatic head cyst.   MRCP pending  SIRS (systemic inflammatory response syndrome) ([facility])  Patient met SIRS criteria through leukocytosis and tachypnea  She denied any fever, burning urination  UA showed 2-4 white cell count  No signs of infection  Will continue to monitor off antibiotic  Blood cultures are negative to date  Weight loss  Patient presented with weight loss  Patient reported loss of 19 pounds in the last 2 weeks  Patient also complained of weakness, lethargy  Patient had recent EGD and colonoscopy were unremarkable  In the setting of elevated liver enzymes and BUD    Mri abdomen pending  History of CVA (cerebrovascular accident)  Patient with past medical history of stroke with no residual deficits  Currently on statin  Will hold statin in the setting of elevated liver enzymes  Mixed hyperlipidemia  On statin  Will hold statin due to elevated liver enzymes  Anemia  Patient has history of anemia  Iron is 43 and ferritin 1365  Probably due to anemia of chronic disease plus iron deficiency  Follow-up on nephrology recommendations  Maintain hemoglobin above 7 and transfuse as needed  Status post 1 PRBC transfusion  Current hemoglobin 7.9  Monitor hemoglobin q12   CKD (chronic kidney disease) stage 4, GFR 15-29 ml/min ([facility])  Lab Results   Component Value Date    EGFR 31 04/23/2025    EGFR 29 04/22/2025    EGFR 30 04/21/2025    CREATININE 2.28 (H) 04/23/2025    CREATININE 2.40 (H) 04/22/2025    CREATININE 2.30 (H) 04/21/2025   Avoid nephrotoxic agents  Follow-up on creatinine  Urology following, appreciate recommendations  Urinary retention protocol  Creatinine is 2.47.  Nephrology follow-up.  Continue to hold spironolactone  Vitamin D deficiency  On vitamin D once weekly    Idiopathic pancreatitis  Patient with past medical history of idiopathic  "pancreatitis    CT abdomen/pelvis-  6.5 cm cystic mass in the pancreatic body/tail, indeterminate. Additional 11 mm pancreatic head cyst.     Lipase-normal  MRI/MRCP abdomen-\"No evidence of choledocholithiasis.Cholelithiasis with pericholecystic fluid,   5.7 x 5.2 cm cyst in relation to the distal body/tail of the pancreas communicating with the dorsal pancreatic duct, suggest pancreatic pseudocyst in the context of patient having a previous bout of pancreatitis, other etiologies could include IPMN.Additional 2.2 cm cyst in the head of the pancreas with thick walls, indeterminate may be sequela of prior pancreatitis.The pancreatic lesions can be assessed with the EUS, These are  not described on the previous study performed at outside facility in 2023.Hepatic steatosis\"    Hyponatremia  Mental status is baseline  IV fluids as per nephrology  Serial BMPs  Hyponatremia work  Hypercalcemia    Low bicarbonate    Chronic renal failure  Lab Results   Component Value Date    EGFR 31 04/23/2025    EGFR 29 04/22/2025    EGFR 30 04/21/2025    CREATININE 2.28 (H) 04/23/2025    CREATININE 2.40 (H) 04/22/2025    CREATININE 2.30 (H) 04/21/2025     Chronic renal impairment  Lab Results   Component Value Date    EGFR 31 04/23/2025    EGFR 29 04/22/2025    EGFR 30 04/21/2025    CREATININE 2.28 (H) 04/23/2025    CREATININE 2.40 (H) 04/22/2025    CREATININE 2.30 (H) 04/21/2025       Labs & Imaging: Results Review Statement: No pertinent imaging studies reviewed.    VTE Prophylaxis: in place.    Code Status:   Level 1 - Full Code    Patient Centered Rounds: I have performed bedside rounds with nursing staff today.    Mobility:   Basic Mobility Inpatient Raw Score: 24  JH-HLM Goal: 8: Walk 250 feet or more  JH-HLM Achieved: 8: Walk 250 feet ot more  JH-HLM Goal achieved. Continue to encourage appropriate mobility.    Discussions with Specialists or Other Care Team Provider: Nephrology    Education and Discussions with Family / Patient: " "Declined update    Total Time Spent on Date of Encounter in care of patient: 35 mins. This time was spent on one or more of the following: performing physical exam; counseling and coordination of care; obtaining or reviewing history; documenting in the medical record; reviewing/ordering tests, medications or procedures; communicating with other healthcare professionals and discussing with patient's family/caregivers.    Current Length of Stay: 4 day(s)    Current Patient Status: Inpatient   Certification Statement: The patient will continue to require additional inpatient hospital stay due to see my assessment and plan.     Subjective:   Patient is seen and examined at bedside.  No new complaints.  Afebrile  All other ROS are negative.    Objective:    Vitals: Blood pressure 98/53, pulse 82, temperature (!) 97.1 °F (36.2 °C), temperature source Oral, resp. rate 12, height 5' 8\" (1.727 m), weight 68.7 kg (151 lb 8 oz), SpO2 97%.,Body mass index is 23.04 kg/m².  SPO2 RA Rest      Flowsheet Row ED to Hosp-Admission (Current) from 4/19/2025 in [facility] Med Surg Unit   SpO2 97 %   SpO2 Activity At Rest   O2 Device None (Room air)   O2 Flow Rate --          I&O:   Intake/Output Summary (Last 24 hours) at 4/23/2025 1230  Last data filed at 4/23/2025 0819  Gross per 24 hour   Intake 771 ml   Output 150 ml   Net 621 ml       Physical Exam:    General- Alert, lying comfortably in bed. Not in any acute distress.  Neck- Supple, No JVD  CVS- regular, S1 and S2 normal  Chest- Bilateral Air entry, No rhochi, crackles or wheezing present.  Abdomen- soft, nontender, not distended, no guarding or rigidity, BS+  Extremities-  No pedal edema, No calf tenderness                         Normal ROM in all extremities.  CNS-   Alert, awake and orientedx3. No focal deficits present.    Invasive Devices       Peripheral Intravenous Line  Duration             Peripheral IV 04/19/25 Left;Ventral (anterior) Forearm 3 days "                          Social History  reviewed  Family History   Problem Relation Age of Onset    Hypertension Father     Colon cancer Neg Hx     reviewed    Meds:  Current Facility-Administered Medications   Medication Dose Route Frequency Provider Last Rate Last Admin    albumin human (FLEXBUMIN) 25 % injection 25 g  25 g Intravenous BID Idalia Obrien ALEXARUDI   25 g at 04/23/25 1014    escitalopram (LEXAPRO) tablet 10 mg  10 mg Oral Daily Sudhir Mcdonald DO   10 mg at 04/23/25 0946    heparin (porcine) subcutaneous injection 5,000 Units  5,000 Units Subcutaneous Q8H [facility] Sudhir Mcdonald DO   5,000 Units at 04/23/25 0634    [START ON 4/24/2025] midodrine (PROAMATINE) tablet 2.5 mg  2.5 mg Oral 0200 Alexis Alvarez MD        multi-electrolyte (Plasmalyte-A/Isolyte-S PH 7.4/Normosol-R) IV bolus 1,000 mL  1,000 mL Intravenous Once Sudhir Mcdonald DO 0 mL/hr at 04/19/25 1833 Restarted at 04/19/25 2359    pantoprazole (PROTONIX) injection 40 mg  40 mg Intravenous Q12H [facility] Radha Srivastava MD   40 mg at 04/23/25 0946    [Held by provider] spironolactone (ALDACTONE) tablet 50 mg  50 mg Oral Daily Sudhir Mcdonald DO            Medications Prior to Admission:     Cholecalciferol (Vitamin D3) 1.25 MG (38683 UT) CAPS    escitalopram (LEXAPRO) 10 mg tablet    pantoprazole (PROTONIX) 40 mg tablet    rosuvastatin (CRESTOR) 10 MG tablet    spironolactone (ALDACTONE) 50 mg tablet    potassium chloride (Klor-Con M20) 20 mEq tablet    Labs:  Results from last 7 days   Lab Units 04/23/25  0841 04/22/25 1957 04/22/25  1228 04/21/25 2015 04/21/25  0815   WBC Thousand/uL 16.59*  --  15.24*  --  15.35*   HEMOGLOBIN g/dL 7.6* 7.3* 7.7*   < > 7.9*   HEMATOCRIT % 23.3*  --  23.4*  --  23.4*   PLATELETS Thousands/uL 68*  --  64*  --  63*   SEGS PCT % 70  --  75  --  70   LYMPHO PCT % 20  --  16  --  19   MONO PCT % 8  --  8  --  9   EOS PCT % 1  --  0  --  1    < > = values in this interval not displayed.     Results from last 7 days   Lab  Units 04/23/25  0841 04/22/25  1228 04/21/25  0815   POTASSIUM mmol/L 4.1 3.9 3.6   CHLORIDE mmol/L 105 105 104   CO2 mmol/L 22 21 19*   BUN mg/dL 16 15 15   CREATININE mg/dL 2.28* 2.40* 2.30*   CALCIUM mg/dL 9.7 9.5 9.3   ALK PHOS U/L 88 106* 102   ALT U/L 29 34 31   AST U/L 39 44* 39     Lab Results   Component Value Date    TROPONINI <0.02 11/11/2018     Results from last 7 days   Lab Units 04/23/25  0842 04/21/25  0815 04/20/25  0527   INR  1.47* 1.47* 1.57*     Lab Results   Component Value Date    BLOODCX No Growth at 72 hrs. 04/19/2025    BLOODCX No Growth at 72 hrs. 04/19/2025         Imaging:  Results for orders placed during the hospital encounter of 04/19/25    XR chest 1 view portable    Narrative  XR CHEST PORTABLE    INDICATION: 2-week history of weakness, fatigue, and abdominal pain.    COMPARISON: None    FINDINGS:    Clear lungs. No pneumothorax or pleural effusion.    Normal cardiomediastinal silhouette.    Bones are unremarkable for age.    Normal upper abdomen.    Impression  No acute cardiopulmonary disease.        Resident: Filipe Valenzuela I, the attending radiologist, have reviewed the images and agree with the final report above.    Workstation performed: [id number]    No results found for this or any previous visit.      Last 24 Hours Medication List:   Current Facility-Administered Medications   Medication Dose Route Frequency Provider Last Rate    Albumin 25%  25 g Intravenous BID MARLIN Dunbar      escitalopram  10 mg Oral Daily Sudhir Mcdonald DO      heparin (porcine)  5,000 Units Subcutaneous Q8H [facility] Sudhir Mcdonald DO      [START ON 4/24/2025] midodrine  2.5 mg Oral 0200 Alexis Alvarez MD      multi-electrolyte  1,000 mL Intravenous Once Sudhir Mcdonald DO 0 mL (04/19/25 1833)    pantoprazole  40 mg Intravenous Q12H [facility] Radha Srivastava MD      [Held by provider] spironolactone  50 mg Oral Daily Sudhir Mcdonald DO          Today, Patient Was Seen By: Alexis Alvarez MD    **  Please Note: Dictation voice to text software may have been used in the creation of this document. **

## 2025-04-23 NOTE — ASSESSMENT & PLAN NOTE
Lab Results   Component Value Date    EGFR 31 04/23/2025    EGFR 29 04/22/2025    EGFR 30 04/21/2025    CREATININE 2.28 (H) 04/23/2025    CREATININE 2.40 (H) 04/22/2025    CREATININE 2.30 (H) 04/21/2025

## 2025-04-23 NOTE — ASSESSMENT & PLAN NOTE
Newly diagnosed. There is evidence of cirrhosis based on labs, imaging, and physical exam. Bilirubin 2.96. Albumin 1.9. INR 1.57. Platelets 64,000. RUQ US 4/10/2025 shows cirrhosis with severe steatosis, hepatomegaly, sequela of portal HTN including moderate ascites. CT A/P 4/19 again shows evidence of cirrhosis, moderate to large ascites. No significant alcohol use in the past. Extensive liver serologic workup significant for low ceruloplasmin suspicious for Roque's disease and elevated ferritin 1365, concern for hemochromatosis. Autoimmune and viral serologies negative. MELD 3.0 = 28 on 4/23 4/23: S/P 2.8L para 4/22 neg for SBP, hypotensive overnight BP 87/44, started on midodrine 2.5 qd and IV albumin x2 per nephrology  4/22: for paracentesis today, MRI with pancreatic cysts up to 5.7 cm, PDD, rec EUS as outpt    - Monitor MELD labs daily  - await HFE gene mutation, alpha 1 antitrypsin level normal  -await 24 hr urine copper r/o Roque's  -Dr. Mosqueda reviewed with Dr. Mota 4/21.  Patient with history of heavy alcohol and prior admissions for this, pt denies.  Reports was drinking socially up until 6 months ago  - Recommend close outpatient hepatology follow-up    Ascites:  -4/22 2.8 L para, neg for SBP, low protein, albumin < 1.5, cytology pending  -Recommend low sodium diet  -Hold spironolactone due to BUD    HE:  No overt s/sx at this time, monitor mental status    Screening for esophageal varices:  No evidence of varices on EGD from December 2024  - He will eventually need repeat EGD due to new hepatic decompensation    HCC screening  No evidence of hepatoma on RUQ ultrasound 4/10

## 2025-04-23 NOTE — ASSESSMENT & PLAN NOTE
Etiology: likely d/t poor oral intake + diuretic use + hypovolemic along with component of SIADH  Admission sodium was 128  Current sodium 132  Off IV fluids, s/p paracentesis 2.8 liters yesterday  Remains on fluid restriction at 1.8 L   Workup:   Urine Osmo 420-,urine sodium 69-suggestive of SIADH  serum osm-289  TSH 3.388, random cortisol at 5: 45 pm 9.6  Uric acid 4.7  Plan:  Continue holding diuretic for now  Discussed with primary team plan for albumin x 2 doses today with concerns for ongoing volume depletion  Continue FR 1.8 liters  Check BMP in am if remains admitted  As above if discharged will need to have follow-up with his primary nephrologist at [facility]

## 2025-04-23 NOTE — PLAN OF CARE
Problem: PAIN - ADULT  Goal: Verbalizes/displays adequate comfort level or baseline comfort level  Description: Interventions:- Encourage patient to monitor pain and request assistance- Assess pain using appropriate pain scale- Administer analgesics based on type and severity of pain and evaluate response- Implement non-pharmacological measures as appropriate and evaluate response- Consider cultural and social influences on pain and pain management- Notify physician/advanced practitioner if interventions unsuccessful or patient reports new pain  Interventions:- Encourage patient to monitor pain and request assistance- Assess pain using appropriate pain scale- Administer analgesics based on type and severity of pain and evaluate response- Implement non-pharmacological measures as appropriate and evaluate response- Consider cultural and social influences on pain and pain management- Notify physician/advanced practitioner if interventions unsuccessful or patient reports new pain  Outcome: Progressing     Problem: DISCHARGE PLANNING  Goal: Discharge to home or other facility with appropriate resources  Description: INTERVENTIONS:- Identify barriers to discharge w/patient and caregiver- Arrange for needed discharge resources and transportation as appropriate- Identify discharge learning needs (meds, wound care, etc.)- Arrange for interpretive services to assist at discharge as needed- Refer to Case Management Department for coordinating discharge planning if the patient needs post-hospital services based on physician/advanced practitioner order or complex needs related to functional status, cognitive ability, or social support system  INTERVENTIONS:- Identify barriers to discharge w/patient and caregiver- Arrange for needed discharge resources and transportation as appropriate- Identify discharge learning needs (meds, wound care, etc.)- Arrange for interpretive services to assist at discharge as needed- Refer to Case  Management Department for coordinating discharge planning if the patient needs post-hospital services based on physician/advanced practitioner order or complex needs related to functional status, cognitive ability, or social support system  Outcome: Progressing

## 2025-04-23 NOTE — ASSESSMENT & PLAN NOTE
US right upper quadrant, 4/10/2025: Cirrhosis with severe hepatic steatosis and severe hepatomegaly with sequela of portal hypertension including moderate ascites   As above status post paracentesis for 2820 mL 4/22/2025  Recommend albumin for high-volume paracentesis

## 2025-04-24 ENCOUNTER — TELEPHONE (OUTPATIENT)
Dept: GASTROENTEROLOGY | Facility: CLINIC | Age: 55
End: 2025-04-24

## 2025-04-24 VITALS
WEIGHT: 151.5 LBS | RESPIRATION RATE: 16 BRPM | HEIGHT: 68 IN | TEMPERATURE: 97.5 F | SYSTOLIC BLOOD PRESSURE: 105 MMHG | HEART RATE: 85 BPM | DIASTOLIC BLOOD PRESSURE: 59 MMHG | OXYGEN SATURATION: 97 % | BODY MASS INDEX: 22.96 KG/M2

## 2025-04-24 PROBLEM — N18.9 CHRONIC KIDNEY DISEASE: Status: ACTIVE | Noted: 2025-04-24

## 2025-04-24 LAB
ABO GROUP BLD: NORMAL
ALBUMIN SERPL BCG-MCNC: 2.5 G/DL (ref 3.5–5)
ALBUMIN SERPL ELPH-MCNC: 1.79 G/DL (ref 3.2–5.1)
ALBUMIN SERPL ELPH-MCNC: 27.6 % (ref 48–70)
ALP SERPL-CCNC: 79 U/L (ref 34–104)
ALPHA1 GLOB SERPL ELPH-MCNC: 0.36 G/DL (ref 0.15–0.47)
ALPHA1 GLOB SERPL ELPH-MCNC: 5.6 % (ref 1.8–7)
ALPHA2 GLOB SERPL ELPH-MCNC: 0.42 G/DL (ref 0.42–1.04)
ALPHA2 GLOB SERPL ELPH-MCNC: 6.4 % (ref 5.9–14.9)
ALT SERPL W P-5'-P-CCNC: 22 U/L (ref 7–52)
ANION GAP SERPL CALCULATED.3IONS-SCNC: 6 MMOL/L (ref 4–13)
AST SERPL W P-5'-P-CCNC: 31 U/L (ref 13–39)
BASOPHILS # BLD AUTO: 0.04 THOUSANDS/ÂΜL (ref 0–0.1)
BASOPHILS NFR BLD AUTO: 0 % (ref 0–1)
BETA GLOB ABNORMAL SERPL ELPH-MCNC: 0.27 G/DL (ref 0.31–0.57)
BETA1 GLOB SERPL ELPH-MCNC: 4.2 % (ref 4.7–7.7)
BETA2 GLOB SERPL ELPH-MCNC: 16 % (ref 3.1–7.9)
BETA2+GAMMA GLOB SERPL ELPH-MCNC: 1.04 G/DL (ref 0.2–0.58)
BILIRUB SERPL-MCNC: 3.42 MG/DL (ref 0.2–1)
BLD GP AB SCN SERPL QL: NEGATIVE
BUN SERPL-MCNC: 16 MG/DL (ref 5–25)
CALCIUM ALBUM COR SERPL-MCNC: 11 MG/DL (ref 8.3–10.1)
CALCIUM SERPL-MCNC: 9.8 MG/DL (ref 8.4–10.2)
CHLORIDE SERPL-SCNC: 105 MMOL/L (ref 96–108)
CO2 SERPL-SCNC: 21 MMOL/L (ref 21–32)
CREAT SERPL-MCNC: 2.21 MG/DL (ref 0.6–1.3)
EOSINOPHIL # BLD AUTO: 0.07 THOUSAND/ÂΜL (ref 0–0.61)
EOSINOPHIL NFR BLD AUTO: 1 % (ref 0–6)
ERYTHROCYTE [DISTWIDTH] IN BLOOD BY AUTOMATED COUNT: 18.7 % (ref 11.6–15.1)
GAMMA GLOB ABNORMAL SERPL ELPH-MCNC: 2.61 G/DL (ref 0.4–1.66)
GAMMA GLOB SERPL ELPH-MCNC: 40.2 % (ref 6.9–22.3)
GFR SERPL CREATININE-BSD FRML MDRD: 32 ML/MIN/1.73SQ M
GLUCOSE SERPL-MCNC: 129 MG/DL (ref 65–140)
HCT VFR BLD AUTO: 20.8 % (ref 36.5–49.3)
HCT VFR BLD AUTO: 20.9 % (ref 36.5–49.3)
HCT VFR BLD AUTO: 24.7 % (ref 36.5–49.3)
HFE GENE MUT ANL BLD/T: NORMAL
HGB BLD-MCNC: 7 G/DL (ref 12–17)
HGB BLD-MCNC: 7 G/DL (ref 12–17)
HGB BLD-MCNC: 8.3 G/DL (ref 12–17)
IGG/ALB SER: 0.38 {RATIO} (ref 1.1–1.8)
IMM GRANULOCYTES # BLD AUTO: 0.08 THOUSAND/UL (ref 0–0.2)
IMM GRANULOCYTES NFR BLD AUTO: 1 % (ref 0–2)
LYMPHOCYTES # BLD AUTO: 3.25 THOUSANDS/ÂΜL (ref 0.6–4.47)
LYMPHOCYTES NFR BLD AUTO: 21 % (ref 14–44)
Lab: NORMAL
MCH RBC QN AUTO: 35.2 PG (ref 26.8–34.3)
MCHC RBC AUTO-ENTMCNC: 33.5 G/DL (ref 31.4–37.4)
MCV RBC AUTO: 105 FL (ref 82–98)
MONOCYTES # BLD AUTO: 0.99 THOUSAND/ÂΜL (ref 0.17–1.22)
MONOCYTES NFR BLD AUTO: 6 % (ref 4–12)
NEUTROPHILS # BLD AUTO: 11.1 THOUSANDS/ÂΜL (ref 1.85–7.62)
NEUTS SEG NFR BLD AUTO: 71 % (ref 43–75)
NRBC BLD AUTO-RTO: 0 /100 WBCS
PLATELET # BLD AUTO: 65 THOUSANDS/UL (ref 149–390)
PMV BLD AUTO: 11.8 FL (ref 8.9–12.7)
POTASSIUM SERPL-SCNC: 3.7 MMOL/L (ref 3.5–5.3)
PROT SERPL-MCNC: 6.5 G/DL (ref 6.4–8.4)
PROT SERPL-MCNC: 6.7 G/DL (ref 6.4–8.4)
RBC # BLD AUTO: 1.99 MILLION/UL (ref 3.88–5.62)
RH BLD: POSITIVE
SODIUM SERPL-SCNC: 132 MMOL/L (ref 135–147)
SPECIMEN EXPIRATION DATE: NORMAL
WBC # BLD AUTO: 15.53 THOUSAND/UL (ref 4.31–10.16)

## 2025-04-24 PROCEDURE — 88112 CYTOPATH CELL ENHANCE TECH: CPT | Performed by: STUDENT IN AN ORGANIZED HEALTH CARE EDUCATION/TRAINING PROGRAM

## 2025-04-24 PROCEDURE — 85014 HEMATOCRIT: CPT | Performed by: INTERNAL MEDICINE

## 2025-04-24 PROCEDURE — 86901 BLOOD TYPING SEROLOGIC RH(D): CPT | Performed by: INTERNAL MEDICINE

## 2025-04-24 PROCEDURE — 85018 HEMOGLOBIN: CPT | Performed by: INTERNAL MEDICINE

## 2025-04-24 PROCEDURE — 99239 HOSP IP/OBS DSCHRG MGMT >30: CPT | Performed by: INTERNAL MEDICINE

## 2025-04-24 PROCEDURE — 86900 BLOOD TYPING SEROLOGIC ABO: CPT | Performed by: INTERNAL MEDICINE

## 2025-04-24 PROCEDURE — 80053 COMPREHEN METABOLIC PANEL: CPT | Performed by: INTERNAL MEDICINE

## 2025-04-24 PROCEDURE — 86923 COMPATIBILITY TEST ELECTRIC: CPT

## 2025-04-24 PROCEDURE — P9016 RBC LEUKOCYTES REDUCED: HCPCS

## 2025-04-24 PROCEDURE — 86850 RBC ANTIBODY SCREEN: CPT | Performed by: INTERNAL MEDICINE

## 2025-04-24 PROCEDURE — 99232 SBSQ HOSP IP/OBS MODERATE 35: CPT | Performed by: INTERNAL MEDICINE

## 2025-04-24 PROCEDURE — 85025 COMPLETE CBC W/AUTO DIFF WBC: CPT | Performed by: INTERNAL MEDICINE

## 2025-04-24 PROCEDURE — 88305 TISSUE EXAM BY PATHOLOGIST: CPT | Performed by: STUDENT IN AN ORGANIZED HEALTH CARE EDUCATION/TRAINING PROGRAM

## 2025-04-24 PROCEDURE — 86334 IMMUNOFIX E-PHORESIS SERUM: CPT | Performed by: STUDENT IN AN ORGANIZED HEALTH CARE EDUCATION/TRAINING PROGRAM

## 2025-04-24 PROCEDURE — 84165 PROTEIN E-PHORESIS SERUM: CPT | Performed by: STUDENT IN AN ORGANIZED HEALTH CARE EDUCATION/TRAINING PROGRAM

## 2025-04-24 RX ORDER — MIDODRINE HYDROCHLORIDE 2.5 MG/1
2.5 TABLET ORAL DAILY
Qty: 30 TABLET | Refills: 0 | Status: SHIPPED | OUTPATIENT
Start: 2025-04-25

## 2025-04-24 RX ADMIN — HEPARIN SODIUM 5000 UNITS: 5000 INJECTION INTRAVENOUS; SUBCUTANEOUS at 05:03

## 2025-04-24 RX ADMIN — ESCITALOPRAM OXALATE 10 MG: 10 TABLET ORAL at 08:27

## 2025-04-24 RX ADMIN — MIDODRINE HYDROCHLORIDE 2.5 MG: 5 TABLET ORAL at 05:03

## 2025-04-24 RX ADMIN — PANTOPRAZOLE SODIUM 40 MG: 40 INJECTION, POWDER, FOR SOLUTION INTRAVENOUS at 08:27

## 2025-04-24 NOTE — ASSESSMENT & PLAN NOTE
Current hemoglobin 7.0  Plan for transfusion today primary team  Patient has history of anemia  Management per primary team

## 2025-04-24 NOTE — ASSESSMENT & PLAN NOTE
Lab Results   Component Value Date    EGFR 32 04/24/2025    EGFR 31 04/23/2025    EGFR 29 04/22/2025    CREATININE 2.21 (H) 04/24/2025    CREATININE 2.28 (H) 04/23/2025    CREATININE 2.40 (H) 04/22/2025   Avoid nephrotoxic agents  Follow-up on creatinine  Urology following, appreciate recommendations  Urinary retention protocol  Creatinine is 2.47.  Nephrology follow-up.  Continue to hold spironolactone   (4) rarely moist

## 2025-04-24 NOTE — ASSESSMENT & PLAN NOTE
Patient with history of idiopathic pancreatitis  Patient presented with mild abdominal pain  Patient with elevated T. bili and jaundice on exam  CT abdomen pelvis without contrast 6.5 cm cystic mass in the pancreatic body/tail, indeterminate. Additional 11 mm pancreatic head cyst.   MRCP as below.  Outpatient follow-up with GI

## 2025-04-24 NOTE — ASSESSMENT & PLAN NOTE
Patient was past medical history of transaminitis  Patient presents with generalized weakness and lethargy  Patient follow-up with gastroenterology  Was concern for cirrhosis probably due to [place] due to new onset ascites and abnormal liver function test as patient denied any alcohol intake  Patient was seen recently by gastroenterology and autoimmune, viral serology, ceruloplasmin, iron panel were ordered  Chronic viral serology are nonreactive  Ceruloplasmin is 9.2  Patient recent labs showed worsening kidney function with creatinine of 2.73, AST 61, sodium 128, T. bili 3.77, INR 1.4  Patient was referred to the ED for extensive workup  Patient presented with weakness  GI recommended starting low-dose of spironolactone  Plan  Consult gastroenterology appreciate recommendations  Will hold spironolactone in the setting of BUD  Trend MELD labs  Patient underwent paracentesis with IR and 2820 cc of clear yellow fluid drained  Follow-up culture results  GI recommended no need for liver biopsy as inpatient and they will follow-up as outpatient and determine if liver biopsy is needed  24-hour urine collection for copper to rule out Roque's  Await HFE gene mutation, alpha 1 antitrypsin level  GI follow-up  Nephrology ordered albumin x 2 doses and started on midodrine 2.5 mg daily  Patient blood pressures are improved and stable and patient is cleared by GI and nephrology for discharge with outpatient follow-up

## 2025-04-24 NOTE — ASSESSMENT & PLAN NOTE
Patient has history of anemia  Iron is 43 and ferritin 1365  Probably due to anemia of chronic disease plus iron deficiency  Follow-up on nephrology recommendations  Maintain hemoglobin above 7 and transfuse as needed  Status post 1 PRBC transfusion  Hemoglobin is 7.  Patient will receive 1 more unit of packed red cell transfusion prior to discharge  Hemoglobin is stable at discharge  Cleared by GI for discharge with outpatient follow-up  Repeat CBC with GI in a week

## 2025-04-24 NOTE — ASSESSMENT & PLAN NOTE
Etiology: suspect prior BUD episode requiring RRT in 2018   Baseline creatinine 1.8-2.0 dating back to 2024; has had fluctuating creatinine 3-4.0 though 2023  Patient follows outpatient with [facility] nephrology. Nancy Yeung  Recommend follow up with primary nephrologist as outpatient on discharge

## 2025-04-24 NOTE — ASSESSMENT & PLAN NOTE
Etiology: likely d/t poor oral intake + diuretic use + hypovolemic along with component of SIADH  Admission sodium was 128  Current sodium 132- stable  s/p paracentesis 2.8 liters yesterday  S/p albumin x 2 yesterday  Remains on fluid restriction at 1.8 L   Workup:   Urine Osmo 420-,urine sodium 69-suggestive of SIADH  serum osm-289  TSH 3.388, random cortisol at 5: 45 pm 9.6  Uric acid 4.7  Plan:  Continue holding diuretic for now  Discussed with primary team plan for albumin x 2 doses today with concerns for ongoing volume depletion  Continue FR 1.8 liters  Check BMP in am if remains admitted  As above if discharged will need to have follow-up with his primary nephrologist at [facility]

## 2025-04-24 NOTE — ASSESSMENT & PLAN NOTE
Vit D > 120  Has hypercalcemia as well on admission  Per [facility] Home meds, patient on Cholecalciferol 50,000 weekly.  Hold vit D and calcium supplements for now  Monitor levels   Can take time to improve off treatment

## 2025-04-24 NOTE — TELEPHONE ENCOUNTER
Admitted with cirrhosis and ascites s/p paracentesis negative for SBP.  Also with anemia requiring transfusion.  Patient being discharged today.  Please set up for outpatient hepatology follow-up in 4 to 6 weeks per Dr. Samuel.  If cannot get in with hepatology okay to schedule with Dr. Choudhury who he sees as outpatient.    Thanks,  Edel

## 2025-04-24 NOTE — ASSESSMENT & PLAN NOTE
Patient with past medical history of CKD  Patient follows with [facility] nephrology  Patient current creatinine is 2.21 and baseline was 1.7  Patient was started recently on spironolactone  Plan  Hold spironolactone  Avoid nephrotoxic agent   Nephrology following  Continue midodrine 2.5 mg daily  Cleared by nephrology for discharge with outpatient follow-up

## 2025-04-24 NOTE — ASSESSMENT & PLAN NOTE
Mental status is baseline  Serial BMPs  Hyponatremia work  Cleared by nephrology for discharge with fluid restriction and outpatient follow-up

## 2025-04-24 NOTE — DISCHARGE INSTR - AVS FIRST PAGE
Follow-up with PCP as outpatient  Follow-up with nephrology as outpatient  Follow-up with GI and hepatology as outpatient

## 2025-04-24 NOTE — ASSESSMENT & PLAN NOTE
Lab Results   Component Value Date    EGFR 32 04/24/2025    EGFR 31 04/23/2025    EGFR 29 04/22/2025    CREATININE 2.21 (H) 04/24/2025    CREATININE 2.28 (H) 04/23/2025    CREATININE 2.40 (H) 04/22/2025

## 2025-04-24 NOTE — PLAN OF CARE
Problem: DISCHARGE PLANNING  Goal: Discharge to home or other facility with appropriate resources  Description: INTERVENTIONS:- Identify barriers to discharge w/patient and caregiver- Arrange for needed discharge resources and transportation as appropriate- Identify discharge learning needs (meds, wound care, etc.)- Arrange for interpretive services to assist at discharge as needed- Refer to Case Management Department for coordinating discharge planning if the patient needs post-hospital services based on physician/advanced practitioner order or complex needs related to functional status, cognitive ability, or social support system  INTERVENTIONS:- Identify barriers to discharge w/patient and caregiver- Arrange for needed discharge resources and transportation as appropriate- Identify discharge learning needs (meds, wound care, etc.)- Arrange for interpretive services to assist at discharge as needed- Refer to Case Management Department for coordinating discharge planning if the patient needs post-hospital services based on physician/advanced practitioner order or complex needs related to functional status, cognitive ability, or social support system  Outcome: Progressing     Problem: HEMATOLOGIC - ADULT  Goal: Maintains hematologic stability  Description: INTERVENTIONS- Assess for signs and symptoms of bleeding or hemorrhage- Monitor labs- Administer supportive blood products/factors as ordered and appropriate  Outcome: Progressing     Problem: PAIN - ADULT  Goal: Verbalizes/displays adequate comfort level or baseline comfort level  Description: Interventions:- Encourage patient to monitor pain and request assistance- Assess pain using appropriate pain scale- Administer analgesics based on type and severity of pain and evaluate response- Implement non-pharmacological measures as appropriate and evaluate response- Consider cultural and social influences on pain and pain management- Notify physician/advanced  practitioner if interventions unsuccessful or patient reports new pain  Interventions:- Encourage patient to monitor pain and request assistance- Assess pain using appropriate pain scale- Administer analgesics based on type and severity of pain and evaluate response- Implement non-pharmacological measures as appropriate and evaluate response- Consider cultural and social influences on pain and pain management- Notify physician/advanced practitioner if interventions unsuccessful or patient reports new pain  Outcome: Progressing

## 2025-04-24 NOTE — ASSESSMENT & PLAN NOTE
Etiology: multifactorial:  Prerenal given  poor oral intake + hypovolemia/dehydration with decreased effective volume history of cirrhosis, leading to ATN  Admission creatinine 2.73  Baseline creatinine 1.8-2.0 since 2024.  Prior to that 3.0-4.0 since 2023  S/p albumin x 2 yesterday  Creatinine 2.21 < 2.28   Not on ace/arb outpateint  Spironolactone currently on hold  BP 90's with addition of low dose midodrine  Status post paracentesis for 2820 ml 3/22/2025  Workup:  UA: Trace blood and leukocytes, +1 protein, moderate epithelial cells, 20-30 hyaline casts, 3-5 granular casts, innumerable bacteria and mucus threads  Urine electrolytes: Urine chloride 88, urine creatinine 129, urine sodium 69, urine urea 367   FENa: 1.0% -indeterminate.  Can be seen with either prerenal or intrinsic BUD  FEUrea: 43.9% - >35% suggestive of intrinsic A  PCXR: Negative  4/19/2025 CT A/P wo: 5 mm nonobstructing calculus left mid kidney. 4 mm calculus left UPJ, without obstruction.   Plan:  Avoid all NSAIDs, nephrotoxic agents, as well as IV contrast  Avoid hypotension and any perturbations in blood pressure  Closely monitor I&O, daily weight and labs  Continue with low dose midodrine per primary team  Plan for 1 unit PRBC  Tentative discharge today   Recommend ongoing follow-up his primary nephrologist at [facility]

## 2025-04-24 NOTE — ASSESSMENT & PLAN NOTE
History of idiopathic pancreatitis  Presented with mild abdominal pain, currently denies pain  CT A/P wo:  6.5 cm cystic mass in the pancreatic body/tail

## 2025-04-24 NOTE — ASSESSMENT & PLAN NOTE
Newly diagnosed. There is evidence of cirrhosis based on labs, imaging, and physical exam. Bilirubin 2.96. Albumin 1.9. INR 1.57. Platelets 64,000. RUQ US 4/10/2025 shows cirrhosis with severe steatosis, hepatomegaly, sequela of portal HTN including moderate ascites. CT A/P 4/19 again shows evidence of cirrhosis, moderate to large ascites. No significant alcohol use in the past. Extensive liver serologic workup significant for low ceruloplasmin suspicious for Roque's disease and elevated ferritin 1365, concern for hemochromatosis. Autoimmune and viral serologies negative. MELD 3.0 = 27 on 4/24 4/24: denies abd distention/confusion, normal BM, Hg 7.0, pt to receive 1u PRBC and D/c later today per primary team  4/23: S/P 2.8L para 4/22 neg for SBP, hypotensive overnight BP 87/44, started on midodrine 2.5 qd and IV albumin x2 per nephrology  4/22: for paracentesis today, MRI with pancreatic cysts up to 5.7 cm, PDD, rec EUS as outpt    - Monitor MELD labs daily  - await HFE gene mutation, alpha 1 antitrypsin level normal  -await 24 hr urine copper r/o Roque's  -Dr. Mosqueda reviewed with Dr. Mota 4/21.  Patient with history of heavy alcohol and prior admissions for this, pt denies.  Reports was drinking socially up until 6 months ago  - Recommend close outpatient hepatology follow-up    Ascites:  -4/22 2.8 L para, neg for SBP, low protein, albumin < 1.5, cytology pending  -Recommend low sodium diet  -Hold spironolactone due to BUD    HE:  No overt s/sx at this time, monitor mental status    Screening for esophageal varices:  No evidence of varices on EGD from December 2024  - He will eventually need repeat EGD due to new hepatic decompensation    HCC screening  No evidence of hepatoma on RUQ ultrasound 4/10

## 2025-04-24 NOTE — PROGRESS NOTES
Progress Note - Nephrology   Name: Sudhir Bach 55 y.o. male I MRN: [id number]  Unit/Bed#: -01 I Date of Admission: 4/19/2025   Date of Service: 4/24/2025 I Hospital Day: 5     Assessment & Plan  BUD (acute kidney injury) (HCC)  Etiology: multifactorial:  Prerenal given  poor oral intake + hypovolemia/dehydration with decreased effective volume history of cirrhosis, leading to ATN  Admission creatinine 2.73  Baseline creatinine 1.8-2.0 since 2024.  Prior to that 3.0-4.0 since 2023  S/p albumin x 2 yesterday  Creatinine 2.21 < 2.28   Not on ace/arb outpateint  Spironolactone currently on hold  BP 90's with addition of low dose midodrine  Status post paracentesis for 2820 ml 3/22/2025  Workup:  UA: Trace blood and leukocytes, +1 protein, moderate epithelial cells, 20-30 hyaline casts, 3-5 granular casts, innumerable bacteria and mucus threads  Urine electrolytes: Urine chloride 88, urine creatinine 129, urine sodium 69, urine urea 367   FENa: 1.0% -indeterminate.  Can be seen with either prerenal or intrinsic BUD  FEUrea: 43.9% - >35% suggestive of intrinsic A  PCXR: Negative  4/19/2025 CT A/P wo: 5 mm nonobstructing calculus left mid kidney. 4 mm calculus left UPJ, without obstruction.   Plan:  Avoid all NSAIDs, nephrotoxic agents, as well as IV contrast  Avoid hypotension and any perturbations in blood pressure  Closely monitor I&O, daily weight and labs  Continue with low dose midodrine per primary team  Plan for 1 unit PRBC  Tentative discharge today   Recommend ongoing follow-up his primary nephrologist at [facility]  CKD (chronic kidney disease) stage 4, GFR 15-29 ml/min (HCC)  Etiology: suspect prior BUD episode requiring RRT in 2018   Baseline creatinine 1.8-2.0 dating back to 2024; has had fluctuating creatinine 3-4.0 though 2023  Patient follows outpatient with [facility] nephrology. Nancy Yeung  Recommend follow up with primary nephrologist as  outpatient on discharge  Hyponatremia  Etiology: likely d/t poor oral intake + diuretic use + hypovolemic along with component of SIADH  Admission sodium was 128  Current sodium 132- stable  s/p paracentesis 2.8 liters yesterday  S/p albumin x 2 yesterday  Remains on fluid restriction at 1.8 L   Workup:   Urine Osmo 420-,urine sodium 69-suggestive of SIADH  serum osm-289  TSH 3.388, random cortisol at 5: 45 pm 9.6  Uric acid 4.7  Plan:  Continue holding diuretic for now  Discussed with primary team plan for albumin x 2 doses today with concerns for ongoing volume depletion  Continue FR 1.8 liters  Check BMP in am if remains admitted  As above if discharged will need to have follow-up with his primary nephrologist at [facility]  Anemia  Current hemoglobin 7.0  Plan for transfusion today primary team  Patient has history of anemia  Management per primary team  Hypercalcemia  Admission corrected calcium 11.2   Likely in the setting of elevated Vit D and taking supplements along with volume depletion  Most recent corrected calcium 11.0  Hold  further vitamin D and calcium supplements  Workup in progress-can be followed up with his primary nephrologist  Immunofixation, SPEP, UPEP, PTH RP-pending  Immunoglobulin free light chains reveal kappa lambda ratio 1.21  PTH-I-5.7  Vitamin D deficiency  Vit D > 120  Has hypercalcemia as well on admission  Per [facility] med[facility], patient on Cholecalciferol 50,000 weekly.  Hold vit D and calcium supplements for now  Monitor levels   Can take time to improve off treatment  Other cirrhosis of liver (HCC)  US right upper quadrant, 4/10/2025: Cirrhosis with severe hepatic steatosis and severe hepatomegaly with sequela of portal hypertension including moderate ascites   As above status post paracentesis for 2820 mL 4/22/2025  Recommend albumin for high-volume paracentesis  Pancreatic cyst  History of idiopathic pancreatitis  Presented with mild abdominal pain, currently denies pain  CT A/P wo:   6.5 cm cystic mass in the pancreatic body/tail   Idiopathic pancreatitis  Gastroenterology following  Low bicarbonate  Overall improved and resolved and currently 21  Likely in the setting of GI and liver disease    Overall plan and recommendations has been reviewed with primary team and I agree with the plan as stated below  Continue with low-dose midodrine per primary team  Agree with transfusion prior to discharge  Function remains stable and okay for discharge from nephrology standpoint as long as medically stable per primary team  Patient to follow-up with his primary nephrologist in [place]-patient agreed and aware  No further recommendations at this time from nephrology standpoint    Review of Systems  Patient seen and examined at bedside  Review of Systems   Constitutional: Negative.  Negative for activity change, appetite change, chills, diaphoresis, fatigue and fever.   HENT: Negative.  Negative for congestion and facial swelling.    Respiratory: Negative.     Cardiovascular: Negative.    Gastrointestinal: Negative.    Endocrine: Negative.    Genitourinary: Negative.    Musculoskeletal: Negative.    Skin: Negative.    Allergic/Immunologic: Negative.    Neurological: Negative.    Hematological: Negative.    Psychiatric/Behavioral: Negative.           Physical Exam:  Current Weight: Weight - Scale: 68.7 kg (151 lb 8 oz)  Vitals:    04/23/25 1912 04/23/25 1914 04/24/25 0502 04/24/25 0713   BP: 100/56  98/55 98/54   BP Location:       Pulse: 82  81 79   Resp: 18   16   Temp:  (!) 97 °F (36.1 °C) 97.5 °F (36.4 °C) 97.7 °F (36.5 °C)   TempSrc:  Oral     SpO2: 95%  97% 97%   Weight:       Height:           Physical Exam  Vitals and nursing note reviewed.   Constitutional:       Appearance: Normal appearance.   HENT:      Head: Normocephalic and atraumatic.      Nose: Nose normal.      Mouth/Throat:      Mouth: Mucous membranes are moist.      Pharynx: Oropharynx is clear.   Eyes:      Extraocular Movements:  Extraocular movements intact.      Conjunctiva/sclera: Conjunctivae normal.   Cardiovascular:      Rate and Rhythm: Normal rate and regular rhythm.      Pulses: Normal pulses.      Heart sounds: Normal heart sounds.   Pulmonary:      Effort: Pulmonary effort is normal.      Breath sounds: Normal breath sounds.   Abdominal:      General: Bowel sounds are normal.      Palpations: Abdomen is soft.   Musculoskeletal:         General: Normal range of motion.      Cervical back: Normal range of motion and neck supple.   Skin:     General: Skin is warm and dry.   Neurological:      General: No focal deficit present.      Mental Status: He is alert and oriented to person, place, and time.   Psychiatric:         Mood and Affect: Mood normal.         Behavior: Behavior normal.            Medications:    Current Facility-Administered Medications:     escitalopram (LEXAPRO) tablet 10 mg, 10 mg, Oral, Daily, Sudhir Mcdonald DO, 10 mg at 04/24/25 0827    heparin (porcine) subcutaneous injection 5,000 Units, 5,000 Units, Subcutaneous, Q8H [facility], Sudhir Mcdonald DO, 5,000 Units at 04/24/25 0503    midodrine (PROAMATINE) tablet 2.5 mg, 2.5 mg, Oral, Daily, Sofia Clifton PA-C, 2.5 mg at 04/24/25 0503    multi-electrolyte (Plasmalyte-A/Isolyte-S PH 7.4/Normosol-R) IV bolus 1,000 mL, 1,000 mL, Intravenous, Once, Sudhir Mcdonald DO, Last Rate: 0 mL/hr at 04/19/25 1833, Restarted at 04/19/25 2359    pantoprazole (PROTONIX) injection 40 mg, 40 mg, Intravenous, Q12H [facility], 40 mg at 04/24/25 0827    [Held by provider] spironolactone (ALDACTONE) tablet 50 mg, 50 mg, Oral, Daily, Sudhir Mcdonald DO    Laboratory Results:  Results from last 7 days   Lab Units 04/24/25  0747 04/24/25  0514 04/23/25  0841 04/22/25  1957 04/22/25  1228 04/21/25 2015 04/21/25  0815 04/20/25  1745 04/20/25  0527 04/19/25  1038   WBC Thousand/uL  --  15.53* 16.59*  --  15.24*  --  15.35*  --  17.75* 22.88*   HEMOGLOBIN g/dL 7.0* 7.0* 7.6* 7.3* 7.7*  "7.6* 7.9*   < > 6.8* 7.7*   HEMATOCRIT % 20.8* 20.9* 23.3*  --  23.4*  --  23.4*  --  20.3* 23.6*   PLATELETS Thousands/uL  --  65* 68*  --  64*  --  63*  --  64* 88*   SODIUM mmol/L  --  132* 132*  --  130*  --  130*  --  131* 128*   POTASSIUM mmol/L  --  3.7 4.1  --  3.9  --  3.6  --  4.0 4.3   CHLORIDE mmol/L  --  105 105  --  105  --  104  --  105 103   CO2 mmol/L  --  21 22  --  21  --  19*  --  19* 17*   BUN mg/dL  --  16 16  --  15  --  15  --  16 16   CREATININE mg/dL  --  2.21* 2.28*  --  2.40*  --  2.30*  --  2.47* 2.73*   CALCIUM mg/dL  --  9.8 9.7  --  9.5  --  9.3  --  9.4 9.8   MAGNESIUM mg/dL  --   --  1.9  --   --   --   --   --  2.0 1.9   PHOSPHORUS mg/dL  --   --   --   --   --   --   --   --  2.9  --     < > = values in this interval not displayed.       Medical records have been reviewed through [facility] and care everywhere for this patient encounter    Portions of the record may have been created with voice recognition software. Occasional wrong word or \"sound a like\" substitutions may have occurred due to the inherent limitations of voice recognition software. Read the chart carefully and recognize,   "

## 2025-04-24 NOTE — ASSESSMENT & PLAN NOTE
"Patient with past medical history of idiopathic pancreatitis    CT abdomen/pelvis-  6.5 cm cystic mass in the pancreatic body/tail, indeterminate. Additional 11 mm pancreatic head cyst.     Lipase-normal  MRI/MRCP abdomen-\"No evidence of choledocholithiasis.Cholelithiasis with pericholecystic fluid,   5.7 x 5.2 cm cyst in relation to the distal body/tail of the pancreas communicating with the dorsal pancreatic duct, suggest pancreatic pseudocyst in the context of patient having a previous bout of pancreatitis, other etiologies could include IPMN.Additional 2.2 cm cyst in the head of the pancreas with thick walls, indeterminate may be sequela of prior pancreatitis.The pancreatic lesions can be assessed with the EUS, These are  not described on the previous study performed at outside facility in 2023.Hepatic steatosis\"  Outpatient follow-up with GI  "

## 2025-04-24 NOTE — ASSESSMENT & PLAN NOTE
BUD on CKD,Cr  2.73> 2.2 compared to baseline 1.8-2.0. Patient recently initiated on spironolactone.    - Agree with holding spironolactone  - Appreciate nephrology recommendations  -monitor and correct Cr and electrolytes

## 2025-04-24 NOTE — PROGRESS NOTES
Progress Note - Gastroenterology   Name: Sudhir Bach 55 y.o. male I MRN: [id number]  Unit/Bed#: -01 I Date of Admission: 4/19/2025   Date of Service: 4/24/2025 I Hospital Day: 5    Assessment & Plan  Other cirrhosis of liver (HCC)  Newly diagnosed. There is evidence of cirrhosis based on labs, imaging, and physical exam. Bilirubin 2.96. Albumin 1.9. INR 1.57. Platelets 64,000. RUQ US 4/10/2025 shows cirrhosis with severe steatosis, hepatomegaly, sequela of portal HTN including moderate ascites. CT A/P 4/19 again shows evidence of cirrhosis, moderate to large ascites. No significant alcohol use in the past. Extensive liver serologic workup significant for low ceruloplasmin suspicious for Roque's disease and elevated ferritin 1365, concern for hemochromatosis. Autoimmune and viral serologies negative. MELD 3.0 = 27 on 4/24 4/24: denies abd distention/confusion, normal BM, Hg 7.0, pt to receive 1u PRBC and D/c later today per primary team  4/23: S/P 2.8L para 4/22 neg for SBP, hypotensive overnight BP 87/44, started on midodrine 2.5 qd and IV albumin x2 per nephrology  4/22: for paracentesis today, MRI with pancreatic cysts up to 5.7 cm, PDD, rec EUS as outpt    - Monitor MELD labs daily  - await HFE gene mutation, alpha 1 antitrypsin level normal  -await 24 hr urine copper r/o Roque's  -Dr. Mosqueda reviewed with Dr. Mota 4/21.  Patient with history of heavy alcohol and prior admissions for this, pt denies.  Reports was drinking socially up until 6 months ago  - Recommend close outpatient hepatology follow-up    Ascites:  -4/22 2.8 L para, neg for SBP, low protein, albumin < 1.5, cytology pending  -Recommend low sodium diet  -Hold spironolactone due to BUD    HE:  No overt s/sx at this time, monitor mental status    Screening for esophageal varices:  No evidence of varices on EGD from December 2024  - He will eventually need repeat EGD due to new hepatic decompensation    HCC screening  No evidence of  hepatoma on RUQ ultrasound 4/10  BUD (acute kidney injury) (HCC)  BUD on CKD,Cr  2.73> 2.2 compared to baseline 1.8-2.0. Patient recently initiated on spironolactone.    - Agree with holding spironolactone  - Appreciate nephrology recommendations  -monitor and correct Cr and electrolytes  Anemia  Hemoglobin 7.7 on admission, down from 12.5 in September 2023. MCV high 114. B12 and folate WNL. Iron 43 and ferritin 1365. Recent EGD and colonoscopy from December 2024 significant for gastritis and mild Schatzki's ring, colon polyps, diverticulosis, hemorrhoids. No current signs of overt GI bleeding.    - Monitor H&H and transfuse to keep hemoglobin greater than 7 (received 1 u 4/20 for Hg 6.8), Hg stable 7.0, for 1 u today prior to D/c per primary team  - Monitor for GI bleeding  - Patient will need repeat EGD to screen for esophageal varices given new decompensation  Pancreatic cyst  Patient with history of idiopathic pancreatitis. No evidence of pancreatitis at present time. CT shows apparent cystic mass in the pancreatic body/tail measuring approximately 6.5 x 4.3 cm and another 11 mm cyst along the medial pancreatic head. Suspect cysts may be pseudocysts related to prior episode of pancreatitis.    - MRI ABD:  5.7 and 2.2 cm panc cysts, possible pseudocysts  vs indeterminate, PD dilated and communicates with cyst, rec EUS as outpt        Subjective   Patient seen and examined.  Tolerating diet.  Denies abdominal distention/confusion.  Having normal bowel movements without bleeding.  Hemoglobin 7.0 being transfused 1 unit, plan for D/C later today    Objective :  Temp:  [97 °F (36.1 °C)-98.6 °F (37 °C)] 97.9 °F (36.6 °C)  HR:  [79-87] 81  BP: ()/(54-56) 99/54  Resp:  [16-18] 16  SpO2:  [95 %-97 %] 95 %  O2 Device: None (Room air)    Physical Exam  Constitutional: Well-developed, no acute distress  HEENT: normocephalic, mucous membranes moist.+ scleral icterus  Neck: Supple  Skin: warm and dry  Respiratory: Lungs  are clear to auscultation B/L.  Cardiovascular: Heart is regular rate and rhythm.  Gastrointestinal: Soft, nontender, nondistended with normal active bowel sounds.  No masses, guarding, rebound.   Rectal Exam: Deferred.  Extremities: No edema.  Neurologic: Nonfocal. A & O ×3. No asterixis  Psychiatric: Normal affect.      Lab Results: I have reviewed the following results:CBC/BMP:   .     04/24/25  0514 04/24/25  0747   WBC 15.53*  --    HGB 7.0* 7.0*   HCT 20.9* 20.8*   PLT 65*  --    SODIUM 132*  --    K 3.7  --      --    CO2 21  --    BUN 16  --    CREATININE 2.21*  --    GLUC 129  --     , Creatinine Clearance: Estimated Creatinine Clearance: 36.5 mL/min (A) (by C-G formula based on SCr of 2.21 mg/dL (H))., LFTs:   .     04/24/25  0514   AST 31   ALT 22   ALB 2.5*   TBILI 3.42*   ALKPHOS 79    , PTT/INR:No new results in last 24 hours.

## 2025-04-24 NOTE — NURSING NOTE
Patient was given AVS and it was gone over. Patient is aware that he needs to get his medications at the pharmacy on the way home and aware of followup visits . IV was removed.  Patient was taken to the front but a PCA and drove himself home .

## 2025-04-24 NOTE — ASSESSMENT & PLAN NOTE
Admission corrected calcium 11.2   Likely in the setting of elevated Vit D and taking supplements along with volume depletion  Most recent corrected calcium 11.0  Hold  further vitamin D and calcium supplements  Workup in progress-can be followed up with his primary nephrologist  Immunofixation, SPEP, UPEP, PTH RP-pending  Immunoglobulin free light chains reveal kappa lambda ratio 1.21  PTH-I-5.7

## 2025-04-24 NOTE — ASSESSMENT & PLAN NOTE
Hemoglobin 7.7 on admission, down from 12.5 in September 2023. MCV high 114. B12 and folate WNL. Iron 43 and ferritin 1365. Recent EGD and colonoscopy from December 2024 significant for gastritis and mild Schatzki's ring, colon polyps, diverticulosis, hemorrhoids. No current signs of overt GI bleeding.    - Monitor H&H and transfuse to keep hemoglobin greater than 7 (received 1 u 4/20 for Hg 6.8), Hg stable 7.0, for 1 u today prior to D/c per primary team  - Monitor for GI bleeding  - Patient will need repeat EGD to screen for esophageal varices given new decompensation

## 2025-04-24 NOTE — DISCHARGE SUMMARY
Discharge Summary - Hospitalist   Name: Sudhir Bach 55 y.o. male I MRN: [id number]  Unit/Bed#: -01 I Date of Admission: 4/19/2025   Date of Service: 4/24/2025 I Hospital Day: 5     Assessment & Plan  Other cirrhosis of liver (HCC)  Patient was past medical history of transaminitis  Patient presents with generalized weakness and lethargy  Patient follow-up with gastroenterology  Was concern for cirrhosis probably due to [place] due to new onset ascites and abnormal liver function test as patient denied any alcohol intake  Patient was seen recently by gastroenterology and autoimmune, viral serology, ceruloplasmin, iron panel were ordered  Chronic viral serology are nonreactive  Ceruloplasmin is 9.2  Patient recent labs showed worsening kidney function with creatinine of 2.73, AST 61, sodium 128, T. bili 3.77, INR 1.4  Patient was referred to the ED for extensive workup  Patient presented with weakness  GI recommended starting low-dose of spironolactone  Plan  Consult gastroenterology appreciate recommendations  Will hold spironolactone in the setting of BUD  Trend MELD labs  Patient underwent paracentesis with IR and 2820 cc of clear yellow fluid drained  Follow-up culture results  GI recommended no need for liver biopsy as inpatient and they will follow-up as outpatient and determine if liver biopsy is needed  24-hour urine collection for copper to rule out Roque's  Await HFE gene mutation, alpha 1 antitrypsin level  GI follow-up  Nephrology ordered albumin x 2 doses and started on midodrine 2.5 mg daily  Patient blood pressures are improved and stable and patient is cleared by GI and nephrology for discharge with outpatient follow-up  BUD (acute kidney injury) (HCC)  Patient with past medical history of CKD  Patient follows with [facility] nephrology  Patient current creatinine is 2.21 and baseline was 1.7  Patient was started recently on spironolactone  Plan  Hold spironolactone  Avoid nephrotoxic agent    Nephrology following  Continue midodrine 2.5 mg daily  Cleared by nephrology for discharge with outpatient follow-up  Pancreatic cyst  Patient with history of idiopathic pancreatitis  Patient presented with mild abdominal pain  Patient with elevated T. bili and jaundice on exam  CT abdomen pelvis without contrast 6.5 cm cystic mass in the pancreatic body/tail, indeterminate. Additional 11 mm pancreatic head cyst.   MRCP as below.  Outpatient follow-up with GI  SIRS (systemic inflammatory response syndrome) ([facility])  Patient met SIRS criteria through leukocytosis and tachypnea  She denied any fever, burning urination  UA showed 2-4 white cell count  No signs of infection  Will continue to monitor off antibiotic  Blood cultures are negative to date  Weight loss  Patient presented with weight loss  Patient reported loss of 19 pounds in the last 2 weeks  Patient also complained of weakness, lethargy  Patient had recent EGD and colonoscopy were unremarkable  In the setting of elevated liver enzymes and BUD    Mri abdomen pending  History of CVA (cerebrovascular accident)  Patient with past medical history of stroke with no residual deficits  Currently on statin  Will hold statin in the setting of elevated liver enzymes  Mixed hyperlipidemia  On statin  Will hold statin due to elevated liver enzymes  Anemia  Patient has history of anemia  Iron is 43 and ferritin 1365  Probably due to anemia of chronic disease plus iron deficiency  Follow-up on nephrology recommendations  Maintain hemoglobin above 7 and transfuse as needed  Status post 1 PRBC transfusion  Hemoglobin is 7.  Patient will receive 1 more unit of packed red cell transfusion prior to discharge  Hemoglobin is stable at discharge  Cleared by GI for discharge with outpatient follow-up  Repeat CBC with GI in a week  CKD (chronic kidney disease) stage 4, GFR 15-29 ml/min ([facility])  Lab Results   Component Value Date    EGFR 32 04/24/2025    EGFR 31 04/23/2025    EGFR 29  "04/22/2025    CREATININE 2.21 (H) 04/24/2025    CREATININE 2.28 (H) 04/23/2025    CREATININE 2.40 (H) 04/22/2025   Avoid nephrotoxic agents  Follow-up on creatinine  Urology following, appreciate recommendations  Urinary retention protocol  Creatinine is 2.47.  Nephrology follow-up.  Continue to hold spironolactone  Vitamin D deficiency  On vitamin D once weekly    Idiopathic pancreatitis  Patient with past medical history of idiopathic pancreatitis    CT abdomen/pelvis-  6.5 cm cystic mass in the pancreatic body/tail, indeterminate. Additional 11 mm pancreatic head cyst.     Lipase-normal  MRI/MRCP abdomen-\"No evidence of choledocholithiasis.Cholelithiasis with pericholecystic fluid,   5.7 x 5.2 cm cyst in relation to the distal body/tail of the pancreas communicating with the dorsal pancreatic duct, suggest pancreatic pseudocyst in the context of patient having a previous bout of pancreatitis, other etiologies could include IPMN.Additional 2.2 cm cyst in the head of the pancreas with thick walls, indeterminate may be sequela of prior pancreatitis.The pancreatic lesions can be assessed with the EUS, These are  not described on the previous study performed at outside facility in 2023.Hepatic steatosis\"  Outpatient follow-up with GI  Hyponatremia  Mental status is baseline  Serial BMPs  Hyponatremia work  Cleared by nephrology for discharge with fluid restriction and outpatient follow-up  Hypercalcemia    Low bicarbonate    Chronic renal failure  Lab Results   Component Value Date    EGFR 32 04/24/2025    EGFR 31 04/23/2025    EGFR 29 04/22/2025    CREATININE 2.21 (H) 04/24/2025    CREATININE 2.28 (H) 04/23/2025    CREATININE 2.40 (H) 04/22/2025     Chronic renal impairment  Lab Results   Component Value Date    EGFR 32 04/24/2025    EGFR 31 04/23/2025    EGFR 29 04/22/2025    CREATININE 2.21 (H) 04/24/2025    CREATININE 2.28 (H) 04/23/2025    CREATININE 2.40 (H) 04/22/2025      Hospital Course:     Sudhir Bach is " a 55 y.o. male patient who originally presented to the hospital on   Admission Orders (From admission, onward)       Ordered        04/19/25 1554  INPATIENT ADMISSION  Once                         due to generalized weakness.  Patient was admitted with acute kidney injury, transaminitis and was found to have pancreatic cyst.  Patient was seen by GI and nephrology.  Patient was started on IV fluids.  Spironolactone was held.  Patient was found to have new diagnosis of cirrhosis of liver.  Patient underwent paracentesis which was negative for SBP.  Patient had 24-hour urine copper collection as per GI recommendations.  MRCP results as above.  Patient creatinine at discharge is 2.21 and blood pressures are stable.  Patient is cleared by nephrology for discharge and they recommended continue fluid restriction and hold spironolactone and follow-up with outpatient nephrology.  Patient is also cleared from GI perspective with outpatient follow-up with them and hepatologist.  Patient is hemodynamically stable and is in agreement with the discharge plan.  Patient is encouraged to follow-up with GI/hepatologist and nephrology  On Exam-  Chest-bilateral air entry, clear to auscultation  Abdomen-soft, nontender  Heart-S1 S2 regular  Extremities-no pedal edema or calf tenderness  Neuro-alert awake oriented x3.  No focal deficits    Please see above list of diagnoses and related plan for additional information.   Follow-up with PCP as outpatient  Follow-up with nephrology as outpatient  Follow-up with GI and hepatology as outpatient    CONSULTING PROVIDERS   IP CONSULT TO GASTROENTEROLOGY  IP CONSULT TO NEPHROLOGY  IP CONSULT TO CASE MANAGEMENT    PROCEDURES PERFORMED  * No surgery found *    RADIOLOGY RESULTS  IR INPATIENT Paracentesis  Result Date: 4/22/2025  Narrative: Ultrasound-guided paracentesis Clinical History: Ascites. Technique: Patient was brought to the interventional radiology area and placed supine on the  stretcher. After a brief ultrasound examination was performed to localize a pocket of fluid,an area on the skin of the right abdominal wall was prepped, and draped in the usual sterile fashion. Lidocaine was administered to the skin and a small skin incision was made. A 5 [employer] multisidehole catheter  was advanced into the fluid and 2820 mL of clear yellow fluid was aspirated. Specimens were sent . After the procedure, the catheter was removed and a bandage applied to the site. The patient tolerated the procedure well and suffered no complications.     Impression: Impression: 1. Successful ultrasound-guided paracentesis yielding 2820 ml of clear yellow fluid. Workstation performed: [id number]     MRI abdomen w wo contrast and mrcp  Result Date: 4/21/2025  Narrative: MRI OF THE ABDOMEN WITH AND WITHOUT CONTRAST WITH MRCP INDICATION: 55 years / Male. mrcp for stones. COMPARISON: CT from April 19, 2025 TECHNIQUE: Multiplanar/multisequence MRI of the abdomen with 3D MRCP was performed before and after administration of contrast. IV Contrast: 6 mL of Gadobutrol injection FINDINGS: LOWER CHEST: Unremarkable. LIVER: Hepatic steatosis seen No suspicious mass. No area of diffusion restriction seen Patent hepatic and portal veins. Recanalized umbilical vein suggesting portal hypertension multiple prominent vessels seen near the GE junction likely representing collaterals BILE DUCTS: No evidence of choledocholithiasis Common bile duct measures about 5 mm and no dilatation of the intrahepatic ducts seen in the distal portion the common bile duct is not well seen. Pancreatic duct is dilated on the T2-weighted sequences and postcontrast images the portion of the pancreatic duct in the head and neck is not well seen GALLBLADDER: Cholelithiasis seen with gallstones and pericholecystic fluid seen PANCREAS: Large cyst seen in relation to the distal body/tail of the pancreas, measuring 5.7 x 5.2 cm. This extends to involve the  left anterior pararenal fascia , this communicates with the dorsal pancreatic duct not associated with any internal nodularity. Mild thin septation seen, seen in image 87 of series 13 Additional cyst in the region of the head of the pancreas seen, measuring 2.2 cm this demonstrates thick walls, seen in image 100 series 13 Evaluation of the pancreatic duct is limited on the MRCP sequences ADRENAL GLANDS: Unremarkable. SPLEEN: Normal. KIDNEYS/PROXIMAL URETERS: No hydroureteronephrosis. No suspicious renal mass. Renal cysts are seen BOWEL: No dilated loops of bowel. Mild thickening of the colon seen PERITONEUM/RETROPERITONEUM: Ascites seen with fluid in the perihepatic region, paracolic gutters and edema in the omentum seen. LYMPH NODES: No abdominal lymphadenopathy. VESSELS: No prominent vessels seen around the GE junction and along the lesser curvature of the stomach suggesting varices, seen in image 415 of series [id number] and image 391 ABDOMINAL WALL: Unremarkable. BONES: No suspicious osseous lesion.     Impression: No evidence of choledocholithiasis Cholelithiasis with pericholecystic fluid, 5.7 x 5.2 cm cyst in relation to the distal body/tail of the pancreas communicating with the dorsal pancreatic duct, suggest pancreatic pseudocyst in the context of patient having a previous bout of pancreatitis, other etiologies could include IPMN Additional 2.2 cm cyst in the head of the pancreas with thick walls, indeterminate may be sequela of prior pancreatitis. The pancreatic lesions can be assessed with the EUS, These are  not described on the previous study performed at outside facility in 2023 Hepatic steatosis Portal hypertension with ascites Mild thickening of the colon, nonspecific may be due to colitis or related to hepatic cirrhosis Suboptimal quality of the MRCP Pancreatic duct is dilated in the region of the body and tail of the pancreas and communicates with a large cystic lesion/pancreatic pseudocyst however the  pancreatic duct in the head and neck of the pancreas is not well seen, this duct this may be due to nondistended pancreatic duct or due to stricture . No MRI evidence of HCC. Workstation performed: [id number]     CT abdomen pelvis wo contrast  Addendum Date: 4/19/2025  Addendum: ADDENDUM: Findings were communicated with Dr. CYNDI MARAVILLA on 4/19/2025 at 3:01 PM via HIPPA compliant electronic text messaging. Apparently the initial report was not visible in PACS.    Result Date: 4/19/2025  Narrative: CT ABDOMEN AND PELVIS WITHOUT IV CONTRAST INDICATION: Weakness, cirrhosis, anemia, ckd. COMPARISON: Right upper quadrant ultrasound 4/10/2025 TECHNIQUE: CT examination of the abdomen and pelvis was performed without intravenous contrast. Multiplanar 2D reformatted images were created from the source data. This examination, like all CT scans performed in the [facility], was performed utilizing techniques to minimize radiation dose exposure, including the use of iterative reconstruction and automated exposure control. Radiation dose length product (DLP) for this visit: 747.01 mGy-cm Enteric Contrast: Not administered. FINDINGS: ABDOMEN Evaluation of the solid organs is limited without IV contrast. LOWER CHEST: Mild dependent hypoventilatory changes. Visualization of the interventricular septum which can be seen in the setting of anemia. LIVER/BILIARY TREE: Heterogeneous attenuation of the liver with decreased attenuation predominantly in the left lobe, probably corresponding to fatty infiltration. Limited evaluation of the parenchyma without IV contrast. Somewhat lobular appearance of the surface contour which may be indicative of cirrhosis. Prominent left lobe. GALLBLADDER: Calcified gallstones, gallbladder mostly contracted. Pericholecystic fluid may be part and parcel of ascites elsewhere. If patient has localized right upper quadrant pain, ultrasound could be considered. SPLEEN: Spleen is borderline  enlarged measuring about 13.3 cm AP dimension. PANCREAS: There is apparent cystic mass in the pancreatic body/tail measuring about 6.5 x 4.3 cm. Another 11 mm cyst is suggested along the medial pancreatic head ADRENAL GLANDS: Unremarkable. KIDNEYS/URETERS: 5 mm nonobstructing calculus left mid kidney. 4 mm calculus left UPJ, without obstruction. STOMACH AND BOWEL: Evaluation of the bowel is limited without oral or IV contrast. Evaluation of the stomach is limited by underdistention. Small bowel is normal caliber. Scattered colonic diverticulosis without evidence of diverticulitis. APPENDIX: No findings to suggest appendicitis. ABDOMINOPELVIC CAVITY: Moderate to large quantity of abdominal and pelvic ascites. No pneumoperitoneum. Limited evaluation of adenopathy without IV contrast. Shotty periportal/peripancreatic lymph nodes are noted up to about 8 mm with 12 mm short axis lymph node along the portal caval region (image 62 series 301). VESSELS: Atherosclerosis without abdominal aortic aneurysm. Limited evaluation of the vessels without contrast. Perisplenic varices suggested. PELVIS REPRODUCTIVE ORGANS: Unremarkable for patient's age. URINARY BLADDER: Unremarkable. ABDOMINAL WALL/INGUINAL REGIONS: Tiny fat-containing umbilical hernia. Small bilateral inguinal hernias containing fat only on the right and fat and a small mount of fluid on the left. BONES: No acute fracture or suspicious osseous lesion. Mild, chronic superior endplate compression fracture of L1 with prominent Schmorl's nodes seen along several lower thoracic vertebrae. Degenerative changes of the spine and bilateral hips.     Impression: 1.  6.5 cm cystic mass in the pancreatic body/tail, indeterminate. Additional 11 mm pancreatic head cyst. Further characterization with contrast-enhanced MRI/MRCP recommended. 2.  Heterogeneous liver with findings suggestive of cirrhosis and fatty infiltration. 3.  Moderate to large quantity of abdominopelvic ascites.  4.  Cholelithiasis. 5.  4 mm left UPJ calculus without obstruction. Additional 5 mm nonobstructing left renal calculus. 6.  Colonic diverticulosis without diverticulitis. 7.  Limited study without IV or oral contrast. The study was marked in EPIC for immediate notification and follow-up. Workstation performed: [id number]     XR chest 1 view portable  Result Date: 4/19/2025  Narrative: XR CHEST PORTABLE INDICATION: 2-week history of weakness, fatigue, and abdominal pain. COMPARISON: None FINDINGS: Clear lungs. No pneumothorax or pleural effusion. Normal cardiomediastinal silhouette. Bones are unremarkable for age. Normal upper abdomen.     Impression: No acute cardiopulmonary disease. Resident: Filipe Valenzuela I, the attending radiologist, have reviewed the images and agree with the final report above. Workstation performed: [id number]     US right upper quadrant  Result Date: 4/11/2025  Narrative: RIGHT UPPER QUADRANT ULTRASOUND INDICATION: R79.89: Other specified abnormal findings of blood chemistry. Elevated LFTs. Concern for cirrhosis. COMPARISON: Abdominal ultrasound 5/4/2023. TECHNIQUE: Real-time ultrasound of the right upper quadrant was performed with a curvilinear transducer with both volumetric sweeps and still imaging techniques. FINDINGS: PANCREAS: Visualized portions of the pancreas are within normal limits. AORTA AND IVC: Visualized portions are normal for patient age. LIVER: Size: Severely enlarged. The liver measures 22.9 cm in the midclavicular line. Contour: Surface contour is nodular. Parenchyma: There is diffuse coarsened heterogeneous echotexture suggesting underlying cirrhotic changes. There is diffusely increased echogenicity with beam attenuation and loss of periportal echogenicity, consistent with severe hepatic steatosis. Liver Observations: No ultrasound evidence of hepatocellular carcinoma (LI-RADS US-1, Negative). Continue with routine 6-month US surveillance. LI-RADS US Visualization Score:  C (Severe limitations). Limited imaging of the main portal vein shows it to be patent and hepatopetal. BILIARY: The gallbladder is normal in caliber. No wall thickening or pericholecystic fluid. Shadowing gallstone(s) identified. No sonographic Sheridan's sign. No intrahepatic biliary dilatation. CBD measures 6.0 mm. No choledocholithiasis. KIDNEY: Right kidney measures 8.8 x 6.0 x 4.0 cm. Volume 110.9 mL Kidney within normal limits. ASCITES: Moderate ascites in the right upper quadrant and right lower quadrant.     Impression: 1. Cirrhosis with severe hepatic steatosis and severe hepatomegaly with sequela of portal hypertension including moderate ascites. 2. LI-RADS US Visualization Score: C (Severe limitations) and no ultrasound evidence of hepatocellular carcinoma (LI-RADS US-1). Consider alternative surveillance modality such as contrast-enhanced MRI as repeat ultrasound is likely to be VIS-C again given the patient's risk factor of MASH-related cirrhosis, and VIS-C scored US has low sensitivity for hepatocellular carcinoma (HCC). 3. Cholelithiasis without additional sonographic findings of acute cholecystitis. The study was marked in EPIC for significant notification. Resident: Pancho Guadalupe I, the attending radiologist, have reviewed the images and agree with the final report above. Workstation performed: [id number]       LABS  Results from last 7 days   Lab Units 04/24/25  0514 04/23/25  0842 04/23/25  0841 04/22/25  1957 04/22/25  1228 04/21/25 2015 04/21/25  0815 04/21/25  0032 04/20/25  1745 04/20/25  0527 04/19/25  1203 04/19/25  1038   WBC Thousand/uL 15.53*  --  16.59*  --  15.24*  --  15.35*  --   --  17.75*  --  22.88*   HEMOGLOBIN g/dL 7.0*  --  7.6* 7.3* 7.7* 7.6* 7.9* 7.1* 7.9* 6.8*  --  7.7*   HEMATOCRIT % 20.9*  --  23.3*  --  23.4*  --  23.4*  --   --  20.3*  --  23.6*   MCV fL 105*  --  105*  --  105*  --  103*  --   --  111*  --  114*   PLATELETS Thousands/uL 65*  --  68*  --  64*  --  63*   --   --  64*  --  88*   INR   --  1.47*  --   --   --   --  1.47*  --   --  1.57* 1.60*  --      Results from last 7 days   Lab Units 04/24/25  0514 04/23/25  0841 04/22/25  1228 04/21/25  0815 04/20/25  0527 04/19/25  1038   SODIUM mmol/L 132* 132* 130* 130* 131* 128*   POTASSIUM mmol/L 3.7 4.1 3.9 3.6 4.0 4.3   CHLORIDE mmol/L 105 105 105 104 105 103   CO2 mmol/L 21 22 21 19* 19* 17*   BUN mg/dL 16 16 15 15 16 16   CREATININE mg/dL 2.21* 2.28* 2.40* 2.30* 2.47* 2.73*   CALCIUM mg/dL 9.8 9.7 9.5 9.3 9.4 9.8   ALBUMIN g/dL 2.5* 2.3* 2.1* 2.0* 1.9* 2.2*   TOTAL BILIRUBIN mg/dL 3.42* 3.78* 3.48* 3.77* 2.96* 3.77*   ALK PHOS U/L 79 88 106* 102 94 111*   ALT U/L 22 29 34 31 32 39   AST U/L 31 39 44* 39 43* 61*   EGFR ml/min/1.73sq m 32 31 29 30 28 25   GLUCOSE RANDOM mg/dL 129 136 147* 137 116 150*     Results from last 7 days   Lab Units 04/19/25  1228 04/19/25  1038   HS TNI 0HR ng/L  --  6   HS TNI 2HR ng/L 5  --                       Results from last 7 days   Lab Units 04/19/25  1038   TSH 3RD GENERATON uIU/mL 3.388               Cultures:   Results from last 7 days   Lab Units 04/19/25  1446   COLOR UA  Yellow   CLARITY UA  Clear   SPEC GRAV UA  1.025   PH UA  5.5   LEUKOCYTES UA  Trace*   NITRITE UA  Negative   GLUCOSE UA mg/dl Negative   KETONES UA mg/dl Negative   BILIRUBIN UA  Negative   BLOOD UA  Trace*      Results from last 7 days   Lab Units 04/19/25  1446   RBC UA /hpf 1-2   WBC UA /hpf 2-4   EPITHELIAL CELLS WET PREP /hpf Moderate*   BACTERIA UA /hpf Innumerable*      Results from last 7 days   Lab Units 04/22/25  1053 04/19/25  2350 04/19/25  1038   BLOOD CULTURE   --  No Growth After 4 Days.  No Growth After 4 Days.  --    GRAM STAIN RESULT  Rare Polys  No bacteria seen  --   --    BODY FLUID CULTURE, STERILE  No growth  --   --    INFLUENZA A PCR   --   --  Negative       Condition at Discharge:  good      Discharge instructions/Information to patient and family:   See after visit summary for  information provided to patient and family.      Provisions for Follow-Up Care:  See after visit summary for information related to follow-up care and any pertinent home health orders.      Disposition:     Home       Discharge Statement:  I spent 40 minutes discharging the patient. This time was spent on the day of discharge. I had direct contact with the patient on the day of discharge. Greater than 50% of the total time was spent examining patient, answering all patient questions, arranging and discussing plan of care with patient as well as directly providing post-discharge instructions.  Additional time then spent on discharge activities.    Discharge Medications:  See after visit summary for reconciled discharge medications provided to patient and family.      ** Please Note: This note has been constructed using a voice recognition system **

## 2025-04-25 ENCOUNTER — TRANSITIONAL CARE MANAGEMENT (OUTPATIENT)
Dept: FAMILY MEDICINE CLINIC | Facility: HOSPITAL | Age: 55
End: 2025-04-25

## 2025-04-25 ENCOUNTER — RESULTS FOLLOW-UP (OUTPATIENT)
Dept: FAMILY MEDICINE CLINIC | Facility: HOSPITAL | Age: 55
End: 2025-04-25

## 2025-04-25 LAB
ABO GROUP BLD BPU: NORMAL
ALBUMIN UR ELPH-MCNC: 12.2 %
ALPHA1 GLOB MFR UR ELPH: 14.6 %
ALPHA2 GLOB MFR UR ELPH: 6.3 %
B-GLOBULIN MFR UR ELPH: 20.6 %
BACTERIA BLD CULT: NORMAL
BACTERIA BLD CULT: NORMAL
BACTERIA SPEC BFLD CULT: NO GROWTH
BPU ID: NORMAL
CROSSMATCH: NORMAL
GAMMA GLOB MFR UR ELPH: 46.3 %
GRAM STN SPEC: NORMAL
GRAM STN SPEC: NORMAL
PROT UR-MCNC: 74.4 MG/DL
UNIT DISPENSE STATUS: NORMAL
UNIT PRODUCT CODE: NORMAL
UNIT PRODUCT VOLUME: 350 ML
UNIT RH: NORMAL

## 2025-04-25 PROCEDURE — 84166 PROTEIN E-PHORESIS/URINE/CSF: CPT | Performed by: STUDENT IN AN ORGANIZED HEALTH CARE EDUCATION/TRAINING PROGRAM

## 2025-04-25 PROCEDURE — 86335 IMMUNFIX E-PHORSIS/URINE/CSF: CPT | Performed by: STUDENT IN AN ORGANIZED HEALTH CARE EDUCATION/TRAINING PROGRAM

## 2025-04-25 NOTE — UTILIZATION REVIEW
NOTIFICATION OF ADMISSION DISCHARGE   This is a Notification of Discharge from [facility]. Please be advised that this patient has been discharge from our facility. Below you will find the admission and discharge date and time including the patient’s disposition.   UTILIZATION REVIEW CONTACT:  Utilization Review Assistants  Network Utilization Review Department  Phone: [phone] x carefully listen to the prompts. All voicemails are confidential.  Email: [email or web address]     ADMISSION INFORMATION  PRESENTATION DATE: 4/19/2025 10:29 AM  OBERVATION ADMISSION DATE: N/A  INPATIENT ADMISSION DATE: 4/19/25  3:54 PM   DISCHARGE DATE: 4/24/2025  4:11 PM   DISPOSITION:Home/Self Care    Network Utilization Review Department  ATTENTION: Please call with any questions or concerns to [phone] and carefully listen to the prompts so that you are directed to the right person. All voicemails are confidential.   For Discharge needs, contact Care Management DC Support Team at [phone] opt. [phone]  Send all requests for admission clinical reviews, approved or denied determinations and any other requests to dedicated fax number below belonging to the campus where the patient is receiving treatment. List of dedicated fax numbers for the Facilities:  FACILITY NAME UR FAX NUMBER   ADMISSION DENIALS (Administrative/Medical Necessity) [phone]   DISCHARGE SUPPORT TEAM ([facility]) [phone]   PARENT CHILD HEALTH (Maternity/NICU/Pediatrics) [phone]   General acute hospital [phone]   [facility] [phone]   [facility] [phone]   [facility] [phone]   [facility] [phone]   [facility] [phone]   [facility] [phone]   [facility] [phone]   [facility] [phone]   [facility] [phone]   [facility] [phone]   [facility] [phone]

## 2025-04-27 LAB — PTH RELATED PROT SERPL-SCNC: <2 PMOL/L

## 2025-04-28 LAB
COPPER 24H UR-MRATE: 26 UG/24 HR (ref 3–35)
COPPER UR-MCNC: 73 UG/L
COPPER/CREAT UR: 68 UG/G CREAT (ref 0–49)
CREAT UR-MCNC: 1.07 G/L (ref 0.3–3)

## 2025-05-09 NOTE — TELEPHONE ENCOUNTER
----- Message from Cruzito Choudhury DO sent at 5/9/2025  8:01 AM EDT -----  Thanks TK! Yeah, I would like to get him in sooner if able, preferably in the next few weeks. He can still keep his appointment with Hyacinth though.  ----- Message -----  From: Meron Sue  Sent: 5/7/2025   2:28 PM EDT  To: DO Hayden Barnett Dr-pt ayad has ov scheduled with Avinash on 7/28-schedule ov with you also?  ----- Message -----  From: Cruzito Choudhury DO  Sent: 5/7/2025  12:39 PM EDT  To: Edel Dill PA-C; #    Hi Wellington Hollingsworth, having to get through messages after call weekend. This looks negative for Roque's disease, cutoff is generally between 40 and 100.    Clerical, can he please be offered follow up with me in the next 1-2 weeks if I have any availability?  ----- Message -----  From: Edel Dill PA-C  Sent: 5/4/2025   2:20 PM EDT  To: Cruzito Choudhury, DO Hayden Landa,  This pt saw you as outpt. I never ordered this test in the past but did when pt was inpt, looks normal to me but just want a second set of eyes to make sure 24h urine copper was normal. Let me know, thanks!  ----- Message -----  From: Lab, Background User  Sent: 4/28/2025   5:05 PM EDT  To: Edel Dill PA-C

## 2025-05-09 NOTE — TELEPHONE ENCOUNTER
[facility] for patient to call and schedule a follow up with Dr. Choudhury. Availability in [place] office 6/11/2025.

## 2025-05-12 ENCOUNTER — TELEPHONE (OUTPATIENT)
Dept: GASTROENTEROLOGY | Facility: CLINIC | Age: 55
End: 2025-05-12

## 2025-05-12 NOTE — TELEPHONE ENCOUNTER
Lvm for pt to call and schedule sooner office visit with Dr Choudhury sooner than the office visit currently scheduled with Sandra Ku

## 2025-05-12 NOTE — TELEPHONE ENCOUNTER
Per Dr Choudhury-I would like to get him in sooner if able, preferably in the next few weeks. He can still keep his appointment with Hyacinth though.

## 2025-05-19 DIAGNOSIS — E78.2 MIXED HYPERLIPIDEMIA: ICD-10-CM

## 2025-05-19 DIAGNOSIS — Z86.73 HISTORY OF CVA (CEREBROVASCULAR ACCIDENT): ICD-10-CM

## 2025-05-19 RX ORDER — ROSUVASTATIN CALCIUM 10 MG/1
10 TABLET, COATED ORAL DAILY
Qty: 90 TABLET | Refills: 0 | Status: SHIPPED | OUTPATIENT
Start: 2025-05-19 | End: 2025-05-22 | Stop reason: SDUPTHER

## 2025-05-22 DIAGNOSIS — E78.2 MIXED HYPERLIPIDEMIA: ICD-10-CM

## 2025-05-22 DIAGNOSIS — Z86.73 HISTORY OF CVA (CEREBROVASCULAR ACCIDENT): ICD-10-CM

## 2025-05-23 DIAGNOSIS — E78.2 MIXED HYPERLIPIDEMIA: ICD-10-CM

## 2025-05-23 DIAGNOSIS — Z86.73 HISTORY OF CVA (CEREBROVASCULAR ACCIDENT): ICD-10-CM

## 2025-05-23 RX ORDER — ROSUVASTATIN CALCIUM 10 MG/1
10 TABLET, COATED ORAL DAILY
Qty: 90 TABLET | Refills: 0 | Status: SHIPPED | OUTPATIENT
Start: 2025-05-23 | End: 2025-05-23 | Stop reason: SDUPTHER

## 2025-05-23 RX ORDER — ROSUVASTATIN CALCIUM 10 MG/1
10 TABLET, COATED ORAL DAILY
Qty: 90 TABLET | Refills: 0 | Status: SHIPPED | OUTPATIENT
Start: 2025-05-23

## 2025-05-27 DIAGNOSIS — E78.2 MIXED HYPERLIPIDEMIA: ICD-10-CM

## 2025-05-27 DIAGNOSIS — Z86.73 HISTORY OF CVA (CEREBROVASCULAR ACCIDENT): ICD-10-CM

## 2025-05-27 DIAGNOSIS — K21.9 GASTROESOPHAGEAL REFLUX DISEASE, UNSPECIFIED WHETHER ESOPHAGITIS PRESENT: ICD-10-CM

## 2025-05-27 RX ORDER — PANTOPRAZOLE SODIUM 40 MG/1
40 TABLET, DELAYED RELEASE ORAL DAILY
Qty: 90 TABLET | Refills: 1 | Status: SHIPPED | OUTPATIENT
Start: 2025-05-27

## 2025-05-28 RX ORDER — ROSUVASTATIN CALCIUM 10 MG/1
10 TABLET, COATED ORAL DAILY
Qty: 90 TABLET | Refills: 0 | Status: SHIPPED | OUTPATIENT
Start: 2025-05-28

## 2025-06-11 DIAGNOSIS — K86.1 IDIOPATHIC CHRONIC PANCREATITIS (HCC): ICD-10-CM

## 2025-06-11 DIAGNOSIS — K74.69 OTHER CIRRHOSIS OF LIVER (HCC): Primary | ICD-10-CM

## 2025-06-21 DIAGNOSIS — K21.9 GASTROESOPHAGEAL REFLUX DISEASE, UNSPECIFIED WHETHER ESOPHAGITIS PRESENT: ICD-10-CM

## 2025-06-21 DIAGNOSIS — R53.1 GENERALIZED WEAKNESS: ICD-10-CM

## 2025-06-23 RX ORDER — PANTOPRAZOLE SODIUM 40 MG/1
40 TABLET, DELAYED RELEASE ORAL DAILY
Qty: 90 TABLET | Refills: 0 | OUTPATIENT
Start: 2025-06-23

## 2025-06-26 RX ORDER — MIDODRINE HYDROCHLORIDE 2.5 MG/1
2.5 TABLET ORAL DAILY
Qty: 30 TABLET | Refills: 0 | OUTPATIENT
Start: 2025-06-26

## 2025-07-03 ENCOUNTER — TELEPHONE (OUTPATIENT)
Dept: GASTROENTEROLOGY | Facility: CLINIC | Age: 55
End: 2025-07-03

## 2025-07-08 ENCOUNTER — TELEMEDICINE (OUTPATIENT)
Dept: GASTROENTEROLOGY | Facility: CLINIC | Age: 55
End: 2025-07-08
Payer: COMMERCIAL

## 2025-07-08 VITALS — HEIGHT: 68 IN | WEIGHT: 143 LBS | BODY MASS INDEX: 21.67 KG/M2

## 2025-07-08 DIAGNOSIS — K86.2 PANCREATIC CYST: ICD-10-CM

## 2025-07-08 DIAGNOSIS — R18.8 CIRRHOSIS OF LIVER WITH ASCITES, UNSPECIFIED HEPATIC CIRRHOSIS TYPE  (HCC): Primary | ICD-10-CM

## 2025-07-08 DIAGNOSIS — K74.60 CIRRHOSIS OF LIVER WITH ASCITES, UNSPECIFIED HEPATIC CIRRHOSIS TYPE  (HCC): Primary | ICD-10-CM

## 2025-07-08 DIAGNOSIS — K22.2 SCHATZKI'S RING: ICD-10-CM

## 2025-07-08 PROCEDURE — 98006 SYNCH AUDIO-VIDEO EST MOD 30: CPT | Performed by: PHYSICIAN ASSISTANT

## 2025-07-08 NOTE — ASSESSMENT & PLAN NOTE
It appears that the hepatology team at [facility] is also ordering MRCP to be done at the same time as his MRI in October.

## 2025-07-08 NOTE — PROGRESS NOTES
Virtual Regular Visit  Name: Sudhir Bach      : 1970      MRN: [id number]  Encounter Provider: Sandra Ku PA-C  Encounter Date: 2025   Encounter department: [facility] [place]  :  Assessment & Plan  Cirrhosis of liver with ascites, unspecified hepatic cirrhosis type  (HCC)  Patient was suspected MASLD cirrhosis with concomitant iron overload syndrome.  Unfortunately it appears his cirrhosis is decompensated by ascites, SBP and HE.  It appears that the patient was visiting his parents in [place] this past May and at that time was hospitalized with altered mental status suspected to be due to hepatic encephalopathy.  Subsequently after that he did start seeing the hepatology team at [facility].  Today, he explains that he is feeling well.  He does admit that he is interested in a second opinion hepatologist as the hepatology team at [facility] did recommend a transplant and the patient would like to discuss this further with a different hepatology team in the meantime.  He notes that he understands it might take some time to get in with our hepatology team so he is planning on continuing care with [facility] hepatology in the meantime.MELD 3.0: 27 at 2025  5:14 AM  MELD-Na: 26 at 2025  5:14 AM  Calculated from:  Serum Creatinine: 2.21 mg/dL at 2025  5:14 AM  Serum Sodium: 132 mmol/L at 2025  5:14 AM  Total Bilirubin: 3.42 mg/dL at 2025  5:14 AM  Serum Albumin: 2.5 g/dL at 2025  5:14 AM  INR(ratio): 1.47 at 2025  8:42 AM  Age at listing (hypothetical): 55 years  Sex: Male at 2025  5:14 AM    -ascites: Patient was hyperkalemic on Aldactone so this was stopped and was asked to be on Lasix 20 mg daily which she says he is taking on a daily basis.  He also notes that he did need to get a paracentesis while he was admitted and was found to have SBP and placed on Cipro.  He says he has not been needing any further paracentesis since.    - Continue  "Lasix 20 mg daily    - Continue Cipro 500 mg daily    - Continue paracentesis as needed    - Continue midodrine 2.5 mg daily     -varices: No varices on EGD this past December.    - Would recommend repeat EGD within the next 6 to 12 months due to new decompensated cirrhosis     -HCC screening: AFP in May within normal limits patient says he is still undergoing care with [facility] for now and will be getting MRI with hepatic iron quantification done this coming October.     HE: No signs of this at this time however unfortunately we were not able to properly assess for asterixis as this is a virtual visit.  The patient explains that his hepatology team told him he does not need to be on lactulose or rifaximin since his encephalopathy was likely due to an infection.     -transplant candidacy: TBD with our hepatology team, as well    I will place referral to our hepatology team but I did encourage the patient to continue care with [facility] hepatology in the meantime as they started him on the Lasix, Cipro and have ordered MRI for him.  The patient verbalized understanding and said he was already planning on keeping his care with them until he sees our hepatology team.  Orders:    Ambulatory Referral to Hepatology; Future    Schatzki's ring  Doing well off of PPI and would like to stay off of this at this time.       Pancreatic cyst  It appears that the hepatology team at [facility] is also ordering MRCP to be done at the same time as his MRI in October.           History of Present Illness     HPI  Review of Systems   Constitutional:  Negative for appetite change, chills, diaphoresis, fatigue, fever and unexpected weight change.   HENT:  Negative for trouble swallowing.    Gastrointestinal:  Negative for abdominal distention, abdominal pain, anal bleeding, blood in stool, constipation, diarrhea, nausea, rectal pain and vomiting.       Objective   Ht 5' 8\" (1.727 m)   Wt 64.9 kg (143 lb)   BMI 21.74 kg/m²     Physical " Exam  Constitutional:       General: He is not in acute distress.     Appearance: Normal appearance. He is not ill-appearing, toxic-appearing or diaphoretic.     Neurological:      Mental Status: He is alert.         Administrative Statements   Encounter provider Sandra Ku PA-C    The Patient is located at Home and in the following state in which I hold an active license [place].    The patient was identified by name and date of birth. Sudhir Bach was informed that this is a telemedicine visit and that the visit is being conducted through the Epic Embedded platform. He agrees to proceed..  My office door was closed. No one else was in the room.  He acknowledged consent and understanding of privacy and security of the video platform. The patient has agreed to participate and understands they can discontinue the visit at any time.    I have spent a total time of 30 minutes in caring for this patient on the day of the visit/encounter including Diagnostic results, Prognosis, Risks and benefits of tx options, Instructions for management, Patient and family education, Importance of tx compliance, Risk factor reductions, Impressions, Counseling / Coordination of care, Documenting in the medical record, Reviewing/placing orders in the medical record (including tests, medications, and/or procedures), Obtaining or reviewing history  , and Communicating with other healthcare professionals , not including the time spent for establishing the audio/video connection.

## 2025-07-11 ENCOUNTER — TELEPHONE (OUTPATIENT)
Age: 55
End: 2025-07-11

## 2025-07-11 ENCOUNTER — OFFICE VISIT (OUTPATIENT)
Dept: GASTROENTEROLOGY | Facility: CLINIC | Age: 55
End: 2025-07-11
Payer: COMMERCIAL

## 2025-07-11 VITALS
HEIGHT: 68 IN | DIASTOLIC BLOOD PRESSURE: 66 MMHG | WEIGHT: 141 LBS | SYSTOLIC BLOOD PRESSURE: 124 MMHG | BODY MASS INDEX: 21.37 KG/M2

## 2025-07-11 DIAGNOSIS — K86.1 IDIOPATHIC CHRONIC PANCREATITIS (HCC): ICD-10-CM

## 2025-07-11 DIAGNOSIS — R18.8 CIRRHOSIS OF LIVER WITH ASCITES, UNSPECIFIED HEPATIC CIRRHOSIS TYPE  (HCC): Primary | ICD-10-CM

## 2025-07-11 DIAGNOSIS — K74.60 CIRRHOSIS OF LIVER WITH ASCITES, UNSPECIFIED HEPATIC CIRRHOSIS TYPE  (HCC): Primary | ICD-10-CM

## 2025-07-11 DIAGNOSIS — N18.4 CKD (CHRONIC KIDNEY DISEASE) STAGE 4, GFR 15-29 ML/MIN (HCC): Chronic | ICD-10-CM

## 2025-07-11 DIAGNOSIS — K74.60 DECOMPENSATED HEPATIC CIRRHOSIS (HCC): ICD-10-CM

## 2025-07-11 DIAGNOSIS — K72.90 DECOMPENSATED HEPATIC CIRRHOSIS (HCC): ICD-10-CM

## 2025-07-11 DIAGNOSIS — K86.2 PANCREATIC CYST: ICD-10-CM

## 2025-07-11 PROCEDURE — 99214 OFFICE O/P EST MOD 30 MIN: CPT | Performed by: STUDENT IN AN ORGANIZED HEALTH CARE EDUCATION/TRAINING PROGRAM

## 2025-07-11 NOTE — TELEPHONE ENCOUNTER
Called and spoke to patient about scheduling a new patient appt with Hepatology. Patient scheduled on 9/4 with Dr. Mota in fellows clinic

## 2025-07-11 NOTE — TELEPHONE ENCOUNTER
----- Message from [place] BARAJAS sent at 7/9/2025  2:56 PM EDT -----    ----- Message -----  From: Sandra Ku PA-C  Sent: 7/8/2025   1:24 PM EDT  To: [facility] [place]  [facility] Clerical;#    Hello! this patient is a newly diagnosed cirrhotic.  Please schedule an office visit with Dr. Mota.  Thank so much

## 2025-07-13 NOTE — ASSESSMENT & PLAN NOTE
Pancreatic cysts on imaging including 5.7 cm tail cyst and 2.2 cm head cyst with ductal dilation in region of the tail cyst, though limited views per report.    Per [facility] notes, plan for repeat MRI with hepatic iron quantification and MRCP in October. Depending on findings, referral for EUS can be considered.    As above, he has follow up with our hepatology team in the interim and will hold off on entering this order in case another study is preferred by them.

## 2025-07-13 NOTE — ASSESSMENT & PLAN NOTE
Previous episodes per chart, ? ETOH vs gallstone, ultimately idopathic. Reports his ETOH use is 'social'. Cysts could be pseudocysts, though they were not seen in his 2023 study. Suspect he will need EUS on basis of repeat MRI/MRCP.

## 2025-07-13 NOTE — PROGRESS NOTES
Name: Sudhir Bach      : 1970      MRN: [id number]  Encounter Provider: Cruzito Choudhury DO  Encounter Date: 2025   Encounter department: [facility]  :  Assessment & Plan  Cirrhosis of liver with ascites, unspecified hepatic cirrhosis type  (HCC)    55 year old male with pmhx decompensated cirrhosis including ascites (low protein), MELD 3.0 score 27 ( labs)    Etiology: MASH vs non-HFE iron overload; recheck MELD labs now; per [facility] recommending MRI with iron quant in October. Biopsy could also be considered (also in setting of low ceruloplasmin), will defer to hepatology colleagues.  Ascites: low protein, on Lasix 20mg due to hyperkalemia hx; no significant ascites on exam today; previously discontinued cipro due to concern of side effects; Bactrim considered but also has hyper K risk; could consider rifaxmin, but if not covered needs to consider going back on cipro  EV: none present on 2024 EGD; BB not initiated due to hx soft BP requiring midodrine; repeat in 2025 previously recommended  HE: non on exam today; no need for lactulose/rifaximin at this time  HCC: non present on MRI 2025 and AFP 6 at recent [facility] visit; will need repeat MRI with iron quantification and MRCP around October; will defer ordering as he plans to see Dr. Mota in the interim and can see if she agrees or prefers another study  Transplant eval: [facility] hepatology had recommended referral for transplant. He believes he will now remain in this area and will discuss with our hepatology team as scheduled regarding eval by [facility]        Orders:    Comprehensive metabolic panel    Protime-INR    CBC and differential    AFP tumor marker    Cancer antigen 19-9    Decompensated hepatic cirrhosis (HCC)  See above       Pancreatic cyst  Pancreatic cysts on imaging including 5.7 cm tail cyst and 2.2 cm head cyst with ductal dilation in region of the tail cyst, though limited views per  report.    Per [facility] notes, plan for repeat MRI with hepatic iron quantification and MRCP in October. Depending on findings, referral for EUS can be considered.    As above, he has follow up with our hepatology team in the interim and will hold off on entering this order in case another study is preferred by them.       Idiopathic chronic pancreatitis (HCC)  Previous episodes per chart, ? ETOH vs gallstone, ultimately idopathic. Reports his ETOH use is 'social'. Cysts could be pseudocysts, though they were not seen in his 2023 study. Suspect he will need EUS on basis of repeat MRI/MRCP.       CKD (chronic kidney disease) stage 4, GFR 15-29 ml/min (HCC)  Lab Results   Component Value Date    EGFR 32 04/24/2025    EGFR 31 04/23/2025    EGFR 29 04/22/2025    CREATININE 2.21 (H) 04/24/2025    CREATININE 2.28 (H) 04/23/2025    CREATININE 2.40 (H) 04/22/2025   Has seen [facility] nephrology in the past for CKD and resultant anemia. EGD and colonoscopy in 12/2024 did not have any bleeding lesions. Recommend ongoing nephrology follow up.           Assessment & Plan      History of Present Illness     History of Present Illness  Sudhir Bach is a 55 y.o. 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 Etiology, ? ETOH vs idiopathic), pancreatic pseudocyts, chronic anemia in setting of CKD who presents for routine follow up.      Initially met in April 2025 with complaint of new onset abdominal distension and weight gain in setting of abnormal LFTs. Follow up labs were notable for hyperbilirubinemia, hyponatremia and BUD on CKD for which he was referred to the ER for admission.    Workup included CT with finding of a nodular liver and 6.4 cm pancreatic cyst. Follow up MRI on 04/20/2025 confirmed a 5.7 x 5.2 cm pancreatic cyst in the distal body/tail communicating with the dorsal pancreatic duct suggestive of pseudocyst, though IPMN also a  "consideration. An additional 2.2 cm cyst was in the head of the pancreas. Pancreatic duct was dilated in relation to the larger cyst.       Paracentesis performed that admission was negative for SBP, low protein noted. SAAG > 1.1.    Hepatitis B and C serologies, as well as AMA/ASMA negative. Iron 43 and ferritin 1365 though genetic testing was not suggestive of hereditary hemochromatosis.    Ceruloplasmin was 9.2, though spot urine of 73 and 24 hr urine copper of 26.    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    Since discharge here , he seems to have had a follow up hospitalization for HE at [facility]. Subsequently, he followed with hepatology there on May 30, 2025. Suspected to have MASH cirrhosis with consideration for non-HFE iron overload. On Lasix 20 mg, holding spironolactone due to hx hyperkalemia. Doing well on this with no recurrent ascites. Has been on cipro 500 mg for SBP prophylaxis, but patient self-discontinued due to concern of side effects.    Last EGD 12/2024, no EV, plan to repeat in December due to varices. Beta-blockade was held due to previous need for midodrine.    Plan was also to check MRI with iron quantification in October with MRCP protocol due to pancreatic cysts and refer for liver eval. Referral for EUS was likely going to be made after follow up MRI.    He continues to do well and has follow up scheduled with Dr. Mota of [facility] hepatology and is interested in establishing care with their team so that if transplant were necessary, it could potentially be managed through the [facility] system.    History obtained from: patient  Review of Systems A complete review of systems is negative other than that noted above in the HPI.    Medications Ordered Prior to Encounter[1]   Social History[2]  Current Medications[3]  Objective   /66   Ht 5' 8\" (1.727 m)   Wt 64 kg (141 lb)   BMI 21.44 kg/m²       Physical Exam  General Appearance: NAD, cooperative, alert  Eyes: Anicteric  GI:  Soft, non-tender, " non-distended; normal bowel sounds; no masses, no organomegaly   Rectal: Deferred  Musculoskeletal: No edema.  Skin:     No jaundice      Results    Lab Results: I personally reviewed relevant lab results.       Results for orders placed during the hospital encounter of 12/12/24    EGD    Impression  Normal duodenum.  Biopsies taken for celiac disease  Mild diffuse gastritis.  Biopsies taken to evaluate for H. Pylori  Mild Schatzki's ring but otherwise normal esophagus  56F Morris dilator passed without difficulty      RECOMMENDATION:  Resume regular diet and medications  Call biopsy results in 1 to 2 weeks  Follow-up in the office            Sudhir Leslie MD      Administrative Statements   I have spent a total time of 40 minutes in caring for this patient on the day of the visit/encounter including Diagnostic results, Prognosis, Risks and benefits of tx options, Instructions for management, Patient and family education, Importance of tx compliance, Risk factor reductions, Impressions, Documenting in the medical record, Reviewing/placing orders in the medical record (including tests, medications, and/or procedures), and Obtaining or reviewing history  .       [1]   Current Outpatient Medications on File Prior to Visit   Medication Sig Dispense Refill    escitalopram (LEXAPRO) 10 mg tablet Take 1 tablet (10 mg total) by mouth daily 90 tablet 1    midodrine (PROAMATINE) 2.5 mg tablet Take 1 tablet (2.5 mg total) by mouth in the morning 30 tablet 0    pantoprazole (PROTONIX) 40 mg tablet Take 1 tablet (40 mg total) by mouth daily 30 minutes before breakfast 90 tablet 1    rosuvastatin (CRESTOR) 10 MG tablet Take 1 tablet (10 mg total) by mouth daily 90 tablet 0    Cholecalciferol (Vitamin D3) 1.25 MG (72044 UT) CAPS Take 1 capsule (50,000 Units total) by mouth once a week 8 capsule 0     No current facility-administered medications on file prior to visit.   [2]   Social History  Tobacco Use    Smoking status:  Never    Smokeless tobacco: Never   Vaping Use    Vaping status: Never Used   Substance and Sexual Activity    Alcohol use: Not Currently    Drug use: Never    Sexual activity: Not Currently     Partners: Female     Birth control/protection: None   [3]   Current Outpatient Medications   Medication Sig Dispense Refill    escitalopram (LEXAPRO) 10 mg tablet Take 1 tablet (10 mg total) by mouth daily 90 tablet 1    midodrine (PROAMATINE) 2.5 mg tablet Take 1 tablet (2.5 mg total) by mouth in the morning 30 tablet 0    pantoprazole (PROTONIX) 40 mg tablet Take 1 tablet (40 mg total) by mouth daily 30 minutes before breakfast 90 tablet 1    rosuvastatin (CRESTOR) 10 MG tablet Take 1 tablet (10 mg total) by mouth daily 90 tablet 0    Cholecalciferol (Vitamin D3) 1.25 MG (04362 UT) CAPS Take 1 capsule (50,000 Units total) by mouth once a week 8 capsule 0     No current facility-administered medications for this visit.

## 2025-07-13 NOTE — ASSESSMENT & PLAN NOTE
Lab Results   Component Value Date    EGFR 32 04/24/2025    EGFR 31 04/23/2025    EGFR 29 04/22/2025    CREATININE 2.21 (H) 04/24/2025    CREATININE 2.28 (H) 04/23/2025    CREATININE 2.40 (H) 04/22/2025   [facility] the [facility] for CKD and resultant anemia. EGD and colonoscopy in 12/2024 did not have any bleeding lesions. Recommend ongoing nephrology follow up.

## 2025-08-18 ENCOUNTER — TELEPHONE (OUTPATIENT)
Age: 55
End: 2025-08-18

## 2025-08-18 DIAGNOSIS — R74.02 NONSPECIFIC ELEVATION OF LEVELS OF TRANSAMINASE OR LACTIC ACID DEHYDROGENASE (LDH): Primary | ICD-10-CM

## 2025-08-18 DIAGNOSIS — R74.01 NONSPECIFIC ELEVATION OF LEVELS OF TRANSAMINASE OR LACTIC ACID DEHYDROGENASE (LDH): Primary | ICD-10-CM

## 2025-08-18 DIAGNOSIS — N18.32 STAGE 3B CHRONIC KIDNEY DISEASE (HCC): ICD-10-CM

## 2025-08-18 LAB
AFP-TM SERPL-MCNC: 7.2 NG/ML
ALBUMIN SERPL-MCNC: 3.6 G/DL (ref 3.6–5.1)
ALBUMIN/GLOB SERPL: 0.9 (CALC) (ref 1–2.5)
ALP SERPL-CCNC: 124 U/L (ref 35–144)
ALT SERPL-CCNC: 26 U/L (ref 9–46)
AST SERPL-CCNC: 56 U/L (ref 10–35)
BASOPHILS # BLD AUTO: 49 CELLS/UL (ref 0–200)
BASOPHILS NFR BLD AUTO: 1 %
BILIRUB SERPL-MCNC: 1.5 MG/DL (ref 0.2–1.2)
BUN SERPL-MCNC: 18 MG/DL (ref 7–25)
BUN/CREAT SERPL: 14 (CALC) (ref 6–22)
CALCIUM SERPL-MCNC: 9.7 MG/DL (ref 8.6–10.3)
CANCER AG19-9 SERPL-ACNC: 30 U/ML
CHLORIDE SERPL-SCNC: 106 MMOL/L (ref 98–110)
CO2 SERPL-SCNC: 25 MMOL/L (ref 20–32)
CREAT SERPL-MCNC: 1.31 MG/DL (ref 0.7–1.3)
EOSINOPHIL # BLD AUTO: 103 CELLS/UL (ref 15–500)
EOSINOPHIL NFR BLD AUTO: 2.1 %
ERYTHROCYTE [DISTWIDTH] IN BLOOD BY AUTOMATED COUNT: 13.1 % (ref 11–15)
GFR/BSA.PRED SERPLBLD CYS-BASED-ARV: 64 ML/MIN/1.73M2
GLOBULIN SER CALC-MCNC: 4.2 G/DL (CALC) (ref 1.9–3.7)
GLUCOSE SERPL-MCNC: 120 MG/DL (ref 65–99)
HCT VFR BLD AUTO: 30.6 % (ref 38.5–50)
HGB BLD-MCNC: 10.2 G/DL (ref 13.2–17.1)
INR PPP: 1.2
LYMPHOCYTES # BLD AUTO: 1759 CELLS/UL (ref 850–3900)
LYMPHOCYTES NFR BLD AUTO: 35.9 %
MCH RBC QN AUTO: 36.2 PG (ref 27–33)
MCHC RBC AUTO-ENTMCNC: 33.3 G/DL (ref 32–36)
MCV RBC AUTO: 108.5 FL (ref 80–100)
MONOCYTES # BLD AUTO: 417 CELLS/UL (ref 200–950)
MONOCYTES NFR BLD AUTO: 8.5 %
NEUTROPHILS # BLD AUTO: 2573 CELLS/UL (ref 1500–7800)
NEUTROPHILS NFR BLD AUTO: 52.5 %
PLATELET # BLD AUTO: 78 THOUSAND/UL (ref 140–400)
PMV BLD REES-ECKER: 10.3 FL (ref 7.5–12.5)
POTASSIUM SERPL-SCNC: 3.2 MMOL/L (ref 3.5–5.3)
PROT SERPL-MCNC: 7.8 G/DL (ref 6.1–8.1)
PROTHROMBIN TIME: 12.3 SEC (ref 9–11.5)
RBC # BLD AUTO: 2.82 MILLION/UL (ref 4.2–5.8)
SODIUM SERPL-SCNC: 141 MMOL/L (ref 135–146)
WBC # BLD AUTO: 4.9 THOUSAND/UL (ref 3.8–10.8)